# Patient Record
Sex: FEMALE | Race: WHITE | NOT HISPANIC OR LATINO | Employment: FULL TIME | URBAN - METROPOLITAN AREA
[De-identification: names, ages, dates, MRNs, and addresses within clinical notes are randomized per-mention and may not be internally consistent; named-entity substitution may affect disease eponyms.]

---

## 2018-01-15 ENCOUNTER — TRANSFERRED RECORDS (OUTPATIENT)
Dept: HEALTH INFORMATION MANAGEMENT | Facility: CLINIC | Age: 28
End: 2018-01-15

## 2018-01-15 LAB
C TRACH DNA SPEC QL PROBE+SIG AMP: NEGATIVE
SPECIMEN DESCRIPTION: NORMAL

## 2018-01-17 ENCOUNTER — PRENATAL OFFICE VISIT (OUTPATIENT)
Dept: OBGYN | Facility: CLINIC | Age: 28
End: 2018-01-17

## 2018-01-17 ENCOUNTER — APPOINTMENT (OUTPATIENT)
Dept: OBGYN | Facility: CLINIC | Age: 28
End: 2018-01-17
Payer: COMMERCIAL

## 2018-01-17 DIAGNOSIS — Z34.00 PRENATAL CARE, FIRST PREGNANCY: Primary | ICD-10-CM

## 2018-01-17 PROCEDURE — 99207 ZZC NO CHARGE NURSE ONLY: CPT | Performed by: OBSTETRICS & GYNECOLOGY

## 2018-01-17 RX ORDER — PRENATAL VIT/IRON FUM/FOLIC AC 27MG-0.8MG
1 TABLET ORAL DAILY
COMMUNITY
Start: 2017-07-14 | End: 2018-05-15

## 2018-01-17 NOTE — PROGRESS NOTES
Patient is transfer from [facility]. I asked her to please get her records faxed or sent to OB/GYN clinic before appointment  Indinaa Velazquez OB Intake Nurse

## 2018-01-17 NOTE — MR AVS SNAPSHOT
"              After Visit Summary   1/17/2018    Anu Davis    MRN: [id number]           Patient Information     Date Of Birth          1990        Visit Information        Provider Department      1/17/2018 9:21 AM Asia Borden MD [facility]        Today's Diagnoses     Prenatal care, first pregnancy    -  1       Follow-ups after your visit        Your next 10 appointments already scheduled     Jan 29, 2018  2:00 PM CST   New Prenatal with Asia Bordne MD   [facility]    [address] [address]   [phone]              Who to contact     If you have questions or need follow up information about today's clinic visit or your schedule please contact [facility] directly at [phone].  Normal or non-critical lab and imaging results will be communicated to you by MyChart, letter or phone within 4 business days after the clinic has received the results. If you do not hear from us within 7 days, please contact the clinic through MyChart or phone. If you have a critical or abnormal lab result, we will notify you by phone as soon as possible.  Submit refill requests through GPMESS or call your pharmacy and they will forward the refill request to us. Please allow 3 business days for your refill to be completed.          Additional Information About Your Visit        MyChart Information     GPMESS lets you send messages to your doctor, view your test results, renew your prescriptions, schedule appointments and more. To sign up, go to [email or web address] . Click on \"Log in\" on the left side of the screen, which will take you to the Welcome page. Then click on \"Sign up Now\" on the right side of the page.     You will be asked to enter the access code listed below, as well as some personal information. Please follow the directions to create your username and password.     Your access code " is: [email or web address]  Expires: 2018  9:34 AM     Your access code will  in 90 days. If you need help or a new code, please call your [place] clinic or [phone].        Care EveryWhere ID     This is your Care EveryWhere ID. This could be used by other organizations to access your [place] medical records  [id number]        Your Vitals Were     Last Period                   2017            Blood Pressure from Last 3 Encounters:   No data found for BP    Weight from Last 3 Encounters:   No data found for Wt              Today, you had the following     No orders found for display       Primary Care Provider Fax #    Physician No Ref-Primary [phone]       No address on file        Equal Access to Services     [facility] M[facility] : Ronald Hurd, uriah vega, zachery barriosmajorge plasencia, lucinda sharma . So [facility] [phone].    ATENCIÓN: Si habla español, tiene a mosquera disposición servicios gratuitos de asistencia lingüística. Llame al [phone].    We comply with applicable federal civil rights laws and [place] laws. We do not discriminate on the basis of race, color, national origin, age, disability, sex, sexual orientation, or gender identity.            Thank you!     Thank you for choosing [facility]  for your care. Our goal is always to provide you with excellent care. Hearing back from our patients is one way we can continue to improve our services. Please take a few minutes to complete the written survey that you may receive in the mail after your visit with us. Thank you!             Your Updated Medication List - Protect others around you: Learn how to safely use, store and throw away your medicines at www.disposemymeds.org.          This list is accurate as of: 18  9:34 AM.  Always use your most recent med list.                   Brand Name Dispense Instructions for use Diagnosis    prenatal multivitamin plus iron 27-0.8 MG  Tabs per tablet      Take 1 tablet by mouth daily

## 2018-01-24 ENCOUNTER — TRANSFERRED RECORDS (OUTPATIENT)
Dept: HEALTH INFORMATION MANAGEMENT | Facility: CLINIC | Age: 28
End: 2018-01-24

## 2018-01-29 ENCOUNTER — TELEPHONE (OUTPATIENT)
Dept: OBGYN | Facility: CLINIC | Age: 28
End: 2018-01-29

## 2018-01-29 ENCOUNTER — PRENATAL OFFICE VISIT (OUTPATIENT)
Dept: OBGYN | Facility: CLINIC | Age: 28
End: 2018-01-29
Payer: COMMERCIAL

## 2018-01-29 VITALS
HEART RATE: 107 BPM | DIASTOLIC BLOOD PRESSURE: 65 MMHG | TEMPERATURE: 98.3 F | SYSTOLIC BLOOD PRESSURE: 131 MMHG | WEIGHT: 154 LBS | BODY MASS INDEX: 32.32 KG/M2 | HEIGHT: 58 IN

## 2018-01-29 DIAGNOSIS — Z34.00 ENCOUNTER FOR SUPERVISION PREGNANCY IN PRIMIGRAVIDA, ANTEPARTUM: Primary | ICD-10-CM

## 2018-01-29 DIAGNOSIS — Z34.03 ENCOUNTER FOR PRENATAL CARE IN THIRD TRIMESTER OF FIRST PREGNANCY: Primary | ICD-10-CM

## 2018-01-29 PROCEDURE — 99207 ZZC FIRST OB VISIT: CPT | Performed by: OBSTETRICS & GYNECOLOGY

## 2018-01-29 NOTE — PROGRESS NOTES
"Anu is a 27 year old   female who presents for transfer of prenatal care from [facility]; she is now living in [place]; she had 1st trimester ultrasound which was normal and she does not know baby's gender; she had some spotting last week, no pain, no LOF; sonogram done at that time was normal and her cervix appeared \"friable\" per exam note; labs reviewed and were all normal including glucose screen  Received TDAP.    Patient Active Problem List    Diagnosis Date Noted     Prenatal care, first pregnancy 2018     Priority: Medium       All systems were reviewed and pertinent information in noted in subjective/HPI.    Past Medical History:   Diagnosis Date     Chickenpox      Tenosynovitis        Past Surgical History:   Procedure Laterality Date     WRIST SURGERY Right 2011    ganglion removal         Current Outpatient Prescriptions:      Prenatal Vit-Fe Fumarate-FA (PRENATAL MULTIVITAMIN PLUS IRON) 27-0.8 MG TABS per tablet, Take 1 tablet by mouth daily, Disp: , Rfl:     ALLERGIES:  Review of patient's allergies indicates no known allergies.    Social History     Social History     Marital status:      Spouse name: N/A     Number of children: N/A     Years of education: N/A     Social History Main Topics     Smoking status: Never Smoker     Smokeless tobacco: Never Used     Alcohol use Yes      Comment: rare-quit with pregnancy     Drug use: None     Sexual activity: Yes     Other Topics Concern     None     Social History Narrative       Family History   Problem Relation Age of Onset     Depression Mother      situational     Medical History Unknown Mother      mother adopted     Hypertension Father      Emphysema Paternal Grandmother        OBJECTIVE:  Vitals: /65 (BP Location: Left arm, Patient Position: Chair, Cuff Size: Adult Small)  Pulse 107  Temp 98.3  F (36.8  C) (Tympanic)  Ht 4' 10\" (1.473 m)  Wt 154 lb (69.9 kg)  LMP 2017  BMI 32.19 kg/m2 BMI= Body mass index is " 32.19 kg/(m^2).   Patient's last menstrual period was 06/22/2017.     GENERAL APPEARANCE: healthy, alert and no distress     ABDOMEN: gravid; FH 30 cm; nontender; WWB=234 bpm    ASSESSMENT:      ICD-10-CM    1. Encounter for prenatal care in third trimester of first pregnancy Z34.03        PLAN:  Discussed nature of group practice, delivery at [facility]; potential transfer to tertiary care if indicated; recommendation for trial of vaginal delivery, schedule of visits moving forward; gave her phone number of [facility] to arrange tour of facility  F/u 2 weeks  Asia Borden MD  [facility]      Asia Borden MD

## 2018-01-29 NOTE — TELEPHONE ENCOUNTER
[employer] Department [employer] Certification of Health Care Provider for Employee's Serious Health Condition forms filled out and placed in Dr. Borden's inbasket to be signed.  Cookie Dumont CMA

## 2018-01-29 NOTE — MR AVS SNAPSHOT
"              After Visit Summary   2018    Anu Davis    MRN: [id number]           Patient Information     Date Of Birth          1990        Visit Information        Provider Department      2018 2:00 PM Asia Borden MD [facility]        Today's Diagnoses     Encounter for prenatal care in third trimester of first pregnancy    -  1       Follow-ups after your visit        Who to contact     If you have questions or need follow up information about today's clinic visit or your schedule please contact [facility] directly at [phone].  Normal or non-critical lab and imaging results will be communicated to you by Happy Hour Palhart, letter or phone within 4 business days after the clinic has received the results. If you do not hear from us within 7 days, please contact the clinic through Happy Hour Palhart or phone. If you have a critical or abnormal lab result, we will notify you by phone as soon as possible.  Submit refill requests through Polyera or call your pharmacy and they will forward the refill request to us. Please allow 3 business days for your refill to be completed.          Additional Information About Your Visit        MyChart Information     Polyera lets you send messages to your doctor, view your test results, renew your prescriptions, schedule appointments and more. To sign up, go to [email or web address] . Click on \"Log in\" on the left side of the screen, which will take you to the Welcome page. Then click on \"Sign up Now\" on the right side of the page.     You will be asked to enter the access code listed below, as well as some personal information. Please follow the directions to create your username and password.     Your access code is: [email or web address]  Expires: 2018  9:34 AM     Your access code will  in 90 days. If you need help or a new code, please call your [facility] or [phone].        Care EveryWhere ID     This is your Care " "EveryWhere ID. This could be used by other organizations to access your [place] medical records  [id number]        Your Vitals Were     Pulse Temperature Height Last Period BMI (Body Mass Index)       107 98.3  F (36.8  C) (Tympanic) 4' 10\" (1.473 m) 06/22/2017 32.19 kg/m2        Blood Pressure from Last 3 Encounters:   01/29/18 131/65    Weight from Last 3 Encounters:   01/29/18 154 lb (69.9 kg)              Today, you had the following     No orders found for display       Primary Care Provider Fax #    Physician No Ref-Primary [phone]       No address on file        Equal Access to Services     [facility] : Ronald Hurd, uriah vega, zachery martinalmajorge plasencia, lucinda sharma . So [facility] [phone].    ATENCIÓN: Si habla español, tiene a mosquera disposición servicios gratuitos de asistencia lingüística. Llame al [phone].    We comply with applicable federal civil rights laws and [place] laws. We do not discriminate on the basis of race, color, national origin, age, disability, sex, sexual orientation, or gender identity.            Thank you!     Thank you for choosing [facility]  for your care. Our goal is always to provide you with excellent care. Hearing back from our patients is one way we can continue to improve our services. Please take a few minutes to complete the written survey that you may receive in the mail after your visit with us. Thank you!             Your Updated Medication List - Protect others around you: Learn how to safely use, store and throw away your medicines at www.disposemymeds.org.          This list is accurate as of 1/29/18  2:20 PM.  Always use your most recent med list.                   Brand Name Dispense Instructions for use Diagnosis    prenatal multivitamin plus iron 27-0.8 MG Tabs per tablet      Take 1 tablet by mouth daily          "

## 2018-01-30 ENCOUNTER — TELEPHONE (OUTPATIENT)
Dept: OBGYN | Facility: CLINIC | Age: 28
End: 2018-01-30

## 2018-01-30 NOTE — TELEPHONE ENCOUNTER
Signed, completed form faxed to [phone], copy in folder, sent to scan.     Denise Behrendt  [facility] CSS

## 2018-02-13 ENCOUNTER — PRENATAL OFFICE VISIT (OUTPATIENT)
Dept: OBGYN | Facility: CLINIC | Age: 28
End: 2018-02-13
Payer: COMMERCIAL

## 2018-02-13 VITALS
HEART RATE: 103 BPM | HEIGHT: 58 IN | WEIGHT: 154.2 LBS | DIASTOLIC BLOOD PRESSURE: 71 MMHG | SYSTOLIC BLOOD PRESSURE: 114 MMHG | TEMPERATURE: 98.4 F | BODY MASS INDEX: 32.37 KG/M2

## 2018-02-13 DIAGNOSIS — Z34.00 ENCOUNTER FOR SUPERVISION PREGNANCY IN PRIMIGRAVIDA, ANTEPARTUM: Primary | ICD-10-CM

## 2018-02-13 PROCEDURE — 99207 ZZC PRENATAL VISIT: CPT | Performed by: OBSTETRICS & GYNECOLOGY

## 2018-02-13 NOTE — MR AVS SNAPSHOT
"              After Visit Summary   2/13/2018    Anu Davis    MRN: [id number]           Patient Information     Date Of Birth          1990        Visit Information        Provider Department      2/13/2018 3:15 PM Miguelina Irwin MD [facility]        Today's Diagnoses     Encounter for supervision pregnancy in primigravida, antepartum    -  1       Follow-ups after your visit        Follow-up notes from your care team     Return in about 2 weeks (around 2/27/2018).      Your next 10 appointments already scheduled     Feb 27, 2018  3:15 PM CST   ESTABLISHED PRENATAL with Miguelina Irwin MD   [facility] ([facility])    [address] [address] [phone]              Who to contact     If you have questions or need follow up information about today's clinic visit or your schedule please contact [facility] directly at [phone].  Normal or non-critical lab and imaging results will be communicated to you by AtheroNovahart, letter or phone within 4 business days after the clinic has received the results. If you do not hear from us within 7 days, please contact the clinic through Solariat or phone. If you have a critical or abnormal lab result, we will notify you by phone as soon as possible.  Submit refill requests through WatchDox or call your pharmacy and they will forward the refill request to us. Please allow 3 business days for your refill to be completed.          Additional Information About Your Visit        AtheroNovahart Information     WatchDox lets you send messages to your doctor, view your test results, renew your prescriptions, schedule appointments and more. To sign up, go to [email or web address] . Click on \"Log in\" on the left side of the screen, which will take you to the Welcome page. Then click on \"Sign up Now\" on the right side of the page.     You will be asked to enter the access code listed below, as " "well as some personal information. Please follow the directions to create your username and password.     Your access code is: [email or web address]  Expires: 2018  9:34 AM     Your access code will  in 90 days. If you need help or a new code, please call your [place] clinic or [phone].        Care EveryWhere ID     This is your Care EveryWhere ID. This could be used by other organizations to access your [place] medical records  [id number]        Your Vitals Were     Pulse Temperature Height Last Period Breastfeeding? BMI (Body Mass Index)    103 98.4  F (36.9  C) (Tympanic) 4' 10\" (1.473 m) 2017 No 32.23 kg/m2       Blood Pressure from Last 3 Encounters:   18 114/71   18 131/65    Weight from Last 3 Encounters:   18 154 lb 3.2 oz (69.9 kg)   18 154 lb (69.9 kg)              Today, you had the following     No orders found for display       Primary Care Provider Fax #    Physician No Ref-Primary [phone]       No address on file        Equal Access to Services     [facility]: Hadii zeyad luna Soml, waaxda lufacundo, qaybta kaalmada luis angel, lucinda sharma . So [facility] [phone].    ATENCIÓN: Si habla español, tiene a mosquera disposición servicios gratuitos de asistencia lingüística. Llame al [phone].    We comply with applicable federal civil rights laws and [place] laws. We do not discriminate on the basis of race, color, national origin, age, disability, sex, sexual orientation, or gender identity.            Thank you!     Thank you for choosing [facility]  for your care. Our goal is always to provide you with excellent care. Hearing back from our patients is one way we can continue to improve our services. Please take a few minutes to complete the written survey that you may receive in the mail after your visit with us. Thank you!             Your Updated Medication List - Protect others around you: Learn how to " safely use, store and throw away your medicines at www.disposemymeds.org.          This list is accurate as of 2/13/18  4:12 PM.  Always use your most recent med list.                   Brand Name Dispense Instructions for use Diagnosis    prenatal multivitamin plus iron 27-0.8 MG Tabs per tablet      Take 1 tablet by mouth daily

## 2018-02-13 NOTE — NURSING NOTE
"Chief Complaint   Patient presents with     Prenatal Care       Initial /71 (BP Location: Left arm, Patient Position: Sitting, Cuff Size: Adult Regular)  Pulse 103  Temp 98.4  F (36.9  C) (Tympanic)  Ht 4' 10\" (1.473 m)  Wt 154 lb 3.2 oz (69.9 kg)  LMP 06/22/2017  Breastfeeding? No  BMI 32.23 kg/m2 Estimated body mass index is 32.23 kg/(m^2) as calculated from the following:    Height as of this encounter: 4' 10\" (1.473 m).    Weight as of this encounter: 154 lb 3.2 oz (69.9 kg).  Medication Reconciliation: complete   Emily Elizondo CMA      "

## 2018-02-13 NOTE — PROGRESS NOTES
Doing well.  +FM, no ctx, no VB or LOF.    27 year old  at 33w5d   - discussed s/s of labor, hsa # for BC and knows when to call  - discussed GBS and cvx chk at 36 week visit     RTC 2 weeks    Miguelina Irwin MD, MPH  [facility] OB/[facility]yn

## 2018-02-27 ENCOUNTER — PRENATAL OFFICE VISIT (OUTPATIENT)
Dept: OBGYN | Facility: CLINIC | Age: 28
End: 2018-02-27
Payer: COMMERCIAL

## 2018-02-27 VITALS
HEIGHT: 58 IN | DIASTOLIC BLOOD PRESSURE: 70 MMHG | RESPIRATION RATE: 16 BRPM | WEIGHT: 157.4 LBS | SYSTOLIC BLOOD PRESSURE: 102 MMHG | BODY MASS INDEX: 33.04 KG/M2 | TEMPERATURE: 98.5 F | HEART RATE: 97 BPM

## 2018-02-27 DIAGNOSIS — Z34.00 ENCOUNTER FOR SUPERVISION PREGNANCY IN PRIMIGRAVIDA, ANTEPARTUM: Primary | ICD-10-CM

## 2018-02-27 PROCEDURE — 99207 ZZC PRENATAL VISIT: CPT | Performed by: OBSTETRICS & GYNECOLOGY

## 2018-02-27 PROCEDURE — 87653 STREP B DNA AMP PROBE: CPT | Performed by: OBSTETRICS & GYNECOLOGY

## 2018-02-27 NOTE — NURSING NOTE
"Chief Complaint   Patient presents with     Prenatal Care       Initial /70 (BP Location: Right arm, Patient Position: Sitting, Cuff Size: Adult Regular)  Pulse 97  Temp 98.5  F (36.9  C) (Tympanic)  Resp 16  Ht 4' 10\" (1.473 m)  Wt 157 lb 6.4 oz (71.4 kg)  LMP 06/22/2017  Breastfeeding? No  BMI 32.9 kg/m2 Estimated body mass index is 32.9 kg/(m^2) as calculated from the following:    Height as of this encounter: 4' 10\" (1.473 m).    Weight as of this encounter: 157 lb 6.4 oz (71.4 kg).  Medication Reconciliation: complete   Emily Elizondo, ALLI      "

## 2018-02-27 NOTE — MR AVS SNAPSHOT
After Visit Summary   2/27/2018    Anu Davis    MRN: [id number]           Patient Information     Date Of Birth          1990        Visit Information        Provider Department      2/27/2018 3:15 PM Miguelina Irwin MD [facility]        Today's Diagnoses     Encounter for supervision pregnancy in primigravida, antepartum    -  1       Follow-ups after your visit        Follow-up notes from your care team     Return in about 1 week (around 3/6/2018).      Your next 10 appointments already scheduled     Mar 09, 2018  8:45 AM CST   ESTABLISHED PRENATAL with Miguelina Irwin MD   [facility] ([facility]    [address] [address]   [phone]            Mar 15, 2018  3:30 PM CDT   ESTABLISHED PRENATAL with Elver Jean MD   [facility] ([facility])    [address] [address]   [phone]            Mar 23, 2018  3:30 PM CDT   ESTABLISHED PRENATAL with Wang Pelletier MD   [facility] ([facility])    [address] [address]   [phone]            Mar 30, 2018  4:00 PM CDT   ESTABLISHED PRENATAL with Josi Garcia MD   [facility] ([facility])    [address] [address]   [phone]              Who to contact     If you have questions or need follow up information about today's clinic visit or your schedule please contact [facility] directly at [phone].  Normal or non-critical lab and imaging results will be communicated to you by MyChart, letter or phone within 4 business days after the clinic has received the results. If you do not hear from us within 7 days, please contact the clinic through MyChart or phone. If you have a critical or abnormal lab result, we will notify you by phone as soon as  "possible.  Submit refill requests through Scrap Connection or call your pharmacy and they will forward the refill request to us. Please allow 3 business days for your refill to be completed.          Additional Information About Your Visit        BrandkidsharFanear Information     Scrap Connection lets you send messages to your doctor, view your test results, renew your prescriptions, schedule appointments and more. To sign up, go to [email or web address] . Click on \"Log in\" on the left side of the screen, which will take you to the Welcome page. Then click on \"Sign up Now\" on the right side of the page.     You will be asked to enter the access code listed below, as well as some personal information. Please follow the directions to create your username and password.     Your access code is: [email or web address]  Expires: 2018  9:34 AM     Your access code will  in 90 days. If you need help or a new code, please call your [place] clinic or [phone].        Care EveryWhere ID     This is your Care EveryWhere ID. This could be used by other organizations to access your [place] medical records  [id number]        Your Vitals Were     Pulse Temperature Respirations Height Last Period Breastfeeding?    97 98.5  F (36.9  C) (Tympanic) 16 4' 10\" (1.473 m) 2017 No    BMI (Body Mass Index)                   32.9 kg/m2            Blood Pressure from Last 3 Encounters:   18 102/70   18 114/71   18 131/65    Weight from Last 3 Encounters:   18 157 lb 6.4 oz (71.4 kg)   18 154 lb 3.2 oz (69.9 kg)   18 154 lb (69.9 kg)              We Performed the Following     Group B strep PCR        Primary Care Provider Fax #    Physician No Ref-Primary [phone]       No address on file        Equal Access to Services     [facility] M[facility] : Ronald Hurd, uriah vega, lucinda rodriguez. [facility] [phone].    ATENCIÓN: Si habla español, tiene a " mosquera disposición servicios gratuitos de asistencia lingüística. Hiro mendoza [phone].    We comply with applicable federal civil rights laws and [place] laws. We do not discriminate on the basis of race, color, national origin, age, disability, sex, sexual orientation, or gender identity.            Thank you!     Thank you for choosing [facility]  for your care. Our goal is always to provide you with excellent care. Hearing back from our patients is one way we can continue to improve our services. Please take a few minutes to complete the written survey that you may receive in the mail after your visit with us. Thank you!             Your Updated Medication List - Protect others around you: Learn how to safely use, store and throw away your medicines at www.disposemymeds.org.          This list is accurate as of 2/27/18  4:22 PM.  Always use your most recent med list.                   Brand Name Dispense Instructions for use Diagnosis    prenatal multivitamin plus iron 27-0.8 MG Tabs per tablet      Take 1 tablet by mouth daily

## 2018-02-27 NOTE — PROGRESS NOTES
Hanging in there.  +FM, no ctx, no VB or LOF.    27 year old  at 35w5d   - GBS and cvx chk today  - has # for BC  - discussed IOL after 41 weeks for post-dates or by maternal request with a favorable cervix after 39 weeks.   Discussed with Anu Davis, the following; indications; the agents and methods of labor augmentation, including risks, benefits, and alternative approaches; and the possible need for  birth. EFW is AGA.  The Labor Induction:what you need to know information sheet was made available to her. Questions and concerns were addressed and patient agrees to above if necessary during the course of her labor.     RTC weekly until delivery    Miguelina Irwin MD, MPH  [facility]

## 2018-02-28 LAB
GP B STREP DNA SPEC QL NAA+PROBE: NEGATIVE
SPECIMEN SOURCE: NORMAL

## 2018-03-09 ENCOUNTER — PRENATAL OFFICE VISIT (OUTPATIENT)
Dept: OBGYN | Facility: CLINIC | Age: 28
End: 2018-03-09
Payer: COMMERCIAL

## 2018-03-09 VITALS
HEART RATE: 106 BPM | HEIGHT: 58 IN | WEIGHT: 159 LBS | TEMPERATURE: 98.4 F | BODY MASS INDEX: 33.37 KG/M2 | DIASTOLIC BLOOD PRESSURE: 67 MMHG | SYSTOLIC BLOOD PRESSURE: 125 MMHG | RESPIRATION RATE: 16 BRPM

## 2018-03-09 DIAGNOSIS — Z34.00 ENCOUNTER FOR SUPERVISION PREGNANCY IN PRIMIGRAVIDA, ANTEPARTUM: Primary | ICD-10-CM

## 2018-03-09 PROCEDURE — 99207 ZZC PRENATAL VISIT: CPT | Performed by: OBSTETRICS & GYNECOLOGY

## 2018-03-09 NOTE — MR AVS SNAPSHOT
After Visit Summary   3/9/2018    Anu Davis    MRN: [id number]           Patient Information     Date Of Birth          1990        Visit Information        Provider Department      3/9/2018 8:45 AM Miguelina Irwin MD [facility]        Today's Diagnoses     Encounter for supervision pregnancy in primigravida, antepartum    -  1       Follow-ups after your visit        Follow-up notes from your care team     Return in about 1 week (around 3/16/2018).      Your next 10 appointments already scheduled     Mar 15, 2018  3:30 PM CDT   ESTABLISHED PRENATAL with Elver Jean MD   [facility] ([facility]    [address] [address]   [phone]            Mar 23, 2018  3:30 PM CDT   ESTABLISHED PRENATAL with Wang Pelletier MD   [facility] ([facility]    [address] [place] [address]   [phone]            Mar 30, 2018  4:00 PM CDT   ESTABLISHED PRENATAL with Josi Garcia MD   [facility] ([facility]    [address] [address]   [phone]              Who to contact     If you have questions or need follow up information about today's clinic visit or your schedule please contact [facility] directly at [phone].  Normal or non-critical lab and imaging results will be communicated to you by MyChart, letter or phone within 4 business days after the clinic has received the results. If you do not hear from us within 7 days, please contact the clinic through MyChart or phone. If you have a critical or abnormal lab result, we will notify you by phone as soon as possible.  Submit refill requests through [employer] or call your pharmacy and they will forward the refill request to us. Please allow 3 business days for your refill to be completed.          Additional Information About Your  "Visit        Nu[employer] Information     [employer] lets you send messages to your doctor, view your test results, renew your prescriptions, schedule appointments and more. To sign up, go to [email or web address] . Click on \"Log in\" on the left side of the screen, which will take you to the Welcome page. Then click on \"Sign up Now\" on the right side of the page.     You will be asked to enter the access code listed below, as well as some personal information. Please follow the directions to create your username and password.     Your access code is: [email or web address]  Expires: 2018  9:34 AM     Your access code will  in 90 days. If you need help or a new code, please call your [place] clinic or [phone].        Care EveryWhere ID     This is your Care EveryWhere ID. This could be used by other organizations to access your [place] medical records  [id number]        Your Vitals Were     Pulse Temperature Respirations Height Last Period Breastfeeding?    106 98.4  F (36.9  C) (Tympanic) 16 4' 10\" (1.473 m) 2017 No    BMI (Body Mass Index)                   33.23 kg/m2            Blood Pressure from Last 3 Encounters:   18 125/67   18 102/70   18 114/71    Weight from Last 3 Encounters:   18 159 lb (72.1 kg)   18 157 lb 6.4 oz (71.4 kg)   18 154 lb 3.2 oz (69.9 kg)              Today, you had the following     No orders found for display       Primary Care Provider Fax #    Physician No Ref-Primary [phone]       No address on file        Equal Access to Services     SOLEDAD MUNSON : Hadii zeyad Hurd, uriah vega, qaybta kaalmalucinda sneed . So [facility] [phone].    ATENCIÓN: Si habla español, tiene a mosquera disposición servicios gratuitos de asistencia lingüística. Llame al [phone].    We comply with applicable federal civil rights laws and [place] laws. We do not discriminate on the basis of race, color, " national origin, age, disability, sex, sexual orientation, or gender identity.            Thank you!     Thank you for choosing [facility]  for your care. Our goal is always to provide you with excellent care. Hearing back from our patients is one way we can continue to improve our services. Please take a few minutes to complete the written survey that you may receive in the mail after your visit with us. Thank you!             Your Updated Medication List - Protect others around you: Learn how to safely use, store and throw away your medicines at www.disposemymeds.org.          This list is accurate as of 3/9/18  9:00 AM.  Always use your most recent med list.                   Brand Name Dispense Instructions for use Diagnosis    prenatal multivitamin plus iron 27-0.8 MG Tabs per tablet      Take 1 tablet by mouth daily

## 2018-03-09 NOTE — NURSING NOTE
"Chief Complaint   Patient presents with     Prenatal Care       Initial /67 (BP Location: Right arm, Patient Position: Sitting, Cuff Size: Adult Regular)  Pulse 106  Temp 98.4  F (36.9  C) (Tympanic)  Resp 16  Ht 4' 10\" (1.473 m)  Wt 159 lb (72.1 kg)  LMP 06/22/2017  Breastfeeding? No  BMI 33.23 kg/m2 Estimated body mass index is 33.23 kg/(m^2) as calculated from the following:    Height as of this encounter: 4' 10\" (1.473 m).    Weight as of this encounter: 159 lb (72.1 kg).  Medication Reconciliation: complete   Emily Elizondo, ALLI      "

## 2018-03-15 ENCOUNTER — PRENATAL OFFICE VISIT (OUTPATIENT)
Dept: OBGYN | Facility: CLINIC | Age: 28
End: 2018-03-15
Payer: COMMERCIAL

## 2018-03-15 VITALS
WEIGHT: 158 LBS | BODY MASS INDEX: 33.17 KG/M2 | DIASTOLIC BLOOD PRESSURE: 76 MMHG | RESPIRATION RATE: 17 BRPM | TEMPERATURE: 97.6 F | HEART RATE: 91 BPM | HEIGHT: 58 IN | SYSTOLIC BLOOD PRESSURE: 119 MMHG

## 2018-03-15 DIAGNOSIS — Z34.03 ENCOUNTER FOR PRENATAL CARE IN THIRD TRIMESTER OF FIRST PREGNANCY: Primary | ICD-10-CM

## 2018-03-15 PROCEDURE — 99207 ZZC PRENATAL VISIT: CPT | Performed by: OBSTETRICS & GYNECOLOGY

## 2018-03-15 NOTE — NURSING NOTE
"Chief Complaint   Patient presents with     Prenatal Care       Initial /76 (BP Location: Right arm, Patient Position: Chair, Cuff Size: Adult Regular)  Pulse 91  Temp 97.6  F (36.4  C)  Resp 17  Ht 4' 10\" (1.473 m)  Wt 158 lb (71.7 kg)  LMP 06/22/2017  Breastfeeding? No  BMI 33.02 kg/m2 Estimated body mass index is 33.02 kg/(m^2) as calculated from the following:    Height as of this encounter: 4' 10\" (1.473 m).    Weight as of this encounter: 158 lb (71.7 kg).  Medication Reconciliation: complete   Cookie Dumont CMA      "

## 2018-03-15 NOTE — MR AVS SNAPSHOT
After Visit Summary   3/15/2018    Anu Davis    MRN: [id number]           Patient Information     Date Of Birth          1990        Visit Information        Provider Department      3/15/2018 3:30 PM Elver Jena MD [facility]        Today's Diagnoses     Encounter for prenatal care in third trimester of first pregnancy    -  1       Follow-ups after your visit        Follow-up notes from your care team     Return in about 1 week (around 3/22/2018).      Your next 10 appointments already scheduled     Mar 23, 2018  3:30 PM CDT   ESTABLISHED PRENATAL with Wang Pelletier MD   [facility]    [address] [address]   [phone]            Mar 30, 2018  4:00 PM CDT   ESTABLISHED PRENATAL with Josi Garcia MD   [facility] ([facility])    [address] [address]   [phone]              Who to contact     If you have questions or need follow up information about today's clinic visit or your schedule please contact [facility] directly at [phone].  Normal or non-critical lab and imaging results will be communicated to you by MyChart, letter or phone within 4 business days after the clinic has received the results. If you do not hear from us within 7 days, please contact the clinic through MyChart or phone. If you have a critical or abnormal lab result, we will notify you by phone as soon as possible.  Submit refill requests through [employer] or call your pharmacy and they will forward the refill request to us. Please allow 3 business days for your refill to be completed.          Additional Information About Your Visit        codebenderhart Information     [employer] lets you send messages to your doctor, view your test results, renew your prescriptions, schedule appointments and more. To sign up, go to  "[email or web address] . Click on \"Log in\" on the left side of the screen, which will take you to the Welcome page. Then click on \"Sign up Now\" on the right side of the page.     You will be asked to enter the access code listed below, as well as some personal information. Please follow the directions to create your username and password.     Your access code is: [email or web address]  Expires: 2018 10:34 AM     Your access code will  in 90 days. If you need help or a new code, please call your [place] clinic or [phone].        Care EveryWhere ID     This is your Care EveryWhere ID. This could be used by other organizations to access your [place] medical records  [id number]        Your Vitals Were     Pulse Temperature Respirations Height Last Period Breastfeeding?    91 97.6  F (36.4  C) 17 4' 10\" (1.473 m) 2017 No    BMI (Body Mass Index)                   33.02 kg/m2            Blood Pressure from Last 3 Encounters:   03/15/18 119/76   18 125/67   18 102/70    Weight from Last 3 Encounters:   03/15/18 158 lb (71.7 kg)   18 159 lb (72.1 kg)   18 157 lb 6.4 oz (71.4 kg)              Today, you had the following     No orders found for display       Primary Care Provider Fax #    Physician No Ref-Primary [phone]       No address on file        Equal Access to Services     VARGHESE MUNSON : Hadii zeyad rolle hadasho Soomaali, waaxda luqadaha, qaybta kaalmada adeegyada, lucinda sharma . So [facility] [phone].    ATENCIÓN: Si habla español, tiene a mosquera disposición servicios gratuitos de asistencia lingüística. Llame al [phone].    We comply with applicable federal civil rights laws and [place] laws. We do not discriminate on the basis of race, color, national origin, age, disability, sex, sexual orientation, or gender identity.            Thank you!     Thank you for choosing [facility]  for your care. Our goal is always to provide you " with excellent care. Hearing back from our patients is one way we can continue to improve our services. Please take a few minutes to complete the written survey that you may receive in the mail after your visit with us. Thank you!             Your Updated Medication List - Protect others around you: Learn how to safely use, store and throw away your medicines at www.disposemymeds.org.          This list is accurate as of 3/15/18  4:54 PM.  Always use your most recent med list.                   Brand Name Dispense Instructions for use Diagnosis    prenatal multivitamin plus iron 27-0.8 MG Tabs per tablet      Take 1 tablet by mouth daily

## 2018-03-15 NOTE — PROGRESS NOTES
"CC: Prenatal visit    S: feeling well   No LOF   NO VB    O: /76 (BP Location: Right arm, Patient Position: Chair, Cuff Size: Adult Regular)  Pulse 91  Temp 97.6  F (36.4  C)  Resp 17  Ht 4' 10\" (1.473 m)  Wt 158 lb (71.7 kg)  LMP 06/22/2017  Breastfeeding? No  BMI 33.02 kg/m2  See above table    A: IUP @ 38 week EGA  GBS negative    P RTC 1 week  Labor precautions reviewed  Elver Jean      "

## 2018-03-23 ENCOUNTER — PRENATAL OFFICE VISIT (OUTPATIENT)
Dept: OBGYN | Facility: CLINIC | Age: 28
End: 2018-03-23
Payer: COMMERCIAL

## 2018-03-23 VITALS
HEART RATE: 81 BPM | RESPIRATION RATE: 16 BRPM | HEIGHT: 58 IN | WEIGHT: 158.2 LBS | SYSTOLIC BLOOD PRESSURE: 112 MMHG | BODY MASS INDEX: 33.21 KG/M2 | DIASTOLIC BLOOD PRESSURE: 74 MMHG | TEMPERATURE: 98.3 F

## 2018-03-23 DIAGNOSIS — Z34.03 ENCOUNTER FOR PRENATAL CARE IN THIRD TRIMESTER OF FIRST PREGNANCY: Primary | ICD-10-CM

## 2018-03-23 PROCEDURE — 99207 ZZC PRENATAL VISIT: CPT | Performed by: OBSTETRICS & GYNECOLOGY

## 2018-03-23 NOTE — MR AVS SNAPSHOT
"              After Visit Summary   3/23/2018    Anu Davis    MRN: [id number]           Patient Information     Date Of Birth          1990        Visit Information        Provider Department      3/23/2018 3:30 PM Wang Pelletier MD [facility]        Today's Diagnoses     Encounter for prenatal care in third trimester of first pregnancy    -  1       Follow-ups after your visit        Your next 10 appointments already scheduled     Mar 30, 2018  4:00 PM CDT   ESTABLISHED PRENATAL with Josi Garcia MD   [facility]    [address] [address]   [phone]              Who to contact     If you have questions or need follow up information about today's clinic visit or your schedule please contact [facility] directly at [phone].  Normal or non-critical lab and imaging results will be communicated to you by MyChart, letter or phone within 4 business days after the clinic has received the results. If you do not hear from us within 7 days, please contact the clinic through MyChart or phone. If you have a critical or abnormal lab result, we will notify you by phone as soon as possible.  Submit refill requests through [employer] or call your pharmacy and they will forward the refill request to us. Please allow 3 business days for your refill to be completed.          Additional Information About Your Visit        MyChart Information     [employer] Magic lets you send messages to your doctor, view your test results, renew your prescriptions, schedule appointments and more. To sign up, go to [email or web address] . Click on \"Log in\" on the left side of the screen, which will take you to the Welcome page. Then click on \"Sign up Now\" on the right side of the page.     You will be asked to enter the access code listed below, as well as some personal information. Please follow the directions to create " "your username and password.     Your access code is: [email or web address]  Expires: 2018 10:34 AM     Your access code will  in 90 days. If you need help or a new code, please call your [facility] or [phone].        Care EveryWhere ID     This is your Care EveryWhere ID. This could be used by other organizations to access your [place] medical records  [id number]        Your Vitals Were     Pulse Temperature Respirations Height Last Period Breastfeeding?    81 98.3  F (36.8  C) (Tympanic) 16 4' 10\" (1.473 m) 2017 No    BMI (Body Mass Index)                   33.06 kg/m2            Blood Pressure from Last 3 Encounters:   18 112/74   03/15/18 119/76   18 125/67    Weight from Last 3 Encounters:   18 158 lb 3.2 oz (71.8 kg)   03/15/18 158 lb (71.7 kg)   18 159 lb (72.1 kg)              Today, you had the following     No orders found for display       Primary Care Provider Fax #    Physician No Ref-Primary [phone]       No address on file        Equal Access to Services     SOLEDAD MUNSON : Hadii zeyad Hurd, waaxda lujuanadaha, qaybta kaalmada adebrandoyada, lucinda sharma . So [facility] [phone].    ATENCIÓN: Si habla español, tiene a mosquera disposición servicios gratuitos de asistencia lingüística. Hiro al [phone].    We comply with applicable federal civil rights laws and [place] laws. We do not discriminate on the basis of race, color, national origin, age, disability, sex, sexual orientation, or gender identity.            Thank you!     Thank you for choosing [facility]  for your care. Our goal is always to provide you with excellent care. Hearing back from our patients is one way we can continue to improve our services. Please take a few minutes to complete the written survey that you may receive in the mail after your visit with us. Thank you!             Your Updated Medication List - Protect others around you: " Learn how to safely use, store and throw away your medicines at www.disposemymeds.org.          This list is accurate as of 3/23/18  3:46 PM.  Always use your most recent med list.                   Brand Name Dispense Instructions for use Diagnosis    prenatal multivitamin plus iron 27-0.8 MG Tabs per tablet      Take 1 tablet by mouth daily

## 2018-03-23 NOTE — NURSING NOTE
"Chief Complaint   Patient presents with     Prenatal Care       Initial /74 (BP Location: Right arm, Patient Position: Chair, Cuff Size: Adult Regular)  Pulse 81  Temp 98.3  F (36.8  C) (Tympanic)  Resp 16  Ht 4' 10\" (1.473 m)  Wt 158 lb 3.2 oz (71.8 kg)  LMP 06/22/2017  Breastfeeding? No  BMI 33.06 kg/m2 Estimated body mass index is 33.06 kg/(m^2) as calculated from the following:    Height as of this encounter: 4' 10\" (1.473 m).    Weight as of this encounter: 158 lb 3.2 oz (71.8 kg).  Medication Reconciliation: complete   Cookie Dumont, ALLI      "

## 2018-03-23 NOTE — PROGRESS NOTES
"Doing well.   Reports trina wade ctx.   Denies VB, LOF. +FM  /74 (BP Location: Right arm, Patient Position: Chair, Cuff Size: Adult Regular)  Pulse 81  Temp 98.3  F (36.8  C) (Tympanic)  Resp 16  Ht 4' 10\" (1.473 m)  Wt 158 lb 3.2 oz (71.8 kg)  LMP 2017  Breastfeeding? No  BMI 33.06 kg/m2  General Appearance: NAD  Abdomen: Gravid, NT  Refer to flow sheet above.   A/P: 27 year old  at 39w1d  -- labor precautions reviewed  RTC in 1 week    Wang Pelletier MD  [facility]            "

## 2018-03-29 ENCOUNTER — HOSPITAL ENCOUNTER (OUTPATIENT)
Facility: CLINIC | Age: 28
Discharge: HOME | End: 2018-03-29
Attending: OBSTETRICS & GYNECOLOGY | Admitting: OBSTETRICS & GYNECOLOGY
Payer: COMMERCIAL

## 2018-03-29 ENCOUNTER — TELEPHONE (OUTPATIENT)
Dept: OBGYN | Facility: CLINIC | Age: 28
End: 2018-03-29

## 2018-03-29 VITALS
DIASTOLIC BLOOD PRESSURE: 68 MMHG | HEART RATE: 86 BPM | TEMPERATURE: 97.7 F | SYSTOLIC BLOOD PRESSURE: 121 MMHG | RESPIRATION RATE: 18 BRPM

## 2018-03-29 PROBLEM — Z34.00 SUPERVISION OF NORMAL FIRST PREGNANCY: Status: ACTIVE | Noted: 2018-03-29

## 2018-03-29 LAB
A1 MICROGLOB PLACENTAL VAG QL: NEGATIVE
RUPTURE OF FETAL MEMBRANES BY ROM PLUS: NEGATIVE

## 2018-03-29 PROCEDURE — G0463 HOSPITAL OUTPT CLINIC VISIT: HCPCS

## 2018-03-29 PROCEDURE — 84112 EVAL AMNIOTIC FLUID PROTEIN: CPT | Performed by: OBSTETRICS & GYNECOLOGY

## 2018-03-29 PROCEDURE — 59025 FETAL NON-STRESS TEST: CPT | Mod: 26 | Performed by: OBSTETRICS & GYNECOLOGY

## 2018-03-29 PROCEDURE — 59025 FETAL NON-STRESS TEST: CPT

## 2018-03-29 PROCEDURE — 84112 EVAL AMNIOTIC FLUID PROTEIN: CPT | Mod: 91 | Performed by: OBSTETRICS & GYNECOLOGY

## 2018-03-29 RX ORDER — ONDANSETRON 2 MG/ML
4 INJECTION INTRAMUSCULAR; INTRAVENOUS EVERY 6 HOURS PRN
Status: DISCONTINUED | OUTPATIENT
Start: 2018-03-29 | End: 2018-03-29 | Stop reason: HOSPADM

## 2018-03-29 NOTE — DISCHARGE INSTRUCTIONS
Discharge Instructions for Undelivered Patients    Diet:    Drink 8 to 12 glasses of liquids (milk, juice, water) every day.    You may eat meals and snacks..    Activity      Count fetal kicks every day. (See handout.)    Call your doctor if your baby is moving less than usual.    Medicines:    My care team has reviewed my medicines with me.    My care team has given me a list of my medicines.    My care team has prescribed a new medicine. They have either sent it home with me or ordered it from the pharmacy.    Call your provider if you notice:    Swelling in your face or increased swelling in your hands or legs.    Headaches that are not relieved by Tylenol (acetaminophen).    Changes in your vision (blurring; seeing spots or stars).    Nausea (sick to your stomach) and vomiting (throwing up).    Weight gain of 5 pounds per week.    Heartburn that doesn't go away.    Signs of bladder infection: pain when you urinate (use the toilet), needing to go more often or more urgently.    The bag of todd (membranes) breaks, or you notice leaking in your underwear.    Bright red blood in your underwear.    Abdominal (lower belly) or stomach pain.    For first baby: Contractions (tightenings) less than 5 minutes apart for one hour or more.    For Second (plus) baby: Contractions (tightenings) less than 10 minutes apart and getting stronger.    Increase or change in vaginal discharge (note the color and amount).      Follow up with your provider: at next sched appointment      Call Birth center with questions or concerns.   [phone]

## 2018-03-29 NOTE — IP AVS SNAPSHOT
MRN:[id number]                      After Visit Summary   3/29/2018    Anu Davis    MRN: [id number]           Thank you!     Thank you for choosing [place] [facility] for your care. Our goal is always to provide you with excellent care. Hearing back from our patients is one way we can continue to improve our services. Please take a few minutes to complete the written survey that you may receive in the mail after you visit with us. Thank you!        Patient Information     Date Of Birth          1990        Designated Caregiver       Most Recent Value    Caregiver    Will someone help with your care after discharge? yes    Name of designated caregiver Dimas Harman      About your hospital stay     You were admitted on:  March 29, 2018 You last received care in the:  [facility]    You were discharged on:  March 29, 2018       Who to Call     For medical emergencies, please call 911.  For non-urgent questions about your medical care, please call your primary care provider or clinic, None          Attending Provider     Provider Specialty    Ifrah Hernandez MD OB/Gyn       Primary Care Provider Fax #    Physician No Ref-Primary [phone]      Your next 10 appointments already scheduled     Mar 30, 2018  4:00 PM CDT   ESTABLISHED PRENATAL [facility]i [facility]    [address] [id number] [phone]              Further instructions from your care team       Discharge Instructions for Undelivered Patients    Diet:    Drink 8 to 12 glasses of liquids (milk, juice, water) every day.    You may eat meals and snacks..    Activity      Count fetal kicks every day. (See handout.)    Call your doctor if your baby is moving less than usual.    Medicines:    My care team has reviewed my medicines with me.    My care team has given me a list of my medicines.    My care team has prescribed a new  "medicine. They have either sent it home with me or ordered it from the pharmacy.    Call your provider if you notice:    Swelling in your face or increased swelling in your hands or legs.    Headaches that are not relieved by Tylenol (acetaminophen).    Changes in your vision (blurring; seeing spots or stars).    Nausea (sick to your stomach) and vomiting (throwing up).    Weight gain of 5 pounds per week.    Heartburn that doesn't go away.    Signs of bladder infection: pain when you urinate (use the toilet), needing to go more often or more urgently.    The bag of todd (membranes) breaks, or you notice leaking in your underwear.    Bright red blood in your underwear.    Abdominal (lower belly) or stomach pain.    For first baby: Contractions (tightenings) less than 5 minutes apart for one hour or more.    For Second (plus) baby: Contractions (tightenings) less than 10 minutes apart and getting stronger.    Increase or change in vaginal discharge (note the color and amount).      Follow up with your provider: at next sched appointment      Call Birth center with questions or concerns.   [phone]    Pending Results     No orders found from 3/27/2018 to 3/30/2018.            Admission Information     Date & Time Provider Department Dept. Phone    3/29/2018 Ifrah Hernandez MD [facility] [phone]      Your Vitals Were     Blood Pressure Pulse Temperature Respirations Last Period       121/68 86 97.7  F (36.5  C) (Oral) 18 06/22/2017       MyChart Information     [employer] lets you send messages to your doctor, view your test results, renew your prescriptions, schedule appointments and more. To sign up, go to [email or web address] . Click on \"Log in\" on the left side of the screen, which will take you to the Welcome page. Then click on \"Sign up Now\" on the right side of the page.     You will be asked to enter the access code listed below, as well as some personal information. Please follow " the directions to create your username and password.     Your access code is: [email or web address]  Expires: 2018 10:34 AM     Your access code will  in 90 days. If you need help or a new code, please call your [place] clinic or [phone].        Care EveryWhere ID     This is your Care EveryWhere ID. This could be used by other organizations to access your [place] medical records  [id number]        Equal Access to Services     [facility] : Hadii zeyad rolle hadasho Soomaali, waaxda luqadaha, qaybta kaalmada adeegyada, waxay tonyin hayjolene sharma . So [facility] [phone].    ATENCIÓN: Si habla español, tiene a mosquera disposición servicios gratuitos de asistencia lingüística. Hiro al [phone].    We comply with applicable federal civil rights laws and [place] laws. We do not discriminate on the basis of race, color, national origin, age, disability, sex, sexual orientation, or gender identity.               Review of your medicines      UNREVIEWED medicines. Ask your doctor about these medicines        Dose / Directions    prenatal multivitamin plus iron 27-0.8 MG Tabs per tablet        Dose:  1 tablet   Take 1 tablet by mouth daily   Refills:  0                Protect others around you: Learn how to safely use, store and throw away your medicines at www.disposemymeds.org.             Medication List: This is a list of all your medications and when to take them. Check marks below indicate your daily home schedule. Keep this list as a reference.      Medications           Morning Afternoon Evening Bedtime As Needed    prenatal multivitamin plus iron 27-0.8 MG Tabs per tablet   Take 1 tablet by mouth daily

## 2018-03-29 NOTE — PROGRESS NOTES
Category 1 monitor strip. Amnisure negative x 2. Dr. Hernandez notified,may discharge to home. Discharge instructions reviewed with pt. states understanding.  Instructed to keep office appointment.

## 2018-03-29 NOTE — PLAN OF CARE
S:Patient presents due to  labor evaluation and fluid leaking .  B:40w0d   Allergies: Review of patient's allergies indicates no known allergies.  A:Vital Signs  Temp: 97.7  F (36.5  C)  Temp src: Oral  Resp: 18  Pulse: 86  Cervical Exam  Dilation: 1.5  Effacement (%): 80  Station: -1  Fetal Presentation: Cephalic     FHTs are category 1.        Admission on 2018   Component Date Value     Amnisure 2018 Negative      Dr. GUERO Hernandez in department and orders received.  Plan includes; Monitor, observe and reevaluate. Reviewed with patient and she agrees with plan.   Bill of Rights given & questions discussed?: Yes  HC Your Rights handout : Informed and given    Prenatal Breastfeeding Education Toolkit provided for patient to review,helping her to make an informed decision on a feeding choice for her baby. Patient accepted. Questions answered.    Oriented to room and call light.

## 2018-03-29 NOTE — IP AVS SNAPSHOT
[facility]    [address] [address]    Phone:  [phone]    Fax:  [phone]                                       After Visit Summary   3/29/2018    Anu Davis    MRN: [id number]           After Visit Summary Signature Page     I have received my discharge instructions, and my questions have been answered. I have discussed any challenges I see with this plan with the nurse or doctor.    ..........................................................................................................................................  Patient/Patient Representative Signature      ..........................................................................................................................................  Patient Representative Print Name and Relationship to Patient    ..................................................               ................................................  Date                                            Time    ..........................................................................................................................................  Reviewed by Signature/Title    ...................................................              ..............................................  Date                                                            Time

## 2018-03-30 ENCOUNTER — PRENATAL OFFICE VISIT (OUTPATIENT)
Dept: OBGYN | Facility: CLINIC | Age: 28
End: 2018-03-30
Payer: COMMERCIAL

## 2018-03-30 ENCOUNTER — OFFICE VISIT (OUTPATIENT)
Dept: FAMILY MEDICINE | Facility: CLINIC | Age: 28
End: 2018-03-30
Payer: COMMERCIAL

## 2018-03-30 VITALS
RESPIRATION RATE: 18 BRPM | HEIGHT: 58 IN | TEMPERATURE: 98.3 F | DIASTOLIC BLOOD PRESSURE: 59 MMHG | SYSTOLIC BLOOD PRESSURE: 119 MMHG | WEIGHT: 160.4 LBS | BODY MASS INDEX: 33.67 KG/M2 | HEART RATE: 89 BPM

## 2018-03-30 VITALS
SYSTOLIC BLOOD PRESSURE: 114 MMHG | DIASTOLIC BLOOD PRESSURE: 65 MMHG | TEMPERATURE: 98.7 F | HEIGHT: 58 IN | HEART RATE: 90 BPM | BODY MASS INDEX: 33.75 KG/M2 | WEIGHT: 160.8 LBS

## 2018-03-30 DIAGNOSIS — Z34.03 ENCOUNTER FOR PRENATAL CARE IN THIRD TRIMESTER OF FIRST PREGNANCY: Primary | ICD-10-CM

## 2018-03-30 DIAGNOSIS — R30.0 DYSURIA: Primary | ICD-10-CM

## 2018-03-30 LAB
ALBUMIN UR-MCNC: NEGATIVE MG/DL
APPEARANCE UR: CLEAR
BILIRUB UR QL STRIP: NEGATIVE
COLOR UR AUTO: YELLOW
GLUCOSE UR STRIP-MCNC: NEGATIVE MG/DL
HGB UR QL STRIP: NEGATIVE
KETONES UR STRIP-MCNC: NEGATIVE MG/DL
LEUKOCYTE ESTERASE UR QL STRIP: NEGATIVE
NITRATE UR QL: NEGATIVE
PH UR STRIP: 6.5 PH (ref 5–7)
SOURCE: NORMAL
SP GR UR STRIP: 1.02 (ref 1–1.03)
UROBILINOGEN UR STRIP-ACNC: 0.2 EU/DL (ref 0.2–1)

## 2018-03-30 PROCEDURE — 99207 ZZC PRENATAL VISIT: CPT | Performed by: OBSTETRICS & GYNECOLOGY

## 2018-03-30 PROCEDURE — 99213 OFFICE O/P EST LOW 20 MIN: CPT | Performed by: FAMILY MEDICINE

## 2018-03-30 PROCEDURE — 81003 URINALYSIS AUTO W/O SCOPE: CPT | Performed by: FAMILY MEDICINE

## 2018-03-30 PROCEDURE — 76815 OB US LIMITED FETUS(S): CPT | Performed by: OBSTETRICS & GYNECOLOGY

## 2018-03-30 NOTE — NURSING NOTE
"Chief Complaint   Patient presents with     Prenatal Care       Initial /59 (BP Location: Right arm, Patient Position: Sitting, Cuff Size: Adult Large)  Pulse 89  Temp 98.3  F (36.8  C) (Tympanic)  Resp 18  Ht 4' 10\" (1.473 m)  Wt 160 lb 6.4 oz (72.8 kg)  LMP 06/22/2017  Breastfeeding? No  BMI 33.52 kg/m2 Estimated body mass index is 33.52 kg/(m^2) as calculated from the following:    Height as of this encounter: 4' 10\" (1.473 m).    Weight as of this encounter: 160 lb 6.4 oz (72.8 kg).  Medication Reconciliation: complete   Emily Elizondo, ALLI      "

## 2018-03-30 NOTE — NURSING NOTE
"Initial /65  Pulse 90  Temp 98.7  F (37.1  C) (Tympanic)  Ht 4' 10\" (1.473 m)  Wt 160 lb 12.8 oz (72.9 kg)  LMP 06/22/2017  BMI 33.61 kg/m2 Estimated body mass index is 33.61 kg/(m^2) as calculated from the following:    Height as of this encounter: 4' 10\" (1.473 m).    Weight as of this encounter: 160 lb 12.8 oz (72.9 kg). .      "

## 2018-03-30 NOTE — PROGRESS NOTES
SUBJECTIVE:                                                    Anu Davis is 27 year old female   Chief Complaint   Patient presents with     UTI     40 weeks pregnant. Was evaluated in OB yesterday due to possible water breakage, this was negative. Noticed burining with urination once she returned home. Unsure if something was irritated with the amniotic testing yesterday or if this is a UTI. Has tried increasing fluids, cranberry juice, coconut water. States that the burning sensation decreased when voiding urine after drinking coconut water.         Problem list and histories reviewed & adjusted, as indicated.  Additional history: as documented    Patient Active Problem List   Diagnosis     Prenatal care, first pregnancy     Supervision of normal first pregnancy     Past Surgical History:   Procedure Laterality Date     WRIST SURGERY Right 2011    ganglion removal       Social History   Substance Use Topics     Smoking status: Never Smoker     Smokeless tobacco: Never Used     Alcohol use Yes      Comment: rare-quit with pregnancy     Family History   Problem Relation Age of Onset     Depression Mother      situational     Medical History Unknown Mother      mother adopted     Hypertension Father      Emphysema Paternal Grandmother          Current Outpatient Prescriptions   Medication Sig Dispense Refill     Prenatal Vit-Fe Fumarate-FA (PRENATAL MULTIVITAMIN PLUS IRON) 27-0.8 MG TABS per tablet Take 1 tablet by mouth daily       No Known Allergies  No lab results found.   BP Readings from Last 3 Encounters:   03/30/18 114/65   03/29/18 121/68   03/23/18 112/74    Wt Readings from Last 3 Encounters:   03/30/18 160 lb 12.8 oz (72.9 kg)   03/23/18 158 lb 3.2 oz (71.8 kg)   03/15/18 158 lb (71.7 kg)         ROS:  Constitutional, HEENT, cardiovascular, pulmonary, gi and gu systems are negative, except as otherwise noted.    OBJECTIVE:                                                    /65  Pulse 90   "Temp 98.7  F (37.1  C) (Tympanic)  Ht 4' 10\" (1.473 m)  Wt 160 lb 12.8 oz (72.9 kg)  LMP 06/22/2017  BMI 33.61 kg/m2  GENERAL APPEARANCE ADULT: Alert, no acute distress, gravid  PSYCH: mentation appears normal., affect and mood normal  Diagnostic Test Results:  Results for orders placed or performed in visit on 03/30/18   UA reflex to Microscopic and Culture   Result Value Ref Range    Color Urine Yellow     Appearance Urine Clear     Glucose Urine Negative NEG^Negative mg/dL    Bilirubin Urine Negative NEG^Negative    Ketones Urine Negative NEG^Negative mg/dL    Specific Gravity Urine 1.020 1.003 - 1.035    Blood Urine Negative NEG^Negative    pH Urine 6.5 5.0 - 7.0 pH    Protein Albumin Urine Negative NEG^Negative mg/dL    Urobilinogen Urine 0.2 0.2 - 1.0 EU/dL    Nitrite Urine Negative NEG^Negative    Leukocyte Esterase Urine Negative NEG^Negative    Source Midstream Urine           ASSESSMENT/PLAN:                                                    1. Dysuria  Bladder pressure due to 40 week gestation, urine was clear.  Has appointment with OB today.  - UA reflex to Microscopic and Culture    Ashwini Cruz MD  [facility]    "

## 2018-03-30 NOTE — PROGRESS NOTES
"CC: prenatal  S:she went to labor and delivery and wasn't ruptured.  Hurts to urinate after that but had a normal evaluation with Dr. Cruz today.  Good fm.  No lof/vb/dc.  No ctx  /59 (BP Location: Right arm, Patient Position: Sitting, Cuff Size: Adult Large)  Pulse 89  Temp 98.3  F (36.8  C) (Tympanic)  Resp 18  Ht 4' 10\" (1.473 m)  Wt 160 lb 6.4 oz (72.8 kg)  LMP 2017  Breastfeeding? No  BMI 33.52 kg/m2  Desired her membranes swept  EFW 6 lbs  Transabdominal OB US limited portable-vertex, OSCAR 10, good fetal movement.  A/P routine precautions  She would like to be induced next week if doesn't go into labor this weekend.  She has a favorable cervix  Discussed with Anu Davis, the following; indications; the agents and methods of labor augmentation, including risks, benefits, and alternative approaches; and the possible need for  birth. EFW is AGA.    The Labor Induction:what you need to know information sheet was made available to her. Questions and concerns were addressed and patient agrees to above if necessary during the course of her labor.    Josi Garcia MD    "

## 2018-03-30 NOTE — MR AVS SNAPSHOT
"              After Visit Summary   3/30/2018    Anu Davis    MRN: [id number]           Patient Information     Date Of Birth          1990        Visit Information        Provider Department      3/30/2018 11:00 AM Ashwini Cruz MD [facility]        Today's Diagnoses     Dysuria    -  1       Follow-ups after your visit        Your next 10 appointments already scheduled     Mar 30, 2018  4:00 PM CDT   ESTABLISHED PRENATAL with Josi Garcia MD   [facility]    [address] [address]   [phone]              Who to contact     If you have questions or need follow up information about today's clinic visit or your schedule please contact [facility] directly at [phone].  Normal or non-critical lab and imaging results will be communicated to you by MyChart, letter or phone within 4 business days after the clinic has received the results. If you do not hear from us within 7 days, please contact the clinic through MyChart or phone. If you have a critical or abnormal lab result, we will notify you by phone as soon as possible.  Submit refill requests through [employer] or call your pharmacy and they will forward the refill request to us. Please allow 3 business days for your refill to be completed.          Additional Information About Your Visit        MyChart Information     [employer] lets you send messages to your doctor, view your test results, renew your prescriptions, schedule appointments and more. To sign up, go to [email or web address] . Click on \"Log in\" on the left side of the screen, which will take you to the Welcome page. Then click on \"Sign up Now\" on the right side of the page.     You will be asked to enter the access code listed below, as well as some personal information. Please follow the directions to create your username and password.     Your access code is: " "[email or web address]  Expires: 2018 10:34 AM     Your access code will  in 90 days. If you need help or a new code, please call your [facility] or [phone].        Care EveryWhere ID     This is your Care EveryWhere ID. This could be used by other organizations to access your [place] medical records  [id number]        Your Vitals Were     Pulse Temperature Height Last Period BMI (Body Mass Index)       90 98.7  F (37.1  C) (Tympanic) 4' 10\" (1.473 m) 2017 33.61 kg/m2        Blood Pressure from Last 3 Encounters:   18 114/65   18 121/68   18 112/74    Weight from Last 3 Encounters:   18 160 lb 12.8 oz (72.9 kg)   18 158 lb 3.2 oz (71.8 kg)   03/15/18 158 lb (71.7 kg)              We Performed the Following     UA reflex to Microscopic and Culture        Primary Care Provider Fax #    Physician No Ref-Primary [phone]       No address on file        Equal Access to Services     VARGHESE MUNSON : Hadoscar Hurd, uriah vega, zachery plasencia, lucinda sharma . So [facility] [phone].    ATENCIÓN: Si habla español, tiene a mosquera disposición servicios gratuitos de asistencia lingüística. Llame al [phone].    We comply with applicable federal civil rights laws and [place] laws. We do not discriminate on the basis of race, color, national origin, age, disability, sex, sexual orientation, or gender identity.            Thank you!     Thank you for choosing [facility]  for your care. Our goal is always to provide you with excellent care. Hearing back from our patients is one way we can continue to improve our services. Please take a few minutes to complete the written survey that you may receive in the mail after your visit with us. Thank you!             Your Updated Medication List - Protect others around you: Learn how to safely use, store and throw away your medicines at www.disposemymeds.org.        "   This list is accurate as of 3/30/18 11:24 AM.  Always use your most recent med list.                   Brand Name Dispense Instructions for use Diagnosis    prenatal multivitamin plus iron 27-0.8 MG Tabs per tablet      Take 1 tablet by mouth daily

## 2018-03-30 NOTE — TELEPHONE ENCOUNTER
D: Anu Davis 1990   40w0d   Patient called due to  UTI symptoms.  Reports that she last saw here 3/29/18.  Last cervical exam: %/0  A: Contractions: none since .  Fluid leaking: clear and none   Vaginal Bleeding: light pink when wipes  Fetal movement: active  Pain:Denies  R: Instructed patient to Follow up with your physician on the next business day if symptoms persist.  Call back if gets worse tonight.  Pt instructed to increase fluids and drink cranberry juice   Instructed to call back with update if indicated or any concerns including increasing symptoms.  Discussed plan with patient and she agrees.

## 2018-03-30 NOTE — MR AVS SNAPSHOT
"              After Visit Summary   3/30/2018    Anu Davis    MRN: [id number]           Patient Information     Date Of Birth          1990        Visit Information        Provider Department      3/30/2018 4:00 PM Josi Garcia MD [facility]        Today's Diagnoses     Encounter for prenatal care in third trimester of first pregnancy    -  1       Follow-ups after your visit        Who to contact     If you have questions or need follow up information about today's clinic visit or your schedule please contact [facility] directly at [phone].  Normal or non-critical lab and imaging results will be communicated to you by MyChart, letter or phone within 4 business days after the clinic has received the results. If you do not hear from us within 7 days, please contact the clinic through [employer] or phone. If you have a critical or abnormal lab result, we will notify you by phone as soon as possible.  Submit refill requests through [employer] or call your pharmacy and they will forward the refill request to us. Please allow 3 business days for your refill to be completed.          Additional Information About Your Visit        MyChart Information     [employer] lets you send messages to your doctor, view your test results, renew your prescriptions, schedule appointments and more. To sign up, go to [email or web address] . Click on \"Log in\" on the left side of the screen, which will take you to the Welcome page. Then click on \"Sign up Now\" on the right side of the page.     You will be asked to enter the access code listed below, as well as some personal information. Please follow the directions to create your username and password.     Your access code is: [email or web address]  Expires: 2018 10:34 AM     Your access code will  in 90 days. If you need help or a new code, please call your [facility] or [phone].        Care EveryWhere ID     This is your " "Care EveryWhere ID. This could be used by other organizations to access your [place] medical records  [id number]        Your Vitals Were     Pulse Temperature Respirations Height Last Period Breastfeeding?    89 98.3  F (36.8  C) (Tympanic) 18 4' 10\" (1.473 m) 06/22/2017 No    BMI (Body Mass Index)                   33.52 kg/m2            Blood Pressure from Last 3 Encounters:   03/30/18 119/59   03/30/18 114/65   03/29/18 121/68    Weight from Last 3 Encounters:   03/30/18 160 lb 6.4 oz (72.8 kg)   03/30/18 160 lb 12.8 oz (72.9 kg)   03/23/18 158 lb 3.2 oz (71.8 kg)              We Performed the Following     US OB Limited One Or More Fetuses Port        Primary Care Provider Fax #    Physician No Ref-Primary [phone]       No address on file        Equal Access to Services     VARGHESE MUNSON : Hadii zeyad Hurd, waaxda lufacundo, qaybta kaalmada luis angel, lucinda sharma . So [facility] [phone].    ATENCIÓN: Si habla español, tiene a mosquera disposición servicios gratuitos de asistencia lingüística. Llame al [phone].    We comply with applicable federal civil rights laws and [place] laws. We do not discriminate on the basis of race, color, national origin, age, disability, sex, sexual orientation, or gender identity.            Thank you!     Thank you for choosing [facility]  for your care. Our goal is always to provide you with excellent care. Hearing back from our patients is one way we can continue to improve our services. Please take a few minutes to complete the written survey that you may receive in the mail after your visit with us. Thank you!             Your Updated Medication List - Protect others around you: Learn how to safely use, store and throw away your medicines at www.disposemymeds.org.          This list is accurate as of 3/30/18  5:11 PM.  Always use your most recent med list.                   Brand Name Dispense Instructions for use " Diagnosis    prenatal multivitamin plus iron 27-0.8 MG Tabs per tablet      Take 1 tablet by mouth daily

## 2018-04-01 ENCOUNTER — ANESTHESIA (OUTPATIENT)
Dept: SURGERY | Facility: CLINIC | Age: 28
End: 2018-04-01
Payer: COMMERCIAL

## 2018-04-01 ENCOUNTER — HOSPITAL ENCOUNTER (INPATIENT)
Facility: CLINIC | Age: 28
LOS: 3 days | Discharge: HOME | End: 2018-04-04
Attending: OBSTETRICS & GYNECOLOGY | Admitting: OBSTETRICS & GYNECOLOGY
Payer: COMMERCIAL

## 2018-04-01 ENCOUNTER — ANESTHESIA EVENT (OUTPATIENT)
Dept: SURGERY | Facility: CLINIC | Age: 28
End: 2018-04-01
Payer: COMMERCIAL

## 2018-04-01 ENCOUNTER — SURGERY (OUTPATIENT)
Age: 28
End: 2018-04-01

## 2018-04-01 ENCOUNTER — ANESTHESIA (OUTPATIENT)
Dept: OBGYN | Facility: CLINIC | Age: 28
End: 2018-04-01
Payer: COMMERCIAL

## 2018-04-01 ENCOUNTER — ANESTHESIA EVENT (OUTPATIENT)
Dept: OBGYN | Facility: CLINIC | Age: 28
End: 2018-04-01
Payer: COMMERCIAL

## 2018-04-01 DIAGNOSIS — Z98.891 S/P C-SECTION: Primary | ICD-10-CM

## 2018-04-01 PROBLEM — Z34.90 PREGNANCY: Status: ACTIVE | Noted: 2018-04-01

## 2018-04-01 LAB
ABO + RH BLD: NORMAL
ABO + RH BLD: NORMAL
BLD GP AB SCN SERPL QL: NORMAL
BLOOD BANK CMNT PATIENT-IMP: NORMAL
SPECIMEN EXP DATE BLD: NORMAL
T PALLIDUM IGG+IGM SER QL: NEGATIVE

## 2018-04-01 PROCEDURE — 25000128 H RX IP 250 OP 636: Performed by: NURSE ANESTHETIST, CERTIFIED REGISTERED

## 2018-04-01 PROCEDURE — 36000056 ZZH SURGERY LEVEL 3 1ST 30 MIN: Performed by: OBSTETRICS & GYNECOLOGY

## 2018-04-01 PROCEDURE — 25000132 ZZH RX MED GY IP 250 OP 250 PS 637: Performed by: OBSTETRICS & GYNECOLOGY

## 2018-04-01 PROCEDURE — 86900 BLOOD TYPING SEROLOGIC ABO: CPT | Performed by: OBSTETRICS & GYNECOLOGY

## 2018-04-01 PROCEDURE — 12000031 ZZH R&B OB CRITICAL

## 2018-04-01 PROCEDURE — 27210794 ZZH OR GENERAL SUPPLY STERILE: Performed by: OBSTETRICS & GYNECOLOGY

## 2018-04-01 PROCEDURE — 36000058 ZZH SURGERY LEVEL 3 EA 15 ADDTL MIN: Performed by: OBSTETRICS & GYNECOLOGY

## 2018-04-01 PROCEDURE — 37000011 ZZH ANESTHESIA WARD SERVICE: Performed by: NURSE ANESTHETIST, CERTIFIED REGISTERED

## 2018-04-01 PROCEDURE — C1765 ADHESION BARRIER: HCPCS | Performed by: OBSTETRICS & GYNECOLOGY

## 2018-04-01 PROCEDURE — 59515 CESAREAN DELIVERY: CPT | Performed by: OBSTETRICS & GYNECOLOGY

## 2018-04-01 PROCEDURE — 25000125 ZZHC RX 250: Performed by: NURSE ANESTHETIST, CERTIFIED REGISTERED

## 2018-04-01 PROCEDURE — 27210995 ZZH RX 272: Performed by: OBSTETRICS & GYNECOLOGY

## 2018-04-01 PROCEDURE — 86901 BLOOD TYPING SEROLOGIC RH(D): CPT | Performed by: OBSTETRICS & GYNECOLOGY

## 2018-04-01 PROCEDURE — 71000012 ZZH RECOVERY PHASE 1 LEVEL 1 FIRST HR: Performed by: OBSTETRICS & GYNECOLOGY

## 2018-04-01 PROCEDURE — 3E0R3BZ INTRODUCTION OF ANESTHETIC AGENT INTO SPINAL CANAL, PERCUTANEOUS APPROACH: ICD-10-PCS | Performed by: NURSE ANESTHETIST, CERTIFIED REGISTERED

## 2018-04-01 PROCEDURE — 59514 CESAREAN DELIVERY ONLY: CPT | Mod: AS | Performed by: PHYSICIAN ASSISTANT

## 2018-04-01 PROCEDURE — 86850 RBC ANTIBODY SCREEN: CPT | Performed by: OBSTETRICS & GYNECOLOGY

## 2018-04-01 PROCEDURE — 00HU33Z INSERTION OF INFUSION DEVICE INTO SPINAL CANAL, PERCUTANEOUS APPROACH: ICD-10-PCS | Performed by: NURSE ANESTHETIST, CERTIFIED REGISTERED

## 2018-04-01 PROCEDURE — 10907ZC DRAINAGE OF AMNIOTIC FLUID, THERAPEUTIC FROM PRODUCTS OF CONCEPTION, VIA NATURAL OR ARTIFICIAL OPENING: ICD-10-PCS | Performed by: OBSTETRICS & GYNECOLOGY

## 2018-04-01 PROCEDURE — 37000009 ZZH ANESTHESIA TECHNICAL FEE, EACH ADDTL 15 MIN: Performed by: OBSTETRICS & GYNECOLOGY

## 2018-04-01 PROCEDURE — 37000008 ZZH ANESTHESIA TECHNICAL FEE, 1ST 30 MIN: Performed by: OBSTETRICS & GYNECOLOGY

## 2018-04-01 PROCEDURE — 59514 CESAREAN DELIVERY ONLY: CPT | Mod: AS | Performed by: NURSE PRACTITIONER

## 2018-04-01 PROCEDURE — 86780 TREPONEMA PALLIDUM: CPT | Performed by: OBSTETRICS & GYNECOLOGY

## 2018-04-01 PROCEDURE — 40000671 ZZH STATISTIC ANESTHESIA CASE

## 2018-04-01 PROCEDURE — 25000128 H RX IP 250 OP 636: Performed by: OBSTETRICS & GYNECOLOGY

## 2018-04-01 RX ORDER — OXYTOCIN/0.9 % SODIUM CHLORIDE 30/500 ML
100 PLASTIC BAG, INJECTION (ML) INTRAVENOUS CONTINUOUS
Status: DISCONTINUED | OUTPATIENT
Start: 2018-04-02 | End: 2018-04-04 | Stop reason: HOSPADM

## 2018-04-01 RX ORDER — IBUPROFEN 800 MG/1
800 TABLET, FILM COATED ORAL EVERY 6 HOURS PRN
Status: DISCONTINUED | OUTPATIENT
Start: 2018-04-02 | End: 2018-04-04 | Stop reason: HOSPADM

## 2018-04-01 RX ORDER — HYDROCORTISONE 2.5 %
CREAM (GRAM) TOPICAL 3 TIMES DAILY PRN
Status: DISCONTINUED | OUTPATIENT
Start: 2018-04-01 | End: 2018-04-04 | Stop reason: HOSPADM

## 2018-04-01 RX ORDER — OXYTOCIN/0.9 % SODIUM CHLORIDE 30/500 ML
PLASTIC BAG, INJECTION (ML) INTRAVENOUS PRN
Status: DISCONTINUED | OUTPATIENT
Start: 2018-04-01 | End: 2018-04-01

## 2018-04-01 RX ORDER — CARBOPROST TROMETHAMINE 250 UG/ML
250 INJECTION, SOLUTION INTRAMUSCULAR
Status: DISCONTINUED | OUTPATIENT
Start: 2018-04-01 | End: 2018-04-01

## 2018-04-01 RX ORDER — DIPHENHYDRAMINE HCL 25 MG
25 CAPSULE ORAL EVERY 6 HOURS PRN
Status: DISCONTINUED | OUTPATIENT
Start: 2018-04-01 | End: 2018-04-04 | Stop reason: HOSPADM

## 2018-04-01 RX ORDER — EPHEDRINE SULFATE 50 MG/ML
INJECTION, SOLUTION INTRAMUSCULAR; INTRAVENOUS; SUBCUTANEOUS
Status: DISCONTINUED
Start: 2018-04-01 | End: 2018-04-01 | Stop reason: WASHOUT

## 2018-04-01 RX ORDER — LIDOCAINE 40 MG/G
CREAM TOPICAL
Status: DISCONTINUED | OUTPATIENT
Start: 2018-04-01 | End: 2018-04-01 | Stop reason: HOSPADM

## 2018-04-01 RX ORDER — LIDOCAINE HCL/EPINEPHRINE/PF 2%-1:200K
VIAL (ML) INJECTION PRN
Status: DISCONTINUED | OUTPATIENT
Start: 2018-04-01 | End: 2018-04-01

## 2018-04-01 RX ORDER — IBUPROFEN 400 MG/1
400 TABLET, FILM COATED ORAL EVERY 6 HOURS PRN
Status: DISCONTINUED | OUTPATIENT
Start: 2018-04-02 | End: 2018-04-04 | Stop reason: HOSPADM

## 2018-04-01 RX ORDER — DIPHENHYDRAMINE HYDROCHLORIDE 50 MG/ML
25 INJECTION INTRAMUSCULAR; INTRAVENOUS EVERY 6 HOURS PRN
Status: DISCONTINUED | OUTPATIENT
Start: 2018-04-01 | End: 2018-04-04 | Stop reason: HOSPADM

## 2018-04-01 RX ORDER — AMOXICILLIN 250 MG
1 CAPSULE ORAL 2 TIMES DAILY PRN
Status: DISCONTINUED | OUTPATIENT
Start: 2018-04-01 | End: 2018-04-04 | Stop reason: HOSPADM

## 2018-04-01 RX ORDER — LIDOCAINE HYDROCHLORIDE 10 MG/ML
INJECTION, SOLUTION INFILTRATION; PERINEURAL PRN
Status: DISCONTINUED | OUTPATIENT
Start: 2018-04-01 | End: 2018-04-01

## 2018-04-01 RX ORDER — LIDOCAINE 40 MG/G
CREAM TOPICAL
Status: DISCONTINUED | OUTPATIENT
Start: 2018-04-01 | End: 2018-04-04 | Stop reason: HOSPADM

## 2018-04-01 RX ORDER — SODIUM CHLORIDE, SODIUM LACTATE, POTASSIUM CHLORIDE, CALCIUM CHLORIDE 600; 310; 30; 20 MG/100ML; MG/100ML; MG/100ML; MG/100ML
INJECTION, SOLUTION INTRAVENOUS CONTINUOUS
Status: DISCONTINUED | OUTPATIENT
Start: 2018-04-01 | End: 2018-04-01

## 2018-04-01 RX ORDER — OXYTOCIN/0.9 % SODIUM CHLORIDE 30/500 ML
340 PLASTIC BAG, INJECTION (ML) INTRAVENOUS CONTINUOUS PRN
Status: DISCONTINUED | OUTPATIENT
Start: 2018-04-01 | End: 2018-04-04 | Stop reason: HOSPADM

## 2018-04-01 RX ORDER — METHYLERGONOVINE MALEATE 0.2 MG/ML
200 INJECTION INTRAVENOUS
Status: DISCONTINUED | OUTPATIENT
Start: 2018-04-01 | End: 2018-04-01

## 2018-04-01 RX ORDER — EPHEDRINE SULFATE 50 MG/ML
5 INJECTION, SOLUTION INTRAMUSCULAR; INTRAVENOUS; SUBCUTANEOUS
Status: DISCONTINUED | OUTPATIENT
Start: 2018-04-01 | End: 2018-04-02

## 2018-04-01 RX ORDER — SIMETHICONE 80 MG
80 TABLET,CHEWABLE ORAL 4 TIMES DAILY PRN
Status: DISCONTINUED | OUTPATIENT
Start: 2018-04-01 | End: 2018-04-04 | Stop reason: HOSPADM

## 2018-04-01 RX ORDER — LANOLIN 100 %
OINTMENT (GRAM) TOPICAL
Status: DISCONTINUED | OUTPATIENT
Start: 2018-04-01 | End: 2018-04-04 | Stop reason: HOSPADM

## 2018-04-01 RX ORDER — OXYTOCIN 10 [USP'U]/ML
10 INJECTION, SOLUTION INTRAMUSCULAR; INTRAVENOUS
Status: DISCONTINUED | OUTPATIENT
Start: 2018-04-01 | End: 2018-04-04 | Stop reason: HOSPADM

## 2018-04-01 RX ORDER — MISOPROSTOL 200 UG/1
400 TABLET ORAL
Status: DISCONTINUED | OUTPATIENT
Start: 2018-04-01 | End: 2018-04-04 | Stop reason: HOSPADM

## 2018-04-01 RX ORDER — HYDROMORPHONE HYDROCHLORIDE 1 MG/ML
.3-.5 INJECTION, SOLUTION INTRAMUSCULAR; INTRAVENOUS; SUBCUTANEOUS EVERY 30 MIN PRN
Status: DISCONTINUED | OUTPATIENT
Start: 2018-04-01 | End: 2018-04-04 | Stop reason: HOSPADM

## 2018-04-01 RX ORDER — NALOXONE HYDROCHLORIDE 0.4 MG/ML
.1-.4 INJECTION, SOLUTION INTRAMUSCULAR; INTRAVENOUS; SUBCUTANEOUS
Status: DISCONTINUED | OUTPATIENT
Start: 2018-04-01 | End: 2018-04-01

## 2018-04-01 RX ORDER — NALOXONE HYDROCHLORIDE 0.4 MG/ML
.1-.4 INJECTION, SOLUTION INTRAMUSCULAR; INTRAVENOUS; SUBCUTANEOUS
Status: DISCONTINUED | OUTPATIENT
Start: 2018-04-01 | End: 2018-04-02

## 2018-04-01 RX ORDER — ONDANSETRON 2 MG/ML
INJECTION INTRAMUSCULAR; INTRAVENOUS PRN
Status: DISCONTINUED | OUTPATIENT
Start: 2018-04-01 | End: 2018-04-01

## 2018-04-01 RX ORDER — OXYTOCIN 10 [USP'U]/ML
10 INJECTION, SOLUTION INTRAMUSCULAR; INTRAVENOUS
Status: DISCONTINUED | OUTPATIENT
Start: 2018-04-01 | End: 2018-04-01

## 2018-04-01 RX ORDER — OXYTOCIN/0.9 % SODIUM CHLORIDE 30/500 ML
100-340 PLASTIC BAG, INJECTION (ML) INTRAVENOUS CONTINUOUS PRN
Status: DISCONTINUED | OUTPATIENT
Start: 2018-04-01 | End: 2018-04-01

## 2018-04-01 RX ORDER — OXYCODONE HYDROCHLORIDE 5 MG/1
5-10 TABLET ORAL
Status: DISCONTINUED | OUTPATIENT
Start: 2018-04-01 | End: 2018-04-04 | Stop reason: HOSPADM

## 2018-04-01 RX ORDER — IBUPROFEN 800 MG/1
800 TABLET, FILM COATED ORAL
Status: DISCONTINUED | OUTPATIENT
Start: 2018-04-01 | End: 2018-04-01

## 2018-04-01 RX ORDER — ACETAMINOPHEN 325 MG/1
975 TABLET ORAL EVERY 8 HOURS
Status: DISCONTINUED | OUTPATIENT
Start: 2018-04-02 | End: 2018-04-04 | Stop reason: HOSPADM

## 2018-04-01 RX ORDER — BISACODYL 10 MG
10 SUPPOSITORY, RECTAL RECTAL DAILY PRN
Status: DISCONTINUED | OUTPATIENT
Start: 2018-04-03 | End: 2018-04-04 | Stop reason: HOSPADM

## 2018-04-01 RX ORDER — ONDANSETRON 2 MG/ML
4 INJECTION INTRAMUSCULAR; INTRAVENOUS EVERY 6 HOURS PRN
Status: DISCONTINUED | OUTPATIENT
Start: 2018-04-01 | End: 2018-04-01

## 2018-04-01 RX ORDER — AMOXICILLIN 250 MG
2 CAPSULE ORAL 2 TIMES DAILY PRN
Status: DISCONTINUED | OUTPATIENT
Start: 2018-04-01 | End: 2018-04-04 | Stop reason: HOSPADM

## 2018-04-01 RX ORDER — IBUPROFEN 600 MG/1
600 TABLET, FILM COATED ORAL EVERY 6 HOURS PRN
Status: DISCONTINUED | OUTPATIENT
Start: 2018-04-02 | End: 2018-04-04 | Stop reason: HOSPADM

## 2018-04-01 RX ORDER — OXYCODONE AND ACETAMINOPHEN 5; 325 MG/1; MG/1
1 TABLET ORAL
Status: DISCONTINUED | OUTPATIENT
Start: 2018-04-01 | End: 2018-04-01

## 2018-04-01 RX ORDER — CITRIC ACID/SODIUM CITRATE 334-500MG
30 SOLUTION, ORAL ORAL
Status: COMPLETED | OUTPATIENT
Start: 2018-04-01 | End: 2018-04-01

## 2018-04-01 RX ORDER — DEXTROSE, SODIUM CHLORIDE, SODIUM LACTATE, POTASSIUM CHLORIDE, AND CALCIUM CHLORIDE 5; .6; .31; .03; .02 G/100ML; G/100ML; G/100ML; G/100ML; G/100ML
INJECTION, SOLUTION INTRAVENOUS CONTINUOUS
Status: DISCONTINUED | OUTPATIENT
Start: 2018-04-02 | End: 2018-04-04 | Stop reason: HOSPADM

## 2018-04-01 RX ORDER — SODIUM CHLORIDE, SODIUM LACTATE, POTASSIUM CHLORIDE, CALCIUM CHLORIDE 600; 310; 30; 20 MG/100ML; MG/100ML; MG/100ML; MG/100ML
INJECTION, SOLUTION INTRAVENOUS CONTINUOUS
Status: DISCONTINUED | OUTPATIENT
Start: 2018-04-01 | End: 2018-04-01 | Stop reason: HOSPADM

## 2018-04-01 RX ORDER — KETOROLAC TROMETHAMINE 30 MG/ML
30 INJECTION, SOLUTION INTRAMUSCULAR; INTRAVENOUS EVERY 6 HOURS
Status: ACTIVE | OUTPATIENT
Start: 2018-04-02 | End: 2018-04-03

## 2018-04-01 RX ORDER — MORPHINE SULFATE 1 MG/ML
INJECTION, SOLUTION EPIDURAL; INTRATHECAL; INTRAVENOUS PRN
Status: DISCONTINUED | OUTPATIENT
Start: 2018-04-01 | End: 2018-04-01

## 2018-04-01 RX ORDER — NALOXONE HYDROCHLORIDE 0.4 MG/ML
.1-.4 INJECTION, SOLUTION INTRAMUSCULAR; INTRAVENOUS; SUBCUTANEOUS
Status: DISCONTINUED | OUTPATIENT
Start: 2018-04-01 | End: 2018-04-04 | Stop reason: HOSPADM

## 2018-04-01 RX ORDER — FENTANYL CITRATE 50 UG/ML
INJECTION, SOLUTION INTRAMUSCULAR; INTRAVENOUS PRN
Status: DISCONTINUED | OUTPATIENT
Start: 2018-04-01 | End: 2018-04-01

## 2018-04-01 RX ORDER — BUPIVACAINE HYDROCHLORIDE 2.5 MG/ML
INJECTION, SOLUTION EPIDURAL; INFILTRATION; INTRACAUDAL PRN
Status: DISCONTINUED | OUTPATIENT
Start: 2018-04-01 | End: 2018-04-01

## 2018-04-01 RX ORDER — ONDANSETRON 2 MG/ML
4 INJECTION INTRAMUSCULAR; INTRAVENOUS EVERY 6 HOURS PRN
Status: DISCONTINUED | OUTPATIENT
Start: 2018-04-01 | End: 2018-04-04 | Stop reason: HOSPADM

## 2018-04-01 RX ORDER — ACETAMINOPHEN 325 MG/1
650 TABLET ORAL EVERY 4 HOURS PRN
Status: DISCONTINUED | OUTPATIENT
Start: 2018-04-04 | End: 2018-04-04 | Stop reason: HOSPADM

## 2018-04-01 RX ORDER — ACETAMINOPHEN 325 MG/1
650 TABLET ORAL EVERY 4 HOURS PRN
Status: DISCONTINUED | OUTPATIENT
Start: 2018-04-01 | End: 2018-04-01

## 2018-04-01 RX ORDER — CEFAZOLIN SODIUM 2 G/100ML
2 INJECTION, SOLUTION INTRAVENOUS
Status: COMPLETED | OUTPATIENT
Start: 2018-04-01 | End: 2018-04-01

## 2018-04-01 RX ORDER — NALBUPHINE HYDROCHLORIDE 10 MG/ML
2.5-5 INJECTION, SOLUTION INTRAMUSCULAR; INTRAVENOUS; SUBCUTANEOUS EVERY 6 HOURS PRN
Status: DISCONTINUED | OUTPATIENT
Start: 2018-04-01 | End: 2018-04-02

## 2018-04-01 RX ORDER — KETOROLAC TROMETHAMINE 30 MG/ML
INJECTION, SOLUTION INTRAMUSCULAR; INTRAVENOUS PRN
Status: DISCONTINUED | OUTPATIENT
Start: 2018-04-01 | End: 2018-04-01

## 2018-04-01 RX ORDER — DEXAMETHASONE SODIUM PHOSPHATE 4 MG/ML
INJECTION, SOLUTION INTRA-ARTICULAR; INTRALESIONAL; INTRAMUSCULAR; INTRAVENOUS; SOFT TISSUE PRN
Status: DISCONTINUED | OUTPATIENT
Start: 2018-04-01 | End: 2018-04-01

## 2018-04-01 RX ORDER — PRENATAL VIT/IRON FUM/FOLIC AC 27MG-0.8MG
1 TABLET ORAL DAILY
Status: DISCONTINUED | OUTPATIENT
Start: 2018-04-02 | End: 2018-04-04 | Stop reason: HOSPADM

## 2018-04-01 RX ORDER — LIDOCAINE HYDROCHLORIDE AND EPINEPHRINE 15; 5 MG/ML; UG/ML
INJECTION, SOLUTION EPIDURAL PRN
Status: DISCONTINUED | OUTPATIENT
Start: 2018-04-01 | End: 2018-04-01

## 2018-04-01 RX ADMIN — FENTANYL CITRATE 100 MCG: 50 INJECTION, SOLUTION INTRAMUSCULAR; INTRAVENOUS at 21:31

## 2018-04-01 RX ADMIN — FENTANYL CITRATE 8 ML/HR: 50 INJECTION, SOLUTION INTRAMUSCULAR; INTRAVENOUS at 09:17

## 2018-04-01 RX ADMIN — ONDANSETRON 4 MG: 2 INJECTION INTRAMUSCULAR; INTRAVENOUS at 21:51

## 2018-04-01 RX ADMIN — LIDOCAINE HYDROCHLORIDE,EPINEPHRINE BITARTRATE 5 ML: 20; .005 INJECTION, SOLUTION EPIDURAL; INFILTRATION; INTRACAUDAL; PERINEURAL at 21:30

## 2018-04-01 RX ADMIN — SODIUM CHLORIDE, POTASSIUM CHLORIDE, SODIUM LACTATE AND CALCIUM CHLORIDE: 600; 310; 30; 20 INJECTION, SOLUTION INTRAVENOUS at 06:04

## 2018-04-01 RX ADMIN — LIDOCAINE HYDROCHLORIDE AND EPINEPHRINE 75 MG: 15; 5 INJECTION, SOLUTION EPIDURAL at 09:14

## 2018-04-01 RX ADMIN — OXYTOCIN-SODIUM CHLORIDE 0.9% IV SOLN 30 UNIT/500ML 300 ML: 30-0.9/5 SOLUTION at 21:48

## 2018-04-01 RX ADMIN — BUPIVACAINE HYDROCHLORIDE 5 ML: 2.5 INJECTION, SOLUTION EPIDURAL; INFILTRATION; INTRACAUDAL at 21:51

## 2018-04-01 RX ADMIN — SODIUM CHLORIDE, POTASSIUM CHLORIDE, SODIUM LACTATE AND CALCIUM CHLORIDE 250 ML: 600; 310; 30; 20 INJECTION, SOLUTION INTRAVENOUS at 09:20

## 2018-04-01 RX ADMIN — SODIUM CHLORIDE, POTASSIUM CHLORIDE, SODIUM LACTATE AND CALCIUM CHLORIDE 1000 ML: 600; 310; 30; 20 INJECTION, SOLUTION INTRAVENOUS at 05:02

## 2018-04-01 RX ADMIN — SODIUM CHLORIDE, POTASSIUM CHLORIDE, SODIUM LACTATE AND CALCIUM CHLORIDE: 600; 310; 30; 20 INJECTION, SOLUTION INTRAVENOUS at 15:09

## 2018-04-01 RX ADMIN — SODIUM CITRATE AND CITRIC ACID MONOHYDRATE 30 ML: 500; 334 SOLUTION ORAL at 21:17

## 2018-04-01 RX ADMIN — Medication 1 KIT: at 22:10

## 2018-04-01 RX ADMIN — OXYTOCIN-SODIUM CHLORIDE 0.9% IV SOLN 30 UNIT/500ML 100 ML: 30-0.9/5 SOLUTION at 22:39

## 2018-04-01 RX ADMIN — ACETAMINOPHEN 650 MG: 325 TABLET, FILM COATED ORAL at 15:37

## 2018-04-01 RX ADMIN — CEFAZOLIN SODIUM 2 G: 2 INJECTION, SOLUTION INTRAVENOUS at 21:16

## 2018-04-01 RX ADMIN — LIDOCAINE HYDROCHLORIDE,EPINEPHRINE BITARTRATE 5 ML: 20; .005 INJECTION, SOLUTION EPIDURAL; INFILTRATION; INTRACAUDAL; PERINEURAL at 21:38

## 2018-04-01 RX ADMIN — DEXAMETHASONE SODIUM PHOSPHATE 4 MG: 4 INJECTION, SOLUTION INTRA-ARTICULAR; INTRALESIONAL; INTRAMUSCULAR; INTRAVENOUS; SOFT TISSUE at 21:51

## 2018-04-01 RX ADMIN — OXYTOCIN-SODIUM CHLORIDE 0.9% IV SOLN 30 UNIT/500ML 200 ML: 30-0.9/5 SOLUTION at 22:02

## 2018-04-01 RX ADMIN — KETOROLAC TROMETHAMINE 30 MG: 30 INJECTION, SOLUTION INTRAMUSCULAR at 21:51

## 2018-04-01 RX ADMIN — LIDOCAINE HYDROCHLORIDE,EPINEPHRINE BITARTRATE 5 ML: 20; .005 INJECTION, SOLUTION EPIDURAL; INFILTRATION; INTRACAUDAL; PERINEURAL at 21:33

## 2018-04-01 RX ADMIN — MORPHINE SULFATE 3 MG: 1 INJECTION, SOLUTION EPIDURAL; INTRATHECAL; INTRAVENOUS at 21:53

## 2018-04-01 RX ADMIN — LIDOCAINE HYDROCHLORIDE 50 MG: 10 INJECTION, SOLUTION INFILTRATION; PERINEURAL at 09:10

## 2018-04-01 NOTE — PROGRESS NOTES
Prime 40+3 arrived to the birth center at 0430 stating she has been page since 0230. SVE 5/80/+1 no LOF. Cat 1 tracing. Page 1-2 min apart. IV placed and bolus started. Pt would like to try to go through labor with no pain meds but is open to having something if the pain gets to be to much. Dr. Pelletier called and orders received. Orientated to room and surroundings. Call light in reach. Will cont to monitor.

## 2018-04-01 NOTE — ANESTHESIA PREPROCEDURE EVALUATION
Anesthesia Evaluation       history and physical reviewed .      No history of anesthetic complications          ROS/MED HX    ENT/Pulmonary:  - neg pulmonary ROS     Neurologic:  - neg neurologic ROS     Cardiovascular:  - neg cardiovascular ROS       METS/Exercise Tolerance:     Hematologic:         Musculoskeletal:         GI/Hepatic:  - neg GI/hepatic ROS       Renal/Genitourinary:         Endo:         Psychiatric:         Infectious Disease:         Malignancy:         Other:                     Physical Exam  Normal systems: cardiovascular, pulmonary and dental    Airway   Mallampati: I  TM distance: > 3 FB  Neck ROM: full  Mouth opening: > 3 cm    Dental     Cardiovascular       Pulmonary           neg OB ROS                 Anesthesia Plan      History & Physical Review  History and physical reviewed and following examination; no interval change.    ASA Status:  1 .  OB Epidural Asa: 1   NPO Status:  > 6 hours    Plan for Epidural   PONV prophylaxis:  Ondansetron (or other 5HT-3) and Dexamethasone or Solumedrol       Postoperative Care      Consents  Anesthetic plan, risks, benefits and alternatives discussed with:  Patient and Patient..                          .

## 2018-04-01 NOTE — PLAN OF CARE
Problem: Patient Care Overview  Goal: Plan of Care/Patient Progress Review  Outcome: Improving  SVE complete and +2 at 1730, feels OP, room posterior, small amount of caput, pt sleeping. Peanut ball utilized. Family at bedside. Laboring down, no sensation to push, slight feeling of pressure. Will continue to assess and monitor.

## 2018-04-01 NOTE — PLAN OF CARE
Problem: Patient Care Overview  Goal: Plan of Care/Patient Progress Review  Outcome: Improving  Pt SVE 7-8cm with a bulging bag. Called Dr. Pelletier with an update and to come and rupture pt. Dr. Pelletier is on his way.

## 2018-04-01 NOTE — PLAN OF CARE
Problem: Labor (Cervical Ripen, Induct, Augment) (Adult,Obstetrics,Pediatric)  Goal: Signs and Symptoms of Listed Potential Problems Will be Absent, Minimized or Managed (Labor)  Signs and symptoms of listed potential problems will be absent, minimized or managed by discharge/transition of care (reference Labor (Cervical Ripen, Induct, Augment) (Adult,Obstetrics,Pediatric) CPG).   Outcome: Improving  Epidural was placed and dosed today at 0900 after her request and has been working very well.  FHR  Tracing remains cat 1.

## 2018-04-01 NOTE — IP AVS SNAPSHOT
MRN:[id number]                      After Visit Summary   4/1/2018    Anu Davis    MRN: [id number]           Thank you!     Thank you for choosing [facility] for your care. Our goal is always to provide you with excellent care. Hearing back from our patients is one way we can continue to improve our services. Please take a few minutes to complete the written survey that you may receive in the mail after you visit with us. Thank you!        Patient Information     Date Of Birth          1990        Designated Caregiver       Most Recent Value    Caregiver    Will someone help with your care after discharge? yes    Name of designated caregiver Dimas - spouse    Phone number of caregiver [phone]    Caregiver address [address], [place] [address]      About your hospital stay     You were admitted on:  April 1, 2018 You last received care in the:  [facility]    You were discharged on:  April 4, 2018        Reason for your hospital stay       Maternity care                  Who to Call     For medical emergencies, please call 911.  For non-urgent questions about your medical care, please call your primary care provider or clinic, None  For questions related to your surgery, please call your surgery clinic        Attending Provider     Provider Wang Chi MD OB/Gyn       Primary Care Provider Fax #    Physician No Ref-Primary [phone]      After Care Instructions     Activity       Review discharge instructions            Diet       Resume previous diet            Discharge Instructions - Postpartum visit       Schedule postpartum visit with your provider and return to clinic in 6 weeks.                  Further instructions from your care team       Discharge Instructions and Follow-Up:   Discharge diet: Regular   Discharge activity: No lifting, driving, or strenuous exercise for 6 week(s)  No driving or operating machinery while on  narcotic analgesics  Pelvic rest: abstain from intercourse and do not use tampons for 6 week(s)   Discharge follow-up: Follow up with Dr. KLEIN in 6 weeks   Wound care: Ice to area for comfort  Keep wound clean and dry         Postop  Birth Instructions    Activity       Do not lift more than 10 pounds for 6 weeks after surgery.  Ask family and friends for help when you need it.    No driving until you have stopped taking your pain medications (usually two weeks after surgery).    No heavy exercise or activity for 6 weeks.  Don't do anything that will put a strain on your surgery site.    Don't strain when using the toilet.  Your care team may prescribe a stool softener if you have problems with your bowel movements.     To care for your incision:       Keep the incision clean and dry.    Do not soak your incision in water. No swimming or hot tubs until it has fully healed. You may soak in the bathtub if the water level is below your incision.    Do not use peroxide, gel, cream, lotion, or ointment on your incision.    Adjust your clothes to avoid pressure on your surgery site (check the elastic in your underwear for example).     You may see a small amount of clear or pink drainage and this is normal.  Check with your health care provider:       If the drainage increases or has an odor.    If the incision reddens, you have swelling, or develop a rash.    If you have increased pain and the medicine we prescribed doesn't help.    If you have a fever above 100.4 F (38 C) with or without chills when placing thermometer under your tongue.   The area around your incision (surgery wound), will feel numb.  This is normal. The numbness should go away in less than a year.     Keep your hands clean:  Always wash your hands before touching your incision (surgery wound). This helps reduce your risk of infection. If your hands aren't dirty, you may use an alcohol hand-rub to clean your hands. Keep your nails clean and  short.    Call your healthcare provider if you have any of these symptoms:       You soak a sanitary pad with blood within 1 hour, or you see blood clots larger than a golf ball.    Bleeding that lasts more than 6 weeks.    Vaginal discharge that smells bad.    Severe pain, cramping or tenderness in your lower belly area.    A need to urinate more frequently (use the toilet more often), more urgently (use the toilet very quickly), or it burns when you urinate.    Nausea and vomiting.    Redness, swelling or pain around a vein in your leg.    Problems breastfeeding or a red or painful area on your breast.    Chest pain and cough or are gasping for air.    Problems with coping with sadness, anxiety or depression. If you have concerns about hurting yourself or the baby, call your provider immediately.      You have questions or concerns after you return home.     For problems or questions with breastfeeding after discharge call: [phone] at the [facility] clinic.  After hours you may leave a message and your call will returned the next morning. You may also call the [facility] to answer questions, [phone].    Is pregnancy or parenting NOT what you expected?  Are you sad, having difficulty sleeping, feeling overwhelmed, anxious or having troubling thoughts?  Call [facility] Help Line-Pregnancy & Postpartum Support [place] @ [phone]).  Or see online resources list:  Www.ppsupport[place].org    If you have concerns/questions after returning home, contact the nurse triage line [phone] or your primary care clinic.    Call for follow up appointment to be seen by OB/Gyn in 6 weeks.  [phone]               Pending Results     No orders found from 3/30/2018 to 4/2/2018.            Statement of Approval     Ordered          04/04/18 1126  I have reviewed and agree with all the recommendations and orders detailed in this document.  EFFECTIVE NOW     Approved and electronically signed by:  Josi Garcia  "MD Ruth             Admission Information     Date & Time Provider Department Dept. Phone    2018 Wang Pelletier MD [facility] [phone]      Your Vitals Were     Blood Pressure Pulse Temperature Respirations Last Period Pulse Oximetry    101/56 88 96.4  F (35.8  C) (Axillary) 18 2017 98%      MyChart Information     [facility] lets you send messages to your doctor, view your test results, renew your prescriptions, schedule appointments and more. To sign up, go to [email or web address] . Click on \"Log in\" on the left side of the screen, which will take you to the Welcome page. Then click on \"Sign up Now\" on the right side of the page.     You will be asked to enter the access code listed below, as well as some personal information. Please follow the directions to create your username and password.     Your access code is: [email or web address]  Expires: 2018 10:34 AM     Your access code will  in 90 days. If you need help or a new code, please call your [facility] clinic or [phone].        Care EveryWhere ID     This is your Care EveryWhere ID. This could be used by other organizations to access your [facility] medical records  [id number]        Equal Access to Services     SOLEDAD MUNSON AH: Hadoscar puenteso Soml, waaxda luqadaha, qaybta kaalmada adeegyada, lucinda sharma . So [facility] [phone].    ATENCIÓN: Si habla español, tiene a mosquera disposición servicios gratuitos de asistencia lingüística. Hiro al [phone].    We comply with applicable federal civil rights laws and [place] laws. We do not discriminate on the basis of race, color, national origin, age, disability, sex, sexual orientation, or gender identity.               Review of your medicines      START taking        Dose / Directions    acetaminophen 325 MG tablet   Commonly known as:  TYLENOL        Dose:  975 mg   Take 3 tablets (975 mg) by mouth every 8 hours "   Quantity:  100 tablet   Refills:  0       ibuprofen 800 MG tablet   Commonly known as:  ADVIL/MOTRIN        Dose:  800 mg   Take 1 tablet (800 mg) by mouth every 6 hours as needed for other (cramping)   Quantity:  90 tablet   Refills:  0       oxyCODONE IR 5 MG tablet   Commonly known as:  ROXICODONE        Dose:  5-10 mg   Take 1-2 tablets (5-10 mg) by mouth every 3 hours as needed for other (pain control or improvement in physical function. Hold dose for analgesic side effects.)   Quantity:  20 tablet   Refills:  0       senna-docusate 8.6-50 MG per tablet   Commonly known as:  SENOKOT-S;PERICOLACE        Dose:  2 tablet   Take 2 tablets by mouth 2 times daily as needed for constipation   Quantity:  100 tablet   Refills:  0       simethicone 80 MG chewable tablet   Commonly known as:  MYLICON        Dose:  80 mg   Take 1 tablet (80 mg) by mouth 4 times daily as needed for other (gas)   Quantity:  180 tablet   Refills:  0         CONTINUE these medicines which have NOT CHANGED        Dose / Directions    prenatal multivitamin plus iron 27-0.8 MG Tabs per tablet        Dose:  1 tablet   Take 1 tablet by mouth daily   Refills:  0            Where to get your medicines      These medications were sent to [facility] [place], [place] - [address]  [address] [address]     Phone:  [phone]     acetaminophen 325 MG tablet    ibuprofen 800 MG tablet    senna-docusate 8.6-50 MG per tablet    simethicone 80 MG chewable tablet         Some of these will need a paper prescription and others can be bought over the counter. Ask your nurse if you have questions.     Bring a paper prescription for each of these medications     oxyCODONE IR 5 MG tablet                Protect others around you: Learn how to safely use, store and throw away your medicines at www.disposemymeds.org.        Information about OPIOIDS     PRESCRIPTION OPIOIDS: WHAT YOU NEED TO KNOW    Prescription opioids can be  used to help relieve moderate to severe pain and are often prescribed following a surgery or injury, or for certain health conditions. These medications can be an important part of treatment but also come with serious risks. It is important to work with your health care provider to make sure you are getting the safest, most effective care.    WHAT ARE THE RISKS AND SIDE EFFECTS OF OPIOID USE?  Prescription opioids carry serious risks of addiction and overdose, especially with prolonged use. An opioid overdose, often marked by slowed breathing can cause sudden death. The use of prescription opioids can have a number of side effects as well, even when taken as directed:      Tolerance - meaning you might need to take more of a medication for the same pain relief    Physical dependence - meaning you have symptoms of withdrawal when a medication is stopped    Increased sensitivity to pain    Constipation    Nausea, vomiting, and dry mouth    Sleepiness and dizziness    Confusion    Depression    Low levels of testosterone that can result in lower sex drive, energy, and strength    Itching and sweating    RISKS ARE GREATER WITH:    History of drug misuse, substance use disorder, or overdose    Mental health conditions (such as depression or anxiety)    Sleep apnea    Older age (65 years or older)    Pregnancy    Avoid alcohol while taking prescription opioids.   Also, unless specifically advised by your health care provider, medications to avoid include:    Benzodiazepines (such as Xanax or Valium)    Muscle relaxants (such as Soma or Flexeril)    Hypnotics (such as Ambien or Lunesta)    Other prescription opioids    KNOW YOUR OPTIONS:  Talk to your health care provider about ways to manage your pain that do not involve prescription opioids. Some of these options may actually work better and have fewer risks and side effects:    Pain relievers such as acetaminophen, ibuprofen, and naproxen    Some medications that are  also used for depression or seizures    Physical therapy and exercise    Cognitive behavioral therapy, a psychological, goal-directed approach, in which patients learn how to modify physical, behavioral, and emotional triggers of pain and stress    IF YOU ARE PRESCRIBED OPIOIDS FOR PAIN:    Never take opioids in greater amounts or more often than prescribed    Follow up with your primary health care provider and work together to create a plan on how to manage your pain.    Talk about ways to help manage your pain that do not involve prescription opioids    Talk about all concerns and side effects    Help prevent misuse and abuse    Never sell or share prescription opioids    Never use another person's prescription opioids    Store prescription opioids in a secure place and out of reach of others (this may include visitors, children, friends, and family)    Visit www.cdc.gov/drugoverdose to learn about risks of opioid abuse and overdose    If you believe you may be struggling with addiction, tell your health care provider and ask for guidance or call [facility]'s National Helpline at 2-376-702-HELP    LEARN MORE / www.cdc.gov/drugoverdose/prescribing/guideline.html    Safely dispose of unused prescription opioids: Find your local drug take-back programs and more information about the importance of safe disposal at www.doseofreality.[place].gov             Medication List: This is a list of all your medications and when to take them. Check marks below indicate your daily home schedule. Keep this list as a reference.      Medications           Morning Afternoon Evening Bedtime As Needed    acetaminophen 325 MG tablet   Commonly known as:  TYLENOL   Take 3 tablets (975 mg) by mouth every 8 hours   Last time this was given:  975 mg on 4/4/2018  8:48 AM                                ibuprofen 800 MG tablet   Commonly known as:  ADVIL/MOTRIN   Take 1 tablet (800 mg) by mouth every 6 hours as needed for other (cramping)   Last time  this was given:  800 mg on 4/4/2018 10:39 AM                                oxyCODONE IR 5 MG tablet   Commonly known as:  ROXICODONE   Take 1-2 tablets (5-10 mg) by mouth every 3 hours as needed for other (pain control or improvement in physical function. Hold dose for analgesic side effects.)   Last time this was given:  5 mg on 4/4/2018  3:58 AM                                prenatal multivitamin plus iron 27-0.8 MG Tabs per tablet   Take 1 tablet by mouth daily   Last time this was given:  1 tablet on 4/4/2018  8:48 AM                                senna-docusate 8.6-50 MG per tablet   Commonly known as:  SENOKOT-S;PERICOLACE   Take 2 tablets by mouth 2 times daily as needed for constipation   Last time this was given:  2 tablets on 4/4/2018  8:48 AM                                simethicone 80 MG chewable tablet   Commonly known as:  MYLICON   Take 1 tablet (80 mg) by mouth 4 times daily as needed for other (gas)   Last time this was given:  80 mg on 4/3/2018  9:34 PM

## 2018-04-01 NOTE — H&P
L&D History and Physical   2018  Anu Davis  [id number]      HPI: Anu Davis is a 27 year old  at 40w3d by LMP c/w 7w1d US, here worsening contractions    She states that she is feeling well today.  She denies fever, HA, blurred vision, Nausea, vomiting, CP, SOB, , constipation, diarrhea, vaginal bleeding, LOF, abnormal vaginal discharge, and acute swelling.  +FM.      Complications of Pregnancy:  Patient Active Problem List   Diagnosis     Prenatal care, first pregnancy     Supervision of normal first pregnancy     Pregnancy         OBHX:   Obstetric History       T0      L0     SAB0   TAB0   Ectopic0   Multiple0   Live Births0       # Outcome Date GA Lbr Talib/2nd Weight Sex Delivery Anes PTL Lv   1 Current                   MedicalHX:   Past Medical History:   Diagnosis Date     Chickenpox      Tenosynovitis        SurgicalHX:   Past Surgical History:   Procedure Laterality Date     WRIST SURGERY Right 2011    ganglion removal       Medications:     No current facility-administered medications on file prior to encounter.   Current Outpatient Prescriptions on File Prior to Encounter:  Prenatal Vit-Fe Fumarate-FA (PRENATAL MULTIVITAMIN PLUS IRON) 27-0.8 MG TABS per tablet Take 1 tablet by mouth daily       Allergies:  No Known Allergies    FamilyHX:  Family History   Problem Relation Age of Onset     Depression Mother      situational     Medical History Unknown Mother      mother adopted     Hypertension Father      Emphysema Paternal Grandmother        SocialHX:   Social History     Social History     Marital status:      Spouse name: N/A     Number of children: N/A     Years of education: N/A     Social History Main Topics     Smoking status: Never Smoker     Smokeless tobacco: Never Used     Alcohol use Yes      Comment: rare-quit with pregnancy     Drug use: Not on file     Sexual activity: Yes     Other Topics Concern     Not on file     Social History Narrative        ROS: 10-point ROS negative except as in HPI     Physical Exam:  Vitals:    18 0440 18 0726   BP: 126/78 127/70   Pulse: 86    Resp: 18 16   Temp: 97.5  F (36.4  C) 98  F (36.7  C)   TempSrc: Oral Oral     GEN: resting comfortably in bed, in NAD   CVS: RRR, no murmur appreciated   PULMONARY: CTAB, no increased work of breathing, no cough/wheeze   ABDOMEN: soft, gravid, non-tender, non-distended  EXTREMITIES: trace edema, non tender to palpation  CVX: 5/80/+1  Presentation: cephalic by cervix  EFW: AGA    NST:  FHT: baseline 150, moderate variability, accelerations present, no decelerations  TOCO: q1-5 minutes      Ultrasounds:  Reviewed on Epic and on Media Tab from transferred records from [facility].     Labs:   Results for orders placed or performed during the hospital encounter of 18 (from the past 24 hour(s))   ABO/Rh type and screen   Result Value Ref Range    ABO O     RH(D) Pos     Antibody Screen Neg     Test Valid Only At [facility]        Specimen Expires 2018        Lab Results   Component Value Date    ABO O 2018    RH Pos 2018    AS Neg 2018    HEPBANG Nonreactive 2017    CHPCRT Negative 01/15/2018    TREPAB Negative 08/10/2017    HGB 12.7 2017       GBS Status:   Lab Results   Component Value Date    GBS Negative 2018       No results found for: PAP    A/P: Anu Davis is a 27 year old female  at 40w3d by LMP c/w 7w1d US, here for active labor    Admit for: Active labor; obtain routine labs; will proceed with AROM   Discussed with Anu Davis, the following; indications; the agents and methods of labor augmentation, including risks, benefits, and alternative approaches; and the possible need for  birth. EFW is AGA.   The Labor Induction:what you need to know information sheet was made available to her. Questions and concerns were addressed and patient agrees to above if necessary during the course of her  labor.  FHT: Category 1 tracing  GBS status: Negative; antibiotics not indicated.   Pain management: Per patient request and to be provided by anesthesia staff.     Wang Pelletier MD  [facility]

## 2018-04-01 NOTE — ANESTHESIA PROCEDURE NOTES
Peripheral nerve/Neuraxial procedure note : epidural catheter  Pre-Procedure  Performed by  SKIP MCKEON   Location: OB      Pre-Anesthestic Checklist: patient identified, IV checked, risks and benefits discussed, informed consent, monitors and equipment checked, pre-op evaluation and at physician/surgeon's request    Timeout  Correct Patient: Yes   Correct Procedure: Yes   Correct Site: Yes   Correct Laterality: N/A   Correct Position: Yes   Site Marked: N/A   .   Procedure Documentation    .    Procedure:    Epidural catheter.   (midline approach) Injection technique: LORT saline   Local skin infiltrated with mL of 1% lidocaine.       Patient Prep;mask, sterile gloves, patient draped.  .  Needle: Touhy needle Needle Gauge: 17.    Needle Length (Inches) 3.5  # of attempts: 1 and  # of redirects:  1 .   Catheter: 19 G . .  Catheter threaded easily  3 cm epidural space.  8 cm at skin.   .    Assessment/Narrative  Paresthesias: No.  .  .  Aspiration negative for heme or CSF  . Test dose of mL lidocaine 1.5% w/ 1:200,000 epinephrine at. Test dose negative for signs of intravascular, subdural or intrathecal injection. Comments:    Pt. Tolerated well, FHR stable.

## 2018-04-01 NOTE — IP AVS SNAPSHOT
[facility]    [address] [address]    Phone:  [phone]    Fax:  [phone]                                       After Visit Summary   4/1/2018    Anu Davis    MRN: [id number]           After Visit Summary Signature Page     I have received my discharge instructions, and my questions have been answered. I have discussed any challenges I see with this plan with the nurse or doctor.    ..........................................................................................................................................  Patient/Patient Representative Signature      ..........................................................................................................................................  Patient Representative Print Name and Relationship to Patient    ..................................................               ................................................  Date                                            Time    ..........................................................................................................................................  Reviewed by Signature/Title    ...................................................              ..............................................  Date                                                            Time

## 2018-04-01 NOTE — PLAN OF CARE
Problem: Patient Care Overview  Goal: Plan of Care/Patient Progress Review  Outcome: Improving  Pt in active labor, labor progressing. Pt had large amount of emesis. Cat 1 tracing, early decels. States feeling increased pressure, SVE 9/100/+1 at 1515. Report received by BANDAR Gutierrez. Updated Dr Pelletier. Will continue to assess and monitor

## 2018-04-01 NOTE — PLAN OF CARE
Problem: Patient Care Overview  Goal: Plan of Care/Patient Progress Review  Outcome: Improving  FHT's slightly elevated after large emesis, repositioned pt, IV fluid bolus infusing. Pt and family very open to POC, education and knowledge of the labor process. Will continue to provide support and monitoring of progress.

## 2018-04-02 LAB — HGB BLD-MCNC: 9 G/DL (ref 11.7–15.7)

## 2018-04-02 PROCEDURE — 25000125 ZZHC RX 250: Performed by: OBSTETRICS & GYNECOLOGY

## 2018-04-02 PROCEDURE — 36415 COLL VENOUS BLD VENIPUNCTURE: CPT | Performed by: OBSTETRICS & GYNECOLOGY

## 2018-04-02 PROCEDURE — 25000128 H RX IP 250 OP 636: Performed by: OBSTETRICS & GYNECOLOGY

## 2018-04-02 PROCEDURE — 85018 HEMOGLOBIN: CPT | Performed by: OBSTETRICS & GYNECOLOGY

## 2018-04-02 PROCEDURE — 25000132 ZZH RX MED GY IP 250 OP 250 PS 637: Performed by: OBSTETRICS & GYNECOLOGY

## 2018-04-02 PROCEDURE — 12000027 ZZH R&B OB

## 2018-04-02 RX ADMIN — OXYCODONE HYDROCHLORIDE 5 MG: 5 TABLET ORAL at 15:21

## 2018-04-02 RX ADMIN — OXYTOCIN-SODIUM CHLORIDE 0.9% IV SOLN 30 UNIT/500ML 100 ML/HR: 30-0.9/5 SOLUTION at 02:37

## 2018-04-02 RX ADMIN — KETOROLAC TROMETHAMINE 30 MG: 30 INJECTION, SOLUTION INTRAMUSCULAR at 15:21

## 2018-04-02 RX ADMIN — SENNOSIDES AND DOCUSATE SODIUM 1 TABLET: 8.6; 5 TABLET ORAL at 07:36

## 2018-04-02 RX ADMIN — OXYCODONE HYDROCHLORIDE 5 MG: 5 TABLET ORAL at 12:22

## 2018-04-02 RX ADMIN — OXYCODONE HYDROCHLORIDE 5 MG: 5 TABLET ORAL at 19:05

## 2018-04-02 RX ADMIN — ACETAMINOPHEN 975 MG: 325 TABLET, FILM COATED ORAL at 07:37

## 2018-04-02 RX ADMIN — KETOROLAC TROMETHAMINE 30 MG: 30 INJECTION, SOLUTION INTRAMUSCULAR at 10:14

## 2018-04-02 RX ADMIN — PRENATAL VIT W/ FE FUMARATE-FA TAB 27-0.8 MG 1 TABLET: 27-0.8 TAB at 07:36

## 2018-04-02 RX ADMIN — ACETAMINOPHEN 975 MG: 325 TABLET, FILM COATED ORAL at 00:45

## 2018-04-02 RX ADMIN — OXYCODONE HYDROCHLORIDE 5 MG: 5 TABLET ORAL at 22:07

## 2018-04-02 RX ADMIN — IBUPROFEN 800 MG: 800 TABLET ORAL at 22:13

## 2018-04-02 RX ADMIN — SIMETHICONE CHEW TAB 80 MG 80 MG: 80 TABLET ORAL at 22:06

## 2018-04-02 RX ADMIN — KETOROLAC TROMETHAMINE 30 MG: 30 INJECTION, SOLUTION INTRAMUSCULAR at 04:07

## 2018-04-02 RX ADMIN — OXYCODONE HYDROCHLORIDE 5 MG: 5 TABLET ORAL at 08:56

## 2018-04-02 RX ADMIN — ACETAMINOPHEN 975 MG: 325 TABLET, FILM COATED ORAL at 15:21

## 2018-04-02 NOTE — ANESTHESIA PREPROCEDURE EVALUATION
Anesthesia Evaluation     . Pt has had prior anesthetic. Type: Regional    No history of anesthetic complications          ROS/MED HX    ENT/Pulmonary:       Neurologic:       Cardiovascular:         METS/Exercise Tolerance:     Hematologic:         Musculoskeletal:         GI/Hepatic:         Renal/Genitourinary:         Endo:         Psychiatric:         Infectious Disease:         Malignancy:         Other:                          neg OB ROS                 Anesthesia Plan      History & Physical Review  History and physical reviewed and following examination; no interval change.    ASA Status:  1 emergent.        Plan for Epidural          Postoperative Care      Consents  Anesthetic plan, risks, benefits and alternatives discussed with:  Patient..                          .

## 2018-04-02 NOTE — PLAN OF CARE
Mother and baby transferred to postpartum room at 2258 via bed and infant in [place] for post  section phase 2 recovery period. Patient oriented to room. Mother and baby bonding well and in stable condition upon transfer.

## 2018-04-02 NOTE — ANESTHESIA CARE TRANSFER NOTE
Patient: Anu Davis    Procedure(s):   - Wound Class: II-Clean Contaminated    Diagnosis: primary c/s  Diagnosis Additional Information: No value filed.    Anesthesia Type:   Epidural     Note:  Airway :Room Air  Patient transferred to:Phase II  Handoff Report: Identifed the Patient, Identified the Reponsible Provider, Reviewed the pertinent medical history, Discussed the surgical course, Reviewed Intra-OP anesthesia mangement and issues during anesthesia, Set expectations for post-procedure period and Allowed opportunity for questions and acknowledgement of understanding      Vitals: (Last set prior to Anesthesia Care Transfer)    CRNA VITALS  4/1/2018 2219 - 4/1/2018 2249      4/1/2018             Pulse: 77    SpO2: 98 %                Electronically Signed By: LIZBETH Chaves CRNA  April 1, 2018  10:49 PM

## 2018-04-02 NOTE — PLAN OF CARE
Problem: Patient Care Overview  Goal: Plan of Care/Patient Progress Review  Outcome: Improving  Patient breastfeeding frequently, tolerating well. Firm fundal checks, U-1. Managing pain with scheduled tylenol/toradol and PRN oxycodone 5mg at a time. Denying any nausea. Bed bath and hair shampooed. Bell in place, draining light yellow urine. Adequate oral intake. Skin to skin frequently encouraged, patient and spouse both involved in care of baby. VS stable on room air.

## 2018-04-02 NOTE — ANESTHESIA POSTPROCEDURE EVALUATION
Patient: Anu Davis    Procedure(s):   - Wound Class: II-Clean Contaminated    Diagnosis:primary c/s  Diagnosis Additional Information: No value filed.    Anesthesia Type:  Epidural    Note:  Anesthesia Post Evaluation    Patient location during evaluation: Bedside  Patient participation: Able to fully participate in evaluation  Level of consciousness: awake and alert  Pain management: adequate  Airway patency: patent  Cardiovascular status: acceptable  Respiratory status: acceptable  Hydration status: acceptable  PONV: none     Anesthetic complications: None          Last vitals:  Vitals:    04/01/18 1550 04/01/18 1645 04/01/18 2042   BP:  106/58    Pulse:      Resp:      Temp:  36.9  C (98.5  F) 36.4  C (97.6  F)   SpO2: 96%           Electronically Signed By: LIZBETH Chaves CRNA  April 1, 2018  10:56 PM

## 2018-04-02 NOTE — PLAN OF CARE
Problem: Patient Care Overview  Goal: Plan of Care/Patient Progress Review  Outcome: Improving  S:Delivery  B:Spontaneous Labor,40w3d    Lab Results   Component Value Date    GBS Negative 2018    with antibiotic treatment not indicated 4 hours prior to delivery.  A: Patient delivered Primary C/S for  arrest of descent at 2146 with Dr. ERUM Pelletier in attendance and baby placed on mother's abdomen for delayed cord clamping. Baby received from surgeon. Baby to warmer for assessment/resusitative efforts. and warmer for drying and wrapped in blanket.. Placed skin to skin @ 2156.. Apgars 9/9.  IV infusion of Oxytocin  infused. Placenta removal spontaneous. See Flowsheet for VS and PP checks. Labor care plan goals met, transition now to postpartum care.  R: Expect routine postpartum care. Anticipate first feeding within the hour or whenever infant displays feeding cues. Continue skin to skin. Prior discussion with mother indicates that feeding plan is Breast feeding . Educated mother on importance of exclusive breastfeeding, expected feeding readiness cues and encouraged her to observe for these cues while rooming in. Informed her that breastfeeding assistance would be provided.

## 2018-04-02 NOTE — L&D DELIVERY NOTE
"Delivery Summary    Anu Davis MRN# [id number]   Age: 27 year old YOB: 1990       27 year old  40w3d presented in active labor on 2018 AM hours  GBS negative status  Cx on admission 80/-1 at 0445,   AROM at 0726,   S/p epidural  Progressed to complete/+2 station at 1800,   Arrest of dilation with Cat 2 fetal heart tracing  Options reviewed and patient elected to proceed with primary  section    Uncomplicated PLTCS  Delivery of viable female infant at 214,   Refer to operative reports for further details  Weight 6#12oz  Apgar 9 and 9 at 1 and 5 minutes, respectively  Placenta delivered spontaneously, intact with 3 vessel cord  Normal appearing uterus, bilateral fallopian tubes and ovaries   mL   \"Christine May\"    Wang Pelletier MD  [facility]        Labor Event Times    Labor onset date:  18 Onset time:   4:45 AM   Dilation complete date:  18 Complete time:   5:30 PM   Start pushing date/time:  2018 1941            Labor Events     labor?:  No    steroids:  None   Labor Type:  Spontaneous   Predominate monitoring during 1st stage:  continuous electronic fetal monitoring      Antibiotics received during labor?:  No      Rupture date/time: 18 0800   Rupture type:  Artificial Rupture of Membranes   Fluid color:  Clear      1:1 continuous labor support provided by?:  RN          Delivery/Placenta Date and Time    Delivery Date:  18 Delivery Time:   9:46 PM      Vaginal Counts    Initial count performed by 2 team members:   Two Team Members   Hung Cantrell RN          Needles Suture [employer] Sponges Instruments   Initial counts 2  5    Added to count       Final counts          Placed during labor Accounted for at the end of labor               Apgars    Living status:  Living    1 Minute 5 Minute 10 Minute 15 Minute 20 Minute   Skin color: 1  1       Heart rate: 2  2     " "  Reflex irritability: 2  2       Muscle tone: 2  2       Respiratory effort: 2  2       Total: 9  9          Apgars assigned by:  COSTA PATEL RN      Cord    Vessels:  3 Vessels Complications:  Nuchal          Resuscitation    Methods:  None      Output in Delivery Room:  Voided       Measurements    Weight:  6 lb 12.3 oz Length:  1' 9\"   Head circumference:  34.3 cm       Skin to Skin and Feeding Plan    Skin to skin initiation date/time: 18   Skin to skin with:  Mother   Skin to skin end date/time:     How do you plan to feed your baby:  Breastfeeding      Labor Events and Shoulder Dystocia    Fetal Tracing Prior to Delivery:  Category 2            Delivery (Maternal) (Provider to Complete) ([id number])          Mother's Information  Mother: Aun Davis #[id number]    Start of Mother's Information     IO Blood Loss  18 0445 - 18 2254    Mom's I/O Activity            End of Mother's Information  Mother: Anu Davis #[id number]            Delivery - Provider to Complete ([id number])    Attempted Delivery Types (Choose all that apply):  Spontaneous Vaginal Delivery   Delivery Type (Choose the 1 that will go to the Birth History):  , Low Transverse                      Specifics:  Primary nulliparius   Indications for Primary:  Arrest of Descent         Placenta    Delayed Cord Clamping:  Done   Removal:  Spontaneous   Comments:  intact, 3 vessel cord   Disposition:  Hospital disposal      Anesthesia    Method:  Epidural   Cervical dilation at placement:  4-7         Presentation and Position    Presentation:  Vertex   Position:  Right Occiput Posterior                    Wang Pelletier MD  "

## 2018-04-02 NOTE — PROGRESS NOTES
Patient dangled at bedside, ambulated in the room with assist of 2, tolerated well. Plan to remove catheter this evening. Fundus U/1, firm, midline. PCD's on. Saline locked. Dressing is c/d/i, with minimal discomfort with movement. +2 edema in left ankle and foot. Elevated legs on pillow while in bed.  is attentive and involved in cares.

## 2018-04-02 NOTE — PROGRESS NOTES
S:Delivery  B:Spontaneous Labor,40w4d    Lab Results   Component Value Date    GBS Negative 2018    with antibiotic treatment not indicated 4 hours prior to delivery.  A: Patient delivered Primary C/S for  arrest of descent at 2030 with Dr. ERUM Pelletier in attendance . Baby received from surgeon. Baby to warmer for drying and wrapped in blanket.. Placed skin to skin @ 2300.. Apgars 9/9.  IV infusion of Oxytocin  infused. Placenta removal manual. See Flowsheet for VS and PP checks. Labor care plan goals met, transition now to postpartum care.  R: Expect routine postpartum care. Anticipate first feeding within the hour or whenever infant displays feeding cues. Continue skin to skin. Prior discussion with mother indicates that feeding plan is Breast feeding . Educated mother on importance of exclusive breastfeeding, expected feeding readiness cues and encouraged her to observe for these cues while rooming in. Informed her that breastfeeding assistance would be provided.

## 2018-04-02 NOTE — PROGRESS NOTES
Intrapartum Note    Informed by RNs that patient cervical dilation is complete/+2 station as of 1800.   Fetal status is Category 2 remarkable for fetal tachycardia at 170-180s, moderate variability, no accels and occasional spontaneous and late decelerations to the 120-130s. Fetal status has been this way since 1840.   Patient has been pushing since 1900.     On arrival, seen and evaluated patient.   AF, VSS. No maternal tachycardia.   Cervix assess and present with patient during several pushing efforts.   Fetal station unable to get passed +3 station and with concurrent Category 2 tracing as stated above, discussed with patient options including operative delivery via vacuum extraction vs primary  section. Reviewed respective risks with each option.     Patient elects to proceed with primary  section. Risks reviewed and include but not limited to bleeding, need for blood transfusion, infection, injury to surrounding organs (ie bowel/intestines, bladder, ureters, major blood vessels and nerves). If any of these organs are injured, then we identify it and try to fix it. If we cant fix it ourselves, then we consult surgeons that specialize in those areas that are damaged and they fix it for us. Unintended injuries can go unnoticed at the time of surgery as well, and present with complications days to weeks later, which may require additional surgeries to repair. Also discussed fetal injury and  hysterectomy for life threatening blood loss. Patient conveys understanding of these risks and agrees to proceed. She signed the consent form.     Wang Pelletier MD  [facility]

## 2018-04-02 NOTE — ANESTHESIA POSTPROCEDURE EVALUATION
Patient: Anu Davis    * No procedures listed *    Diagnosis:* No pre-op diagnosis entered *  Diagnosis Additional Information: No value filed.    Anesthesia Type:  Epidural    Note:  Anesthesia Post Evaluation    Patient location during evaluation: Bedside  Patient participation: Able to fully participate in evaluation  Pain management: adequate  Airway patency: patent  Cardiovascular status: acceptable  Respiratory status: acceptable  Hydration status: acceptable  PONV: none     Anesthetic complications: None          Last vitals:  Vitals:    04/01/18 1550 04/01/18 1645 04/01/18 2042   BP:  106/58    Pulse:      Resp:      Temp:  36.9  C (98.5  F) 36.4  C (97.6  F)   SpO2: 96%           Electronically Signed By: LIZBETH Chaves CRNA  April 1, 2018  10:59 PM

## 2018-04-02 NOTE — OP NOTE
[facility]  Full Operative Note    Patient Name:Anu Davis  MRN: [id number]  YOB: 1990  Surgery Date: 2018    Surgeon: Wang Pelletier MD  Assistant:  Giuliana Collins, LIZBETH MAYFIELD and Darci Vergara PA-C who were both instrumental in retraction, application of fundal pressure to facilitate delivery of infant and maintaining hemostasis     Pre-operative Diagnosis: 1) Intrauterine pregnancy at 40 weeks and 3 days 2) Arrest of Descent   Post-operative Diagnosis: Viable female infant, delivered  Procedure: Primary lower transverse  section via Pfannenstiel skin incision with two layer uterine closure    Anesthesia: Epidural  FRA Score: 2    EBL: 600 mL   IV fluids: 750 mL crystalloids  Urine Output: 200 mL of tayler colored urine at the end of the procedure    Complications: None  Specimen: None  Drains: Bell catheter    Findings: Viable female infant delivered in cephalic presentation, occiput posterior asynclitic position at 2146 on 2018. Nuchal cord tight x 1. Clear amniotic fluid. Weight 6 lbs 12 oz. Apgar 9 and 9 at 1 and 5 minutes, respectively. Normal appearing uterus, bilateral fallopian tubes and ovaries. Bilateral lower uterine extensions that were repaired without difficulty. Flow seal applied to the apex of the lower uterine extensions to reinforce hemostasis. Sepra film applied to hysterotomy and upper uterine segment to minimize adhesions.     Indication: Ms. Anu Davis 27 year old  40w3d presented in active labor on 2018. She progressed to complete dilation/+2 station at 1800. She was unable to push baby past +3 station. Fetal status was also remarkable for Category 2 tracing consisting of fetal tachycardia in the 170-180s with moderate variability with occasional spontaneous and late decelerations shea to the 120-130s. Options reviewed for either operative delivery vs primary  section. Patient elected to proceed  with primary  section. Risks reviewed and include but not limited to bleeding, need for blood transfusion, infection, injury to surrounding organs (ie bowel/intestines, bladder, ureters, major blood vessels and nerves). If any of these organs are injured, then we identify it and try to fix it. If we cant fix it ourselves, then we consult surgeons that specialize in those areas that are damaged and they fix it for us. Unintended injuries can go unnoticed at the time of surgery as well, and present with complications days to weeks later, which may require additional surgeries to repair. Also discussed fetal injury and  hysterectomy for life threatening blood loss. Patient conveys understanding of these risks and agrees to proceed. She signed the consent form.     Technique: After signing the consent form, the patient was taken to the operating room where her epidural anesthesia was rebolused and found to be adequate. She was then positioned in the supine position with a leftward tilt. She was then prepped and draped in the usual sterile fashion.  A formal TIME-OUT was conducted with correct identification of the patient and procedure being performed. The site of incision was tested for adequate anesthesia before incision was made and verified with the anesthesia staff and members. A Pfannenstiel skin incision was made with the scalpel and carried down to the underlying fascia with the scalpel. The fascia was incised at the midline and extended laterally with the Krishna scissors. Kocher clamps were applied to the superior and inferior aspect of the fascia, lifted and tented up so as to bluntly and sharply dissect the fascia from the underlying rectus muscles. The rectus muscles were the  at the midline in a sharp fashion. The peritoneum was identified and entered bluntly. The peritoneum was extended superiorly and inferiorly by stretching and blunt dissection with careful attention to avoid  injury to the bowel and bladder. Once adequate extension of the peritoneum was achieved to allow for eventual delivery of the baby, the Med 0 retractor was inserted into the peritoneum. A bladder flap was created using Metzenbaum scissors.       A lower uterine segment incision was made with the scalpel and extended laterally in a digital blunt fashion. The head was grasped, elevated and delivered along with the rest of the infant through the hysterotomy without difficulty. The cord was clamped, cut and handed off to the waiting nursery team. Cord gases were collected and sent.       The placenta was delivered spontaneously using gentle traction of the cord. The uterus was exteriorized and cleared of all clots and debris. Gentle massage was employed to achieve firmness to the uterus along with the instillation of uterotonics in the way of IV Pitocin. The hysterotomy incision was repaired with 0 Vicryl x 2 in a running locked fashion anchored at the the hysterotomy angles and tied in the middle aspect of the hysterotomy. The repair of the bilateral lower uterine extensions were incorporated with the two 0 Vicryl sutures used to repair the hysterotomy. Bleeding at the left angle of the hysterotomy incision was repaired with 0 Vicryl in a figure of eight fashion. An imbricating layer using 0 Monocryl suture in a non-locking fashion was made.  The repaired hysterotomy incision was inspected for hemostasis and achieved. Flow seal was applied at the bilateral angles for further hemostatic re-enforcement.       The posterior cul-de-sac was irrigated and suctioned with warm saline and the uterus returned to the abdomen. The paracolic gutters were irrigated and suctioned with warm saline. The repaired hysterotomy inspected for adequate hemostasis. Sepra film was applied to the hysterotomy incision and the upper uterine segment.  The Med 0 retraction was removed from the peritoneum      The peritoneum was  re-approximated using 2-0 Vicryl in a running fashion. The rectus muscles were inspected for adequate hemostasis and not re-approximated. The fascia layer was reapproximated using 0 Vicryl in a running fashion. The subcutaneous layer was irrigated, inspected for adequate hemostasis and re-approximated using 3-0 plain gut in interrupted fashion. The skin was closed with subcuticular fashion using 4-0 Vicryl.       Sponge, laps, needle counts were correct times 2. The patient tolerated the procedure well and was taken to the PACU in stable condition. Two grams of IV ancef were given prior to skin incision.     Wang Pelletier MD  [facility]

## 2018-04-02 NOTE — PROGRESS NOTES
PPD#1  Doing well. Pain well controlled on IV pain medication. Tolerating regular diet. Bell catheter still in place. Has yet to ambulate.. Lochia is scant. Breast feeding.     Vitals:    04/02/18 0059 04/02/18 0200 04/02/18 0300 04/02/18 0400   BP: 108/67 119/74 108/67 113/66   Pulse:       Resp: 18 16 16 16   Temp:       TempSrc:       SpO2: 96% 96% 98% 95%     General Appearance: NAD  Abdomen: Soft, NT, ND. Fundus firm at U-2  Incision: Bandaged, appears dry.   Extremities: NT, trace edema    Hemoglobin   Date Value Ref Range Status   08/07/2017 12.7 11.7 - 15.7 g/dL Final   ]    A/P: 27 year old  PPD#1 s/p PLTCS for arrest of descent.  Hemodynamically stable.   -- routine post-op/postpartum cares  -- encouraged to ambulate  -- remove Bell catheter later during the day  -- anticipate discharge home on PPD#3 when meeting discharge goals    Wang Pelletier MD  [facility]

## 2018-04-02 NOTE — PROVIDER NOTIFICATION
04/01/18 1945   Provider Notification   Provider Name/Title PNP and MD    Method of Notification Phone   Request Evaluate - Remote   Notification Reason Fetal Baseline Change   MD and PNP updated with Fetal heart rate baseline 190's for appx 45 minutes.  Afebrile, pt pushing.

## 2018-04-03 PROCEDURE — 12000027 ZZH R&B OB

## 2018-04-03 PROCEDURE — 25000132 ZZH RX MED GY IP 250 OP 250 PS 637: Performed by: OBSTETRICS & GYNECOLOGY

## 2018-04-03 RX ADMIN — OXYCODONE HYDROCHLORIDE 5 MG: 5 TABLET ORAL at 04:11

## 2018-04-03 RX ADMIN — OXYCODONE HYDROCHLORIDE 5 MG: 5 TABLET ORAL at 08:01

## 2018-04-03 RX ADMIN — OXYCODONE HYDROCHLORIDE 5 MG: 5 TABLET ORAL at 18:34

## 2018-04-03 RX ADMIN — OXYCODONE HYDROCHLORIDE 5 MG: 5 TABLET ORAL at 21:34

## 2018-04-03 RX ADMIN — OXYCODONE HYDROCHLORIDE 5 MG: 5 TABLET ORAL at 15:28

## 2018-04-03 RX ADMIN — Medication 2 G: at 06:03

## 2018-04-03 RX ADMIN — OXYCODONE HYDROCHLORIDE 5 MG: 5 TABLET ORAL at 00:50

## 2018-04-03 RX ADMIN — ACETAMINOPHEN 975 MG: 325 TABLET, FILM COATED ORAL at 23:46

## 2018-04-03 RX ADMIN — OXYCODONE HYDROCHLORIDE 5 MG: 5 TABLET ORAL at 11:50

## 2018-04-03 RX ADMIN — OXYCODONE HYDROCHLORIDE 5 MG: 5 TABLET ORAL at 23:46

## 2018-04-03 RX ADMIN — IBUPROFEN 800 MG: 800 TABLET ORAL at 04:11

## 2018-04-03 RX ADMIN — IBUPROFEN 600 MG: 600 TABLET ORAL at 17:16

## 2018-04-03 RX ADMIN — PRENATAL VIT W/ FE FUMARATE-FA TAB 27-0.8 MG 1 TABLET: 27-0.8 TAB at 08:02

## 2018-04-03 RX ADMIN — IBUPROFEN 800 MG: 800 TABLET ORAL at 23:46

## 2018-04-03 RX ADMIN — SENNOSIDES AND DOCUSATE SODIUM 1 TABLET: 8.6; 5 TABLET ORAL at 08:02

## 2018-04-03 RX ADMIN — SIMETHICONE CHEW TAB 80 MG 80 MG: 80 TABLET ORAL at 15:28

## 2018-04-03 RX ADMIN — IBUPROFEN 600 MG: 600 TABLET ORAL at 09:56

## 2018-04-03 RX ADMIN — ACETAMINOPHEN 975 MG: 325 TABLET, FILM COATED ORAL at 08:01

## 2018-04-03 RX ADMIN — SIMETHICONE CHEW TAB 80 MG 80 MG: 80 TABLET ORAL at 21:34

## 2018-04-03 RX ADMIN — ACETAMINOPHEN 975 MG: 325 TABLET, FILM COATED ORAL at 15:28

## 2018-04-03 RX ADMIN — ACETAMINOPHEN 975 MG: 325 TABLET, FILM COATED ORAL at 00:22

## 2018-04-03 NOTE — PLAN OF CARE
Problem: Postpartum ( Delivery) (Adult,Obstetrics,Pediatric)  Goal: Signs and Symptoms of Listed Potential Problems Will be Absent, Minimized or Managed (Postpartum)  Signs and symptoms of listed potential problems will be absent, minimized or managed by discharge/transition of care (reference Postpartum ( Delivery) (Adult,Obstetrics,Pediatric) CPG).   Assumed care of patient at 1915 after bedside report completed. Patient and  had family visiting. Patient stated her pain was well managed and had no needs at this time. Call light with in reach.

## 2018-04-03 NOTE — PLAN OF CARE
Problem: Patient Care Overview  Goal: Plan of Care/Patient Progress Review  Outcome: Improving  Patient reporting minimal bleeding, but one clot smaller than a golf ball. Patient flushed prior to telling writer about the clot. Breastfeeding on demand, every 1-2 hours. Some tenderness with breastfeeding, but using hydrogel and lanolin in between feedings. Last Oxycodone administered at 1830. Up independently in her room, steady on her feed. Fundus U/1, firm, midline.  attentive and involved in cares. Adequate oral intake, vs stable.

## 2018-04-03 NOTE — PROGRESS NOTES
PPD#2  Doing well. Pain well controlled on oralpain medication. Tolerating regular diet. Voiding without difficulty. Ambulating without difficulty. Passing flatus.  Lochia is scant. Breast feeding.     Vitals:    04/03/18 0000 04/03/18 0135 04/03/18 0758 04/03/18 0808   BP: 106/57  99/71 111/76   BP Location:   Right arm Left arm   Pulse:       Resp: 18 18 16    Temp: 98.5  F (36.9  C)  98.1  F (36.7  C)    TempSrc: Oral  Oral    SpO2: 96%  96%      General Appearance: NAD  Abdomen: Soft, NT, ND. Fundus firm at U-2  Incision: Bandaged, appears dry.   Extremities: NT, trace edema    Hemoglobin   Date Value Ref Range Status   04/02/2018 9.0 (L) 11.7 - 15.7 g/dL Final   08/07/2017 12.7 11.7 - 15.7 g/dL Final   ]    A/P: 27 year old  PPD#2 s/p PLTCS for arrest of descent.  Hemodynamically stable.   -- routine post-op/postpartum cares  -- encouraged to ambulate  -- anticipate discharge home on PPD#3 when meeting discharge goals    Wang Pelletier MD  [facility]

## 2018-04-03 NOTE — PLAN OF CARE
Problem: Patient Care Overview  Goal: Plan of Care/Patient Progress Review  Outcome: Improving  Patient denying any pain but requested PRN oxycodone to prevent future pain when ambulating/moving later today. Encouraged to shower this afternoon, plan to leave dressing over incision until after shower. Dressing currently is c/d/i. Encouraged ice pack to incision. Breastfeeding is going well - baby latching on with minimal discomfort. Patient using lanolin in between feedings. Voiding without difficulty. Verbalized her plan to discharge tomorrow. VS stable.

## 2018-04-03 NOTE — PLAN OF CARE
Problem: Patient Care Overview  Goal: Plan of Care/Patient Progress Review  Outcome: Improving  Patient progressing well. Has dangled, stood and took steps in room last evening.  Pad changed, rubra small amount. Bell catheter removed. Pneumo devices removed from legs.  Patient instructed to call for help getting up to bathroom to void.  Incision dressing clean dry and intact with no redness or drainage noted on dressing. Holding infant and Dad assisting with baby cares. Bonding well with infant. Breast feeding is going well, baby is latching well and feeding for good lengths of time.  Patient rates Incisional pain comfortably manageable.   Taking Scheduled tylenol Ibuprofen and oxycodone when due with good relief.  Made plan with patient to bring pain medication when due. Patient encouraged to report increased bleeding or clots.  Encourage to void frequently and keep bladder empty to prevent bleeding and incisional pain.  Patient verbalized understanding and agreed.      Problem: Postpartum ( Delivery) (Adult,Obstetrics,Pediatric)  Goal: Signs and Symptoms of Listed Potential Problems Will be Absent, Minimized or Managed (Postpartum)  Signs and symptoms of listed potential problems will be absent, minimized or managed by discharge/transition of care (reference Postpartum ( Delivery) (Adult,Obstetrics,Pediatric) CPG).   Outcome: Improving  Patient has good pain control with oral pain medication.  Bell catheter removed. 625 ml clear yellow urine emptied.  Securing device removed from leg.  Patient is alert and oriented and uses call light appropriately.  Makes needs known.  Spouse and father of infant is rooming in and is helpful and supportive.

## 2018-04-04 VITALS
RESPIRATION RATE: 18 BRPM | HEART RATE: 88 BPM | TEMPERATURE: 96.4 F | OXYGEN SATURATION: 98 % | DIASTOLIC BLOOD PRESSURE: 56 MMHG | SYSTOLIC BLOOD PRESSURE: 101 MMHG

## 2018-04-04 PROCEDURE — 25000132 ZZH RX MED GY IP 250 OP 250 PS 637: Performed by: OBSTETRICS & GYNECOLOGY

## 2018-04-04 RX ORDER — SIMETHICONE 80 MG
80 TABLET,CHEWABLE ORAL 4 TIMES DAILY PRN
Qty: 180 TABLET | Refills: 0 | Status: SHIPPED | OUTPATIENT
Start: 2018-04-04 | End: 2018-05-15

## 2018-04-04 RX ORDER — ACETAMINOPHEN 325 MG/1
975 TABLET ORAL EVERY 8 HOURS
Qty: 100 TABLET | Refills: 0 | Status: SHIPPED | OUTPATIENT
Start: 2018-04-04 | End: 2018-05-15

## 2018-04-04 RX ORDER — OXYCODONE HYDROCHLORIDE 5 MG/1
5-10 TABLET ORAL
Qty: 20 TABLET | Refills: 0 | Status: SHIPPED | OUTPATIENT
Start: 2018-04-04 | End: 2018-05-15

## 2018-04-04 RX ORDER — IBUPROFEN 800 MG/1
800 TABLET, FILM COATED ORAL EVERY 6 HOURS PRN
Qty: 90 TABLET | Refills: 0 | Status: SHIPPED | OUTPATIENT
Start: 2018-04-04 | End: 2018-05-15

## 2018-04-04 RX ORDER — AMOXICILLIN 250 MG
2 CAPSULE ORAL 2 TIMES DAILY PRN
Qty: 100 TABLET | Refills: 0 | Status: SHIPPED | OUTPATIENT
Start: 2018-04-04 | End: 2018-05-15

## 2018-04-04 RX ADMIN — IBUPROFEN 800 MG: 800 TABLET ORAL at 10:39

## 2018-04-04 RX ADMIN — PRENATAL VIT W/ FE FUMARATE-FA TAB 27-0.8 MG 1 TABLET: 27-0.8 TAB at 08:48

## 2018-04-04 RX ADMIN — ACETAMINOPHEN 975 MG: 325 TABLET, FILM COATED ORAL at 08:48

## 2018-04-04 RX ADMIN — OXYCODONE HYDROCHLORIDE 5 MG: 5 TABLET ORAL at 03:58

## 2018-04-04 RX ADMIN — SENNOSIDES AND DOCUSATE SODIUM 2 TABLET: 8.6; 5 TABLET ORAL at 08:48

## 2018-04-04 RX ADMIN — OXYCODONE HYDROCHLORIDE 5 MG: 5 TABLET ORAL at 13:09

## 2018-04-04 NOTE — DISCHARGE SUMMARY
[facility] Discharge Summary    Anu Davis MRN# [id number]   Age: 27 year old YOB: 1990     Date of Admission:  2018  Date of Discharge::  2018  Admitting Physician:  Wang Pelletier MD  Discharge Physician:  Josi Garcia MD     Home clinic: [facility]          Admission Diagnoses:   Pregnancy  primary c/s  S/P           Discharge Diagnosis:   Intrauterine pregnancy at 40w3d   weeks gestation  Primary  section          Procedures:   Procedure(s): Primary low transverse  section       No other procedures performed during this admission           Medications Prior to Admission:     Prescriptions Prior to Admission   Medication Sig Dispense Refill Last Dose     Prenatal Vit-Fe Fumarate-FA (PRENATAL MULTIVITAMIN PLUS IRON) 27-0.8 MG TABS per tablet Take 1 tablet by mouth daily   3/31/2018 at pm             Discharge Medications:     Current Discharge Medication List      START taking these medications    Details   oxyCODONE IR (ROXICODONE) 5 MG tablet Take 1-2 tablets (5-10 mg) by mouth every 3 hours as needed for other (pain control or improvement in physical function. Hold dose for analgesic side effects.)  Qty: 20 tablet, Refills: 0    Associated Diagnoses: S/P       acetaminophen (TYLENOL) 325 MG tablet Take 3 tablets (975 mg) by mouth every 8 hours  Qty: 100 tablet, Refills: 0    Associated Diagnoses: S/P       ibuprofen (ADVIL/MOTRIN) 800 MG tablet Take 1 tablet (800 mg) by mouth every 6 hours as needed for other (cramping)  Qty: 90 tablet, Refills: 0    Associated Diagnoses: S/P       senna-docusate (SENOKOT-S;PERICOLACE) 8.6-50 MG per tablet Take 2 tablets by mouth 2 times daily as needed for constipation  Qty: 100 tablet, Refills: 0    Associated Diagnoses: S/P       simethicone (MYLICON) 80 MG chewable tablet Take 1 tablet (80 mg) by mouth 4 times daily as needed for other  "(gas)  Qty: 180 tablet, Refills: 0    Associated Diagnoses: S/P          CONTINUE these medications which have NOT CHANGED    Details   Prenatal Vit-Fe Fumarate-FA (PRENATAL MULTIVITAMIN PLUS IRON) 27-0.8 MG TABS per tablet Take 1 tablet by mouth daily                   Consultations:   No consultations were requested during this admission          Brief History of Labor or Admission:   27 year old  40w3d presented in active labor on 2018 AM hours  GBS negative status  Cx on admission /-1 at 0445,   AROM at 0726,   S/p epidural  Progressed to complete/+2 station at 1800,   Arrest of dilation with Cat 2 fetal heart tracing  Options reviewed and patient elected to proceed with primary  section    Uncomplicated PLTCS  Delivery of viable female infant at 2146,   Refer to operative reports for further details  Weight 6#12oz  Apgar 9 and 9 at 1 and 5 minutes, respectively  Placenta delivered spontaneously, intact with 3 vessel cord  Normal appearing uterus, bilateral fallopian tubes and ovaries   mL   \"Christine May\"    Wang Pelletier MD  [facility]               Hospital Course:   The patient's hospital course was unremarkable.  She recovered as anticipated and experienced no post-operative complications.  On discharge, her pain was well controlled. Vaginal bleeding is similar to peak menstrual flow.  Voiding without difficulty.  Ambulating well and tolerating a normal diet.  No fever or significant wound drainage.  Breastfeeding well.  Infant is stable.  No bowel movement yet but passing flatus.  She was discharged on post-partum day #3.  /56  Pulse 88  Temp 96.4  F (35.8  C) (Axillary)  Resp 18  LMP 2017  SpO2 98%  Breastfeeding? Unknown  Alert and orientedx3, in NAD  Lungs-CTA bl  CV-RRRnml S1 and S2  Abdomen-soft,nontender, NABS, fundus U - 3cm/firm/appropriately TTP  Incision clean/dry/intact with sutures and secure " seal  Ext-trace edema, neg cords/homans  Post-partum hemoglobin:   Hemoglobin   Date Value Ref Range Status   04/02/2018 9.0 (L) 11.7 - 15.7 g/dL Final             Discharge Instructions and Follow-Up:   Discharge diet: Regular   Discharge activity: No lifting, driving, or strenuous exercise for 6 week(s)  No driving or operating machinery while on narcotic analgesics  Pelvic rest: abstain from intercourse and do not use tampons for 6 week(s)   Discharge follow-up: Follow up with Dr. KLEIN in 6 weeks   Wound care: Ice to area for comfort  Keep wound clean and dry           Discharge Disposition:   Discharged to home      Attestation:  I have reviewed today's vital signs, notes, medications, labs and imaging.  Amount of time performed on this discharge summary: 20 minutes.    Josi Garcia MD

## 2018-04-04 NOTE — PLAN OF CARE
Problem: Postpartum ( Delivery) (Adult,Obstetrics,Pediatric)  Goal: Signs and Symptoms of Listed Potential Problems Will be Absent, Minimized or Managed (Postpartum)  Signs and symptoms of listed potential problems will be absent, minimized or managed by discharge/transition of care (reference Postpartum ( Delivery) (Adult,Obstetrics,Pediatric) CPG).   Data: Vital signs within normal limits. Postpartum checks within normal limits - see flow record. Patient eating and drinking normally. Patient able to empty bladder independently. . Patient ambulating independently..   No apparent signs of infection. Incision healing well. Patient Is performing self cares and Is able to care for infant. Positive attachment behaviors are observed with infant. Support persons are present.  Action:  Pain plan was discussed. Patient would like pain meds to be brought in when they are due. Patient was medicated during the shift for pain. See MAR.Patient education done about  cares. See flow record.  Response:   Patient reassessed within 1 hour after each medication for pain. Patient stated that pain had improved. Patient stated that she was comfortable. .   Plan: Anticipate discharge on today 18.

## 2018-04-04 NOTE — PLAN OF CARE
Problem: Postpartum ( Delivery) (Adult,Obstetrics,Pediatric)  Goal: Signs and Symptoms of Listed Potential Problems Will be Absent, Minimized or Managed (Postpartum)  Signs and symptoms of listed potential problems will be absent, minimized or managed by discharge/transition of care (reference Postpartum ( Delivery) (Adult,Obstetrics,Pediatric) CPG).   Outcome: Adequate for Discharge Date Met: 18  Teaching reviewed.  Pt discharged to home with . Discharge instructions given reviewed and signed.  Pt verbalized understanding.  Appropriate questions asked.  Id's verified with 's.  Pt was able to wheel chaired to the outpt pharmacy where she picked up her medications.  to the door accompanied by staff.  Then ambulated tot the door accompanied by this RN.

## 2018-04-04 NOTE — DISCHARGE INSTRUCTIONS
Discharge Instructions and Follow-Up:   Discharge diet: Regular   Discharge activity: No lifting, driving, or strenuous exercise for 6 week(s)  No driving or operating machinery while on narcotic analgesics  Pelvic rest: abstain from intercourse and do not use tampons for 6 week(s)   Discharge follow-up: Follow up with Dr. KLEIN in 6 weeks   Wound care: Ice to area for comfort  Keep wound clean and dry         Postop  Birth Instructions    Activity       Do not lift more than 10 pounds for 6 weeks after surgery.  Ask family and friends for help when you need it.    No driving until you have stopped taking your pain medications (usually two weeks after surgery).    No heavy exercise or activity for 6 weeks.  Don't do anything that will put a strain on your surgery site.    Don't strain when using the toilet.  Your care team may prescribe a stool softener if you have problems with your bowel movements.     To care for your incision:       Keep the incision clean and dry.    Do not soak your incision in water. No swimming or hot tubs until it has fully healed. You may soak in the bathtub if the water level is below your incision.    Do not use peroxide, gel, cream, lotion, or ointment on your incision.    Adjust your clothes to avoid pressure on your surgery site (check the elastic in your underwear for example).     You may see a small amount of clear or pink drainage and this is normal.  Check with your health care provider:       If the drainage increases or has an odor.    If the incision reddens, you have swelling, or develop a rash.    If you have increased pain and the medicine we prescribed doesn't help.    If you have a fever above 100.4 F (38 C) with or without chills when placing thermometer under your tongue.   The area around your incision (surgery wound), will feel numb.  This is normal. The numbness should go away in less than a year.     Keep your hands clean:  Always wash your hands before  touching your incision (surgery wound). This helps reduce your risk of infection. If your hands aren't dirty, you may use an alcohol hand-rub to clean your hands. Keep your nails clean and short.    Call your healthcare provider if you have any of these symptoms:       You soak a sanitary pad with blood within 1 hour, or you see blood clots larger than a golf ball.    Bleeding that lasts more than 6 weeks.    Vaginal discharge that smells bad.    Severe pain, cramping or tenderness in your lower belly area.    A need to urinate more frequently (use the toilet more often), more urgently (use the toilet very quickly), or it burns when you urinate.    Nausea and vomiting.    Redness, swelling or pain around a vein in your leg.    Problems breastfeeding or a red or painful area on your breast.    Chest pain and cough or are gasping for air.    Problems with coping with sadness, anxiety or depression. If you have concerns about hurting yourself or the baby, call your provider immediately.      You have questions or concerns after you return home.     For problems or questions with breastfeeding after discharge call: [phone] at the [facility] clinic.  After hours you may leave a message and your call will returned the next morning. You may also call the Birth[facility] to answer questions, [phone].    Is pregnancy or parenting NOT what you expected?  Are you sad, having difficulty sleeping, feeling overwhelmed, anxious or having troubling thoughts?  Call [facility] Help [facility]-Pregnancy & Postpartum Support [place] @ [phone]([phone]).  Or see online resources list:  Www.ppsupport[place].org    If you have concerns/questions after returning home, contact the nurse triage line [phone] or your primary care clinic.    Call for follow up appointment to be seen by OB/Gyn in 6 weeks.  [phone]

## 2018-05-15 ENCOUNTER — PRENATAL OFFICE VISIT (OUTPATIENT)
Dept: OBGYN | Facility: CLINIC | Age: 28
End: 2018-05-15
Payer: COMMERCIAL

## 2018-05-15 VITALS
DIASTOLIC BLOOD PRESSURE: 67 MMHG | HEART RATE: 79 BPM | SYSTOLIC BLOOD PRESSURE: 105 MMHG | TEMPERATURE: 97.6 F | BODY MASS INDEX: 28.84 KG/M2 | WEIGHT: 138 LBS

## 2018-05-15 PROCEDURE — 99207 ZZC POST PARTUM EXAM: CPT | Performed by: OBSTETRICS & GYNECOLOGY

## 2018-05-15 NOTE — MR AVS SNAPSHOT
"              After Visit Summary   5/15/2018    Anu Davis    MRN: [id number]           Patient Information     Date Of Birth          1990        Visit Information        Provider Department      5/15/2018 9:00 AM Wang Pelletier MD [facility]        Today's Diagnoses     Routine postpartum follow-up    -  1       Follow-ups after your visit        Who to contact     If you have questions or need follow up information about today's clinic visit or your schedule please contact [facility] directly at [phone].  Normal or non-critical lab and imaging results will be communicated to you by [employer], letter or phone within 4 business days after the clinic has received the results. If you do not hear from us within 7 days, please contact the clinic through [employer] or phone. If you have a critical or abnormal lab result, we will notify you by phone as soon as possible.  Submit refill requests through [employer] or call your pharmacy and they will forward the refill request to us. Please allow 3 business days for your refill to be completed.          Additional Information About Your Visit        MyChart Information     [employer] lets you send messages to your doctor, view your test results, renew your prescriptions, schedule appointments and more. To sign up, go to [email or web address] . Click on \"Log in\" on the left side of the screen, which will take you to the Welcome page. Then click on \"Sign up Now\" on the right side of the page.     You will be asked to enter the access code listed below, as well as some personal information. Please follow the directions to create your username and password.     Your access code is: [email or web address]  Expires: 2018  9:19 AM     Your access code will  in 90 days. If you need help or a new code, please call your [facility]] or [phone].        Care EveryWhere ID     This is your Care EveryWhere ID. This could be used by " other organizations to access your [place] medical records  [id number]        Your Vitals Were     Pulse Temperature Last Period Breastfeeding? BMI (Body Mass Index)       79 97.6  F (36.4  C) (Tympanic) 06/22/2017 No 28.84 kg/m2        Blood Pressure from Last 3 Encounters:   05/15/18 105/67   04/04/18 101/56   03/30/18 119/59    Weight from Last 3 Encounters:   05/15/18 138 lb (62.6 kg)   03/30/18 160 lb 6.4 oz (72.8 kg)   03/30/18 160 lb 12.8 oz (72.9 kg)              Today, you had the following     No orders found for display       Primary Care Provider Fax #    Physician No Ref-Primary [phone]       No address on file        Equal Access to Services     SOLEDAD MUNSON : Ronald Hurd, uriah vega, zachery kaalmajorge plasencia, lucinda sharma . So [facility] [phone].    ATENCIÓN: Si habla español, tiene a mosquera disposición servicios gratuitos de asistencia lingüística. Llame al [phone].    We comply with applicable federal civil rights laws and [place] laws. We do not discriminate on the basis of race, color, national origin, age, disability, sex, sexual orientation, or gender identity.            Thank you!     Thank you for choosing [facility]  for your care. Our goal is always to provide you with excellent care. Hearing back from our patients is one way we can continue to improve our services. Please take a few minutes to complete the written survey that you may receive in the mail after your visit with us. Thank you!             Your Updated Medication List - Protect others around you: Learn how to safely use, store and throw away your medicines at www.disposemymeds.org.      Notice  As of 5/15/2018  9:19 AM    You have not been prescribed any medications.

## 2018-05-15 NOTE — PROGRESS NOTES
"6 week Postpartum Visit Note    S:  Anu Davis is here for her 6-week postpartum checkup. No complaints.     Delivery Date: 4/1/2018.    Delivering provider:  Wang Pelletier MD.    Type of delivery:  PLTCS for arrest of descent and Cat 2 fetal heart tracing.     Infant gender:  Girl \"Christine\", weight 6 pounds 12 oz.  Feeding Method:  Bottlefed.  Complications reported with feeding:  none, infant thriving .    Bleeding:  None currently.  Duration:  3 weeks after delivery.  Menses resumed:  Yes   Bowel/Urinary problems:  No    PHQ-9 score: 1      Contraception Planned:  condoms  She  has not had intercourse since delivery..    Current tobacco use:  No  Hx of Abuse:  No  ================================================================  ROS: 10 point ROS neg other than the symptoms noted above in the HPI.     O:  EXAM:  /67 (BP Location: Right arm, Patient Position: Chair, Cuff Size: Adult Large)  Pulse 79  Temp 97.6  F (36.4  C) (Tympanic)  Wt 138 lb (62.6 kg)  LMP 06/22/2017  Breastfeeding? No  BMI 28.84 kg/m2    General: healthy, alert and no distress  Psych: negative for sleep disturbance, anxiety, nervous breakdown, depression, thoughts of self-harm, thoughts of hurting someone else, agitation and hallucinations  Breasts:  Lactating, Nipples intact with no lesions, Non-tender and No S/S of yeast or mastitis  Abdomen: Soft, flat, non-tender  Incision:  well healed   Vulva:  Normal genitalia and Bartholin's, Urethra, [place]'s normal  Vagina:  normal with good muscle tone, without discharge and rugated  Cervix:  Nulliparous,, no lesions and pink, moist, closed, without lesion or CMT.    Uterus:  fully involuted and non-tender and anteflexed, small, smooth, firm, mobile w/o pain    Adnexa:  No masses, nodularity, tenderness  Recto-vaginal:   anus normal    A:   27 year old  s/p PLTCS here for 6 weeks postpartum visit. Doing well    P:  Contraception: Birth control counseling provided " with options reviewed including OCPs, nuvaring, birth control patch, depo provera, nexplanon, IUD options with associated risks and benefits. Patient elected to stay with condoms  Feeding: Bottle  Return for annual exam or PRN    Wang Pelletier MD  [facility]

## 2018-05-16 ASSESSMENT — PATIENT HEALTH QUESTIONNAIRE - PHQ9: SUM OF ALL RESPONSES TO PHQ QUESTIONS 1-9: 1

## 2018-06-07 NOTE — PROGRESS NOTES
DOing well.  +FM, +BH ctx, no VB or LOF.    27 year old  at 37w1d   - has # for BC, reviewed s/s of labor    RTC weekly until delivery    Miguelina Irwin MD, MPH  [facility]       Varicose veins

## 2018-06-22 ENCOUNTER — NURSE TRIAGE (OUTPATIENT)
Dept: NURSING | Facility: CLINIC | Age: 28
End: 2018-06-22

## 2018-06-22 NOTE — TELEPHONE ENCOUNTER
Patient reporting she had a baby by  3 1/2 months ago. Patient reporting ongoing irregular vaginal bleeding since.  Patient reporting vaginal bleeding every 14 days x 3 months. Bleeding is similar to menses, lasting 5 days. Soaking pad or tampon every 2-3 hours.  Denies feeling dizzy or lightheaded.   Per guidelines advised to be seen with in 3 days. Caller verbalized understanding. Denies further questions.    Alfreda Dawson RN  [facility]      Reason for Disposition    [1] Vaginal bleeding or spotting lasts > 5 weeks AND [2] pale-brown or pink    Additional Information    Negative: Shock suspected (e.g., cold/pale/clammy skin, too weak to stand, low BP, rapid pulse)    Negative: Difficult to awaken or acting confused  (e.g., disoriented, slurred speech)    Negative: Passed out (i.e., lost consciousness, collapsed and was not responding)    Negative: Sounds like a life-threatening emergency to the triager    Negative: SEVERE dizziness (e.g., unable to stand, requires support to walk, feels like passing out now)    Negative: [1] SEVERE abdominal pain AND [2] present > 1 hour    Negative: Fever > 100.4 F (38.0 C)    Negative: SEVERE vaginal bleeding (i.e., soaking 2 pads or tampons per hour and present 2 or more hours)    Negative: Patient sounds very sick or weak to the triager    Negative: MODERATE vaginal bleeding (i.e., soaking 1 pad or tampon per hour and present > 6 hours)    Negative: [1] Constant abdominal pain AND [2] present > 2 hours    Negative: Pale skin (pallor) of new onset or worsening    Negative: Foul smelling vaginal discharge (i.e., lochia)    Negative: Bleeding with > 6 soaked pads per day    Negative: [1] Passing blood clots  AND [2] persists > 4 days postpartum    Negative: Taking Coumadin (warfarin) or other strong blood thinner, or known bleeding disorder (e.g., thrombocytopenia)    Negative: [1] Skin bruises or nosebleed AND [2] not caused by an injury    Negative: [1]  Vaginal bleeding or spotting lasts > 4 weeks AND [2] red or red-brown    Protocols used: POSTPARTUM - VAGINAL BLEEDING AND LOCHIA-ADULT-AH

## 2018-06-26 ENCOUNTER — OFFICE VISIT (OUTPATIENT)
Dept: OBGYN | Facility: CLINIC | Age: 28
End: 2018-06-26
Payer: COMMERCIAL

## 2018-06-26 VITALS
HEIGHT: 58 IN | HEART RATE: 86 BPM | DIASTOLIC BLOOD PRESSURE: 62 MMHG | BODY MASS INDEX: 30.56 KG/M2 | SYSTOLIC BLOOD PRESSURE: 111 MMHG | TEMPERATURE: 97.8 F | RESPIRATION RATE: 16 BRPM | WEIGHT: 145.6 LBS

## 2018-06-26 DIAGNOSIS — N93.8 DUB (DYSFUNCTIONAL UTERINE BLEEDING): Primary | ICD-10-CM

## 2018-06-26 LAB — HCG UR QL: NEGATIVE

## 2018-06-26 PROCEDURE — 99214 OFFICE O/P EST MOD 30 MIN: CPT | Performed by: OBSTETRICS & GYNECOLOGY

## 2018-06-26 PROCEDURE — 81025 URINE PREGNANCY TEST: CPT | Performed by: OBSTETRICS & GYNECOLOGY

## 2018-06-26 RX ORDER — LEVONORGESTREL/ETHIN.ESTRADIOL 0.1-0.02MG
1 TABLET ORAL DAILY
Qty: 84 TABLET | Refills: 3 | Status: SHIPPED | OUTPATIENT
Start: 2018-06-26 | End: 2019-03-08

## 2018-06-26 NOTE — MR AVS SNAPSHOT
"              After Visit Summary   2018    Anu Davis    MRN: [id number]           Patient Information     Date Of Birth          1990        Visit Information        Provider Department      2018 9:00 AM Josi Garcia MD [facility]        Today's Diagnoses     DUB (dysfunctional uterine bleeding)    -  1       Follow-ups after your visit        Who to contact     If you have questions or need follow up information about today's clinic visit or your schedule please contact [facility] directly at [phone].  Normal or non-critical lab and imaging results will be communicated to you by [employer], letter or phone within 4 business days after the clinic has received the results. If you do not hear from us within 7 days, please contact the clinic through [employer] or phone. If you have a critical or abnormal lab result, we will notify you by phone as soon as possible.  Submit refill requests through [employer] or call your pharmacy and they will forward the refill request to us. Please allow 3 business days for your refill to be completed.          Additional Information About Your Visit        MyChart Information     [employer] lets you send messages to your doctor, view your test results, renew your prescriptions, schedule appointments and more. To sign up, go to [email or web address] . Click on \"Log in\" on the left side of the screen, which will take you to the Welcome page. Then click on \"Sign up Now\" on the right side of the page.     You will be asked to enter the access code listed below, as well as some personal information. Please follow the directions to create your username and password.     Your access code is: [email or web address]  Expires: 2018  9:09 AM     Your access code will  in 90 days. If you need help or a new code, please call your [facility] or [phone].        Care EveryWhere ID     This is your Care EveryWhere ID. This " "could be used by other organizations to access your [place] medical records  [id number]        Your Vitals Were     Pulse Temperature Respirations Height Last Period Breastfeeding?    86 97.8  F (36.6  C) (Tympanic) 16 4' 10\" (1.473 m) 06/08/2018 No    BMI (Body Mass Index)                   30.43 kg/m2            Blood Pressure from Last 3 Encounters:   06/26/18 111/62   05/15/18 105/67   04/04/18 101/56    Weight from Last 3 Encounters:   06/26/18 145 lb 9.6 oz (66 kg)   05/15/18 138 lb (62.6 kg)   03/30/18 160 lb 6.4 oz (72.8 kg)              We Performed the Following     HCG Qual, Urine - JUNIOR,  Travis La  ([id number])          Today's Medication Changes          These changes are accurate as of 6/26/18 10:56 AM.  If you have any questions, ask your nurse or doctor.               Start taking these medicines.        Dose/Directions    levonorgestrel-ethinyl estradiol 0.1-20 MG-MCG per tablet   Commonly known as:  AVIANE,MATILDE,JORDANINA   Used for:  DUB (dysfunctional uterine bleeding)   Started by:  Josi Garcia MD        Dose:  1 tablet   Take 1 tablet by mouth daily   Quantity:  84 tablet   Refills:  3            Where to get your medicines      These medications were sent to [facility] [address]  [address] [place] [id number]     Phone:  [phone]     levonorgestrel-ethinyl estradiol 0.1-20 MG-MCG per tablet                Primary Care Provider Fax #    Physician No Ref-Primary [phone]       No address on file        Equal Access to Services     SOLEDAD MUNSON : uriah Jones, lucinda rodriguez. So [facility] [phone].    ATENCIÓN: Si habla español, tiene a mosquera disposición servicios gratuitos de asistencia lingüística. Llame al [phone].    We comply with applicable federal civil rights laws and [place] laws. We do not discriminate on the basis " of race, color, national origin, age, disability, sex, sexual orientation, or gender identity.            Thank you!     Thank you for choosing [facility]  for your care. Our goal is always to provide you with excellent care. Hearing back from our patients is one way we can continue to improve our services. Please take a few minutes to complete the written survey that you may receive in the mail after your visit with us. Thank you!             Your Updated Medication List - Protect others around you: Learn how to safely use, store and throw away your medicines at www.disposemymeds.org.          This list is accurate as of 6/26/18 10:56 AM.  Always use your most recent med list.                   Brand Name Dispense Instructions for use Diagnosis    levonorgestrel-ethinyl estradiol 0.1-20 MG-MCG per tablet    AVIANE,ALESSE,LESSINA    84 tablet    Take 1 tablet by mouth daily    DUB (dysfunctional uterine bleeding)

## 2018-06-26 NOTE — PROGRESS NOTES
"27 year old  here for abnormal bleeding.  She had her baby by  3 months ago.  She started having her cycles every 2 weeks.  Normal length and normal flow.  No pain.  She is only using condoms for contraception.  She is not breastfeeding.  She is not planning to get pregnant for the next 3 years.  She was on birth control, ocps, prior to the pregnancy and was happy with that.  She doesn't smoke.      Pap 2016 normal  Past Medical History:   Diagnosis Date     Chickenpox      Tenosynovitis      Past Surgical History:   Procedure Laterality Date      SECTION N/A 2018    Procedure:  SECTION;;  Surgeon: Wang Pelletier MD;  Location: [facility] OR     WRIST SURGERY Right     ganglion removal     meds-reviewed in [facility]  FH-reviewed in [facility]  Social history-reviewed in [facility]   ROS: 10 point ROS neg other than the symptoms noted above in the HPI.  /62 (BP Location: Right arm, Patient Position: Chair, Cuff Size: Adult Regular)  Pulse 86  Temp 97.8  F (36.6  C) (Tympanic)  Resp 16  Ht 4' 10\" (1.473 m)  Wt 145 lb 9.6 oz (66 kg)  LMP 2018  Breastfeeding? No  BMI 30.43 kg/m2  Alert and orientedx3, in NAD  ASSESSMENT / PLAN:  (N93.8) DUB (dysfunctional uterine bleeding)  (primary encounter diagnosis)  Comment: probably anovulatory bleeding, HCG negative.  Plan: levonorgestrel-ethinyl estradiol         (AVIANE,ALESSE,LESSINA) 0.1-20 MG-MCG per         tablet, HCG Qual, Urine - [facility],  Abhinav         [place]  ([id number])        Discussed trying to cycle with ocps and also for birth control which she desires.  Risks/benefits/alternatives discussed. If doesn't improve would get a pelvic US and check a thyroid level.  F/u if no improvement.  Give it 3 months to regulate the cycle  25 minutes was spent face to face with the patient today discussing her history, diagnosis, and follow-up plan as noted above.  Over 50% of the visit was spent in counseling and coordination of " care.    Total Visit Time: 25 minutes.  Josi Garcia MD

## 2018-08-29 ENCOUNTER — OFFICE VISIT (OUTPATIENT)
Dept: OBGYN | Facility: CLINIC | Age: 28
End: 2018-08-29
Payer: COMMERCIAL

## 2018-08-29 VITALS
BODY MASS INDEX: 31.32 KG/M2 | HEART RATE: 76 BPM | RESPIRATION RATE: 16 BRPM | HEIGHT: 58 IN | DIASTOLIC BLOOD PRESSURE: 71 MMHG | TEMPERATURE: 98.4 F | SYSTOLIC BLOOD PRESSURE: 109 MMHG | WEIGHT: 149.2 LBS

## 2018-08-29 DIAGNOSIS — Z30.41 ENCOUNTER FOR SURVEILLANCE OF CONTRACEPTIVE PILLS: Primary | ICD-10-CM

## 2018-08-29 DIAGNOSIS — N92.1 METRORRHAGIA: ICD-10-CM

## 2018-08-29 PROBLEM — Z34.00 PRENATAL CARE, FIRST PREGNANCY: Status: RESOLVED | Noted: 2018-01-17 | Resolved: 2018-08-29

## 2018-08-29 PROBLEM — Z34.90 PREGNANCY: Status: RESOLVED | Noted: 2018-04-01 | Resolved: 2018-08-29

## 2018-08-29 PROBLEM — Z34.00 SUPERVISION OF NORMAL FIRST PREGNANCY: Status: RESOLVED | Noted: 2018-03-29 | Resolved: 2018-08-29

## 2018-08-29 PROCEDURE — 99213 OFFICE O/P EST LOW 20 MIN: CPT | Performed by: OBSTETRICS & GYNECOLOGY

## 2018-08-29 RX ORDER — LEVONORGESTREL AND ETHINYL ESTRADIOL 0.15-0.03
1 KIT ORAL DAILY
Qty: 91 TABLET | Refills: 1 | Status: SHIPPED | OUTPATIENT
Start: 2018-08-29 | End: 2019-03-08

## 2018-08-29 NOTE — NURSING NOTE
"Initial /71 (BP Location: Right arm, Patient Position: Chair, Cuff Size: Adult Regular)  Pulse 76  Temp 98.4  F (36.9  C) (Tympanic)  Resp 16  Ht 4' 10\" (1.473 m)  Wt 149 lb 3.2 oz (67.7 kg)  LMP 08/04/2018  Breastfeeding? No  BMI 31.18 kg/m2 Estimated body mass index is 31.18 kg/(m^2) as calculated from the following:    Height as of this encounter: 4' 10\" (1.473 m).    Weight as of this encounter: 149 lb 3.2 oz (67.7 kg). .    Cookie Dumont CMA    "

## 2018-08-29 NOTE — PROGRESS NOTES
CC:  Follow up  from herself for BTB on OCP  HPI:  Anu Davis is a 27 year old female is a   .  Patient's last menstrual period was 2018.  Menses are regular q 28-30 days, lasting 4 days. She had a  delivery on 2018.  She did not breastfeed.  Her lochia tapered and then she started having vaginal bleeding q 2 weeks  She was started on an OCP and the bleeding stopped.  She had a normal period after the first pack , but bled for 2 weeks starting midcycle of the second pack.  She had no other side effects.  She was on an OCP in the past for acne, but cannot remember the name.   She has no interest in IUD or injectables.  She has no other bleeding diatheses  Patients records are available and reviewed at today's visit.    Past GYN history:  No STD history       Last PAP smear:  Normal      Past Medical History:   Diagnosis Date     Chickenpox      Tenosynovitis        Past Surgical History:   Procedure Laterality Date      SECTION N/A 2018    Procedure:  SECTION;;  Surgeon: Wang Pelleiter MD;  Location: [facility]     WRIST SURGERY Right     ganglion removal       Family History   Problem Relation Age of Onset     Depression Mother      situational     Medical History Unknown Mother      mother adopted     Hypertension Father      Emphysema Paternal Grandmother        Allergies: Review of patient's allergies indicates no known allergies.    Current Outpatient Prescriptions   Medication Sig Dispense Refill     levonorgestrel-ethinyl estradiol (SEASONALE) 0.15-0.03 MG per tablet Take 1 tablet by mouth daily 91 tablet 1     levonorgestrel-ethinyl estradiol (AVIANE,ALESSE,LESSINA) 0.1-20 MG-MCG per tablet Take 1 tablet by mouth daily (Patient not taking: Reported on 2018) 84 tablet 3       ROS:  C: NEGATIVE for fever, chills, change in weight  I: NEGATIVE for worrisome rashes, moles or lesions  E: NEGATIVE for vision changes or irritation  E/M: NEGATIVE for ear,  "mouth and throat problems  R: NEGATIVE for significant cough or SOB  CV: NEGATIVE for chest pain, palpitations or peripheral edema  GI: NEGATIVE for nausea, abdominal pain, heartburn, or change in bowel habits  : NEGATIVE for frequency, dysuria, hematuria, vaginal discharge  M: NEGATIVE for significant arthralgias or myalgia  N: NEGATIVE for weakness, dizziness or paresthesias  E: NEGATIVE for temperature intolerance, skin/hair changes  P: NEGATIVE for changes in mood or affect    EXAM:  Blood pressure 109/71, pulse 76, temperature 98.4  F (36.9  C), temperature source Tympanic, resp. rate 16, height 4' 10\" (1.473 m), weight 149 lb 3.2 oz (67.7 kg), last menstrual period 08/04/2018, not currently breastfeeding.   BMI= Body mass index is 31.18 kg/(m^2).  General - pleasant female in no acute distress.  No exam today     ASSESSMENT/PLAN:  (Z30.41) Encounter for surveillance of contraceptive pills  (primary encounter diagnosis)  (N92.1) Metrorrhagia  Comment: increase in the estrogen from 0.2 to 0.3  Increase in the norethindrone from 0.1 to 0.15  Plan: levonorgestrel-ethinyl estradiol (SEASONALE)         0.15-0.03 MG per tablet            She expressed understanding and will be starting the pill today      Letter will be sent to the referring provider.    Elver Jean  15 of 15 minutes spent in counseling    "

## 2018-08-29 NOTE — MR AVS SNAPSHOT
After Visit Summary   8/29/2018    Anu Davis    MRN: [id number]           Patient Information     Date Of Birth          1990        Visit Information        Provider Department      8/29/2018 1:30 PM Elver Jean MD [facility]        Today's Diagnoses     Encounter for surveillance of contraceptive pills    -  1    Metrorrhagia           Follow-ups after your visit        Follow-up notes from your care team     Return if symptoms worsen or fail to improve.      Your next 10 appointments already scheduled     Sep 13, 2018  9:30 AM CDT   Office Visit with Wang Pelletier MD   [facility]    [address] [place] [id number] [phone]           Bring a current list of meds and any records pertaining to this visit. For Physicals, please bring immunization records and any forms needing to be filled out. Please arrive 10 minutes early to complete paperwork.              Who to contact     If you have questions or need follow up information about today's clinic visit or your schedule please contact [facility] directly at [phone].  Normal or non-critical lab and imaging results will be communicated to you by MyChart, letter or phone within 4 business days after the clinic has received the results. If you do not hear from us within 7 days, please contact the clinic through [facility] or phone. If you have a critical or abnormal lab result, we will notify you by phone as soon as possible.  Submit refill requests through [employer] or call your pharmacy and they will forward the refill request to us. Please allow 3 business days for your refill to be completed.          Additional Information About Your Visit        MyChart Information     [employer] gives you secure access to your electronic health record. If you see a primary care provider, you can also send messages to your care team and make  "appointments. If you have questions, please call your primary care clinic.  If you do not have a primary care provider, please call [phone] and they will assist you.        Care EveryWhere ID     This is your Care EveryWhere ID. This could be used by other organizations to access your [facility] medical records  [id number]        Your Vitals Were     Pulse Temperature Respirations Height Last Period Breastfeeding?    76 98.4  F (36.9  C) (Tympanic) 16 4' 10\" (1.473 m) 08/04/2018 No    BMI (Body Mass Index)                   31.18 kg/m2            Blood Pressure from Last 3 Encounters:   08/29/18 109/71   06/26/18 111/62   05/15/18 105/67    Weight from Last 3 Encounters:   08/29/18 149 lb 3.2 oz (67.7 kg)   06/26/18 145 lb 9.6 oz (66 kg)   05/15/18 138 lb (62.6 kg)              Today, you had the following     No orders found for display         Today's Medication Changes          These changes are accurate as of 8/29/18  2:02 PM.  If you have any questions, ask your nurse or doctor.               Start taking these medicines.        Dose/Directions    levonorgestrel-ethinyl estradiol 0.15-0.03 MG per tablet   Commonly known as:  SEASONALE   Used for:  Encounter for surveillance of contraceptive pills   Started by:  Elver Jean MD        Dose:  1 tablet   Take 1 tablet by mouth daily   Quantity:  91 tablet   Refills:  1            Where to get your medicines      These medications were sent to [facility] [address]  [address] [address]     Phone:  [phone]     levonorgestrel-ethinyl estradiol 0.15-0.03 MG per tablet                Primary Care Provider Fax #    Physician No Ref-Primary [phone]       No address on file        Equal Access to Services     SOLEDAD MUNSON AH: Ronald Hurd, wafrancisco vega, qaybta kaalmajorge plasencia, lucinda bahena. So [facility] [phone].    ATENCIÓN: Si libby odell, " tiene a mosquera disposición servicios gratuitos de asistencia lingüística. Hiro mendoza [phone].    We comply with applicable federal civil rights laws and [place] laws. We do not discriminate on the basis of race, color, national origin, age, disability, sex, sexual orientation, or gender identity.            Thank you!     Thank you for choosing [facility]  for your care. Our goal is always to provide you with excellent care. Hearing back from our patients is one way we can continue to improve our services. Please take a few minutes to complete the written survey that you may receive in the mail after your visit with us. Thank you!             Your Updated Medication List - Protect others around you: Learn how to safely use, store and throw away your medicines at www.disposemymeds.org.          This list is accurate as of 8/29/18  2:02 PM.  Always use your most recent med list.                   Brand Name Dispense Instructions for use Diagnosis    levonorgestrel-ethinyl estradiol 0.1-20 MG-MCG per tablet    AVIANE,ALESSE,LESSINA    84 tablet    Take 1 tablet by mouth daily    DUB (dysfunctional uterine bleeding)       levonorgestrel-ethinyl estradiol 0.15-0.03 MG per tablet    SEASONALE    91 tablet    Take 1 tablet by mouth daily    Encounter for surveillance of contraceptive pills

## 2019-03-08 ENCOUNTER — OFFICE VISIT (OUTPATIENT)
Dept: OBGYN | Facility: CLINIC | Age: 29
End: 2019-03-08
Payer: COMMERCIAL

## 2019-03-08 VITALS
RESPIRATION RATE: 16 BRPM | TEMPERATURE: 98.8 F | HEART RATE: 79 BPM | SYSTOLIC BLOOD PRESSURE: 133 MMHG | BODY MASS INDEX: 31.49 KG/M2 | WEIGHT: 150 LBS | DIASTOLIC BLOOD PRESSURE: 65 MMHG | HEIGHT: 58 IN

## 2019-03-08 DIAGNOSIS — Z12.4 PAP SMEAR FOR CERVICAL CANCER SCREENING: ICD-10-CM

## 2019-03-08 DIAGNOSIS — N92.6 IRREGULAR MENSES: Primary | ICD-10-CM

## 2019-03-08 LAB
HCG UR QL: NEGATIVE
PROLACTIN SERPL-MCNC: 67 UG/L (ref 3–27)
TSH SERPL DL<=0.005 MIU/L-ACNC: 1.62 MU/L (ref 0.4–4)

## 2019-03-08 PROCEDURE — 84270 ASSAY OF SEX HORMONE GLOBUL: CPT | Performed by: OBSTETRICS & GYNECOLOGY

## 2019-03-08 PROCEDURE — 84403 ASSAY OF TOTAL TESTOSTERONE: CPT | Performed by: OBSTETRICS & GYNECOLOGY

## 2019-03-08 PROCEDURE — 81025 URINE PREGNANCY TEST: CPT | Performed by: OBSTETRICS & GYNECOLOGY

## 2019-03-08 PROCEDURE — G0145 SCR C/V CYTO,THINLAYER,RESCR: HCPCS | Performed by: OBSTETRICS & GYNECOLOGY

## 2019-03-08 PROCEDURE — 99214 OFFICE O/P EST MOD 30 MIN: CPT | Performed by: OBSTETRICS & GYNECOLOGY

## 2019-03-08 PROCEDURE — 36415 COLL VENOUS BLD VENIPUNCTURE: CPT | Performed by: OBSTETRICS & GYNECOLOGY

## 2019-03-08 PROCEDURE — 87624 HPV HI-RISK TYP POOLED RSLT: CPT | Performed by: OBSTETRICS & GYNECOLOGY

## 2019-03-08 PROCEDURE — 84146 ASSAY OF PROLACTIN: CPT | Performed by: OBSTETRICS & GYNECOLOGY

## 2019-03-08 PROCEDURE — 84443 ASSAY THYROID STIM HORMONE: CPT | Performed by: OBSTETRICS & GYNECOLOGY

## 2019-03-08 RX ORDER — MEDROXYPROGESTERONE ACETATE 10 MG
10 TABLET ORAL DAILY
Qty: 10 TABLET | Refills: 0 | Status: SHIPPED | OUTPATIENT
Start: 2019-03-08 | End: 2019-03-29

## 2019-03-08 RX ORDER — DROSPIRENONE AND ETHINYL ESTRADIOL 0.02-3(28)
1 KIT ORAL DAILY
Qty: 84 TABLET | Refills: 0 | Status: SHIPPED | OUTPATIENT
Start: 2019-03-08 | End: 2020-06-23

## 2019-03-08 ASSESSMENT — MIFFLIN-ST. JEOR: SCORE: 1305.15

## 2019-03-08 NOTE — PROGRESS NOTES
"Anu is a 28 year old  here for follow up of irregular menses.  Has been off hormonal contraception since July.  Last \"normal menses\" in August.  No bleeding since.  No pregnancy test yet.      Had been on oral contraceptive pills as a teenager for her acne.  When she discontinued, it \"took awhile\" to get regular.  No difficulties getting pregnancy.      ROS: Ten point review of systems was reviewed and negative except the above.    Gyne: - abn pap (last pap ), - STD's    PMH: Her past medical, surgical, and obstetric histories were reviewed and are documented in their appropriate chart areas.    ALL/Meds: Her medication and allergy histories were reviewed and are documented in their appropriate chart areas.    Social History     Tobacco Use     Smoking status: Never Smoker     Smokeless tobacco: Never Used   Substance Use Topics     Alcohol use: Yes     Comment: rare-quit with pregnancy     Drug use: No        PE: /65 (BP Location: Right arm, Patient Position: Chair, Cuff Size: Adult Regular)   Pulse 79   Temp 98.8  F (37.1  C) (Tympanic)   Resp 16   Ht 1.473 m (4' 10\")   Wt 68 kg (150 lb)   LMP  (LMP Unknown)   Breastfeeding? No   BMI 31.35 kg/m      General Appearance:  healthy, alert, active, no distress  HEENT: NCAT  Abdomen: Soft, nontender.  Normal bowel sounds.  No masses  Pelvic exam: normal vagina and vulva, normal cervix without lesions or tenderness, uterus normal size anteverted, adenxa normal in size without tenderness, pap smear done.    Urine Pregnancy Test: negative     A/P 28 year old  here for     ICD-10-CM    1. Irregular menses N92.6 TSH with free T4 reflex     Prolactin     US Pelvic Complete w Transvaginal     **Testosterone Free and Total FUTURE anytime     HCG Qual, Urine ([id number])     medroxyPROGESTERone (PROVERA) 10 MG tablet     drospirenone-ethinyl estradiol (BOBO) 3-0.02 MG tablet   2. Pap smear for cervical cancer screening Z12.4 Pap imaged thin layer " screen with HPV - recommended age 30 - 65 years (select HPV order below)     HPV High Risk Types DNA Cervical        1. Irregular/oligomenorrhea: reviewed potential etiologies including thyroid disease, hyperprolactinemia, PCOS, possible anatomic concerns (although less likely due to ability to have menses), and hypothalamic concerns.  Possible ovarian failure although this is less likely as well since she does not have any other symptoms.      Plan bloodwork and pelvic U/S to assess for a variety of etiologies.  Discussed provera withdrawal.  Since they are not planning on conception, we discussed trial of oral contraceptive pills.  She had difficulty with breakthrough bleeding on Seasonale.  Will trial Anuradha this time to see if there is improvement.      Ifrah Hernandez M.D.     30 minutes was spent face to face with the patient today discussing her history, diagnosis, and follow-up plan as noted above.  Over 50% of the visit was spent in counseling and coordination of care.

## 2019-03-08 NOTE — NURSING NOTE
"Initial /65 (BP Location: Right arm, Patient Position: Chair, Cuff Size: Adult Regular)   Pulse 79   Temp 98.8  F (37.1  C) (Tympanic)   Resp 16   Ht 1.473 m (4' 10\")   Wt 68 kg (150 lb)   Breastfeeding? No   BMI 31.35 kg/m   Estimated body mass index is 31.35 kg/m  as calculated from the following:    Height as of this encounter: 1.473 m (4' 10\").    Weight as of this encounter: 68 kg (150 lb). .    Cookie Dumont, ALLI    "

## 2019-03-12 ENCOUNTER — HOSPITAL ENCOUNTER (OUTPATIENT)
Dept: ULTRASOUND IMAGING | Facility: CLINIC | Age: 29
Discharge: HOME | End: 2019-03-12
Attending: OBSTETRICS & GYNECOLOGY | Admitting: OBSTETRICS & GYNECOLOGY
Payer: COMMERCIAL

## 2019-03-12 DIAGNOSIS — N92.6 IRREGULAR MENSES: ICD-10-CM

## 2019-03-12 LAB
COPATH REPORT: NORMAL
PAP: NORMAL

## 2019-03-12 PROCEDURE — 76830 TRANSVAGINAL US NON-OB: CPT

## 2019-03-13 LAB
FINAL DIAGNOSIS: NORMAL
HPV HR 12 DNA CVX QL NAA+PROBE: NEGATIVE
HPV16 DNA SPEC QL NAA+PROBE: NEGATIVE
HPV18 DNA SPEC QL NAA+PROBE: NEGATIVE
SHBG SERPL-SCNC: 43 NMOL/L (ref 30–135)
SPECIMEN DESCRIPTION: NORMAL
SPECIMEN SOURCE CVX/VAG CYTO: NORMAL
TESTOST FREE SERPL-MCNC: 0.62 NG/DL (ref 0.08–0.74)
TESTOST SERPL-MCNC: 41 NG/DL (ref 8–60)

## 2019-03-23 ENCOUNTER — HOSPITAL ENCOUNTER (OUTPATIENT)
Dept: MRI IMAGING | Facility: CLINIC | Age: 29
Discharge: HOME | End: 2019-03-23
Attending: OBSTETRICS & GYNECOLOGY | Admitting: OBSTETRICS & GYNECOLOGY
Payer: COMMERCIAL

## 2019-03-23 DIAGNOSIS — R79.89 ELEVATED PROLACTIN LEVEL: ICD-10-CM

## 2019-03-23 LAB
FSH SERPL-ACNC: 3 IU/L
PROLACTIN SERPL-MCNC: 66 UG/L (ref 3–27)

## 2019-03-23 PROCEDURE — 83001 ASSAY OF GONADOTROPIN (FSH): CPT | Performed by: OBSTETRICS & GYNECOLOGY

## 2019-03-23 PROCEDURE — 36415 COLL VENOUS BLD VENIPUNCTURE: CPT | Performed by: OBSTETRICS & GYNECOLOGY

## 2019-03-23 PROCEDURE — 84146 ASSAY OF PROLACTIN: CPT | Performed by: OBSTETRICS & GYNECOLOGY

## 2019-03-23 PROCEDURE — 25500064 ZZH RX 255 OP 636: Performed by: RADIOLOGY

## 2019-03-23 PROCEDURE — A9585 GADOBUTROL INJECTION: HCPCS | Performed by: RADIOLOGY

## 2019-03-23 PROCEDURE — 70553 MRI BRAIN STEM W/O & W/DYE: CPT

## 2019-03-23 RX ORDER — GADOBUTROL 604.72 MG/ML
7 INJECTION INTRAVENOUS ONCE
Status: COMPLETED | OUTPATIENT
Start: 2019-03-23 | End: 2019-03-23

## 2019-03-23 RX ADMIN — GADOBUTROL 7 ML: 604.72 INJECTION INTRAVENOUS at 10:08

## 2019-03-29 ENCOUNTER — OFFICE VISIT (OUTPATIENT)
Dept: INTERNAL MEDICINE | Facility: CLINIC | Age: 29
End: 2019-03-29
Payer: COMMERCIAL

## 2019-03-29 VITALS
SYSTOLIC BLOOD PRESSURE: 99 MMHG | HEART RATE: 79 BPM | WEIGHT: 148 LBS | TEMPERATURE: 97.2 F | BODY MASS INDEX: 30.93 KG/M2 | RESPIRATION RATE: 16 BRPM | DIASTOLIC BLOOD PRESSURE: 68 MMHG

## 2019-03-29 DIAGNOSIS — H01.004 BLEPHARITIS OF LEFT UPPER EYELID, UNSPECIFIED TYPE: Primary | ICD-10-CM

## 2019-03-29 DIAGNOSIS — D35.2 PROLACTINOMA (H): ICD-10-CM

## 2019-03-29 PROCEDURE — 99213 OFFICE O/P EST LOW 20 MIN: CPT | Performed by: INTERNAL MEDICINE

## 2019-03-29 RX ORDER — ERYTHROMYCIN 5 MG/G
0.5 OINTMENT OPHTHALMIC AT BEDTIME
Qty: 3.5 G | Refills: 0 | Status: SHIPPED | OUTPATIENT
Start: 2019-03-29 | End: 2020-12-30

## 2019-03-29 NOTE — PROGRESS NOTES
SUBJECTIVE:   Anu Davis is a 28 year old female who presents to clinic today for the following health issues:      Eyelid swelling      Duration: yesterday    Description (location/character/radiation): left eyelid swollen and red    Intensity:  mild    Accompanying signs and symptoms: none    History (similar episodes/previous evaluation): None    Precipitating or alleviating factors: None    Therapies tried and outcome: None     No vision changes, headache.  Otherwise feels well without complaints.    Social History     Social History Narrative     for grades 2-5 in [place]    Daughter born approx 4/2018     1. Blepharitis of left upper eyelid, unspecified type    2. Prolactinoma (H)        PMH: Updated and/or reviewed in chart.    PSH: Updated and/or reviewed in chart.    Family History: Updated and/or reviewed in chart.     ROS:  Constitutional, HEENT, cardiovascular, pulmonary, gi and gu systems are otherwise negative.    OBJECTIVE:                                                    BP 99/68   Pulse 79   Temp 97.2  F (36.2  C) (Tympanic)   Resp 16   Wt 67.1 kg (148 lb)   LMP  (LMP Unknown)   BMI 30.93 kg/m      GEN: No acute distress   EYES: EOMI grossly, pupils equal, no conjunctival injection, icterus, or edema  SKIN: mild anterior left upper eyelid edema with faint erythema, no nodularity appreciated and no expression  PSYCH: Normal mood and affect    Results for orders placed or performed during the hospital encounter of 03/23/19   MR Brain w/o & w Contrast    Narrative    MRI OF THE BRAIN/PITUITARY WITHOUT AND WITH CONTRAST  3/23/2019 10:22  AM     HISTORY:  Elevated prolactin level.    TECHNIQUE: Multiplanar multisequence MR images of the brain were  obtained with attention to the pituitary region. 7 mL Gadavist was  injected intravenously.    COMPARISON: None     FINDINGS: Normal T1 hyperintensity of the posterior pituitary. No  definite pituitary nodule seen on the  T2 images. Subtle questionable  hypoenhancing nodule in the left aspect of the pituitary gland  measuring 3 mm (series 15 image 32). The pituitary stalk is midline  and normal in appearance. The optic chiasm is normal. The cavernous  carotid flow voids appear within normal limits.     No evidence of intracranial mass, hemorrhage, or herniation. The brain  parenchyma, ventricles and subarachnoid spaces appear normal. No  suspicious intracranial enhancement.       Impression    IMPRESSION:  1. No definite pituitary adenoma is appreciated. The most suspicious  area is a questionable hypoenhancing 3 mm nodule in the left aspect of  the pituitary gland.  2. Otherwise normal MRI of the brain.    ELIZABETH PICHARDO MD      ASSESSMENT/PLAN:                                                    1. Blepharitis of left upper eyelid, unspecified type  Uncomplicated.  Discussed eye precautions, supportive care of warm compresses and avoidance of contact chemicals and makeup, and close follow-up if not improving.  Patient demonstrates understanding.  Patient agreed with plan and demonstrated understanding to contact us for help if not improving or sooner if worsening or if other questions or concerns arise.  - erythromycin (ROMYCIN) 5 MG/GM ophthalmic ointment; Place 0.5 inches Into the left eye At Bedtime  Dispense: 3.5 g (7 days); Refill: 0    2. Prolactinoma (H)  Presumed diagnosis noted       Orders Placed This Encounter     erythromycin (ROMYCIN) 5 MG/GM ophthalmic ointment              Kentrell Osborne MD

## 2019-04-02 ENCOUNTER — TELEPHONE (OUTPATIENT)
Dept: INTERNAL MEDICINE | Facility: CLINIC | Age: 29
End: 2019-04-02

## 2019-04-02 NOTE — TELEPHONE ENCOUNTER
Left message on voice mail for patient to call clinic. [phone]  Calling pt to ask what symptoms are.

## 2019-04-02 NOTE — TELEPHONE ENCOUNTER
Spoke with patient.   Providers note read as written.   Patient verbalized understanding.   No further questions at this time.     Juanita Calle RN BSN PHN

## 2019-04-02 NOTE — TELEPHONE ENCOUNTER
She was seen last Friday for her eye.  Symptoms have gotten worse.  The antibiotic doesn't seem to be helping. Please call to advise.

## 2019-04-02 NOTE — TELEPHONE ENCOUNTER
Spoke with pt and she said her left eye is more red and swollen and looks like a pimple that is about to pop. Informed pt that it sounds like she has a stye, she said you had told her it could turn into that. She is on erythromycin (ROMYCIN) 5 MG/GM ophthalmic ointment right now and wondering if you want her to stay on that or change to something else. Please advise, pharmacy pended.

## 2019-04-02 NOTE — TELEPHONE ENCOUNTER
Stye should self-express in the days or weeks ahead -- no new treatment needed, just the warm compresses.  Let us know if worsening symptoms.

## 2019-05-23 NOTE — PROGRESS NOTES
Pt received from Nurse Lesvia HILL Pt pushing at this time.   Time spent to review discharge medications and plan of care for home. Patient has decided to make his own f/u appointment with dr. Mcallister. We talked about Bacteremia. 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 Home med of Gleevec taken home by wife. Patient will adm medication for 12 Noon. Rochepin IV administered prior to discharge.

## 2019-09-11 ENCOUNTER — OFFICE VISIT (OUTPATIENT)
Dept: URGENT CARE | Facility: URGENT CARE | Age: 29
End: 2019-09-11
Payer: COMMERCIAL

## 2019-09-11 VITALS
RESPIRATION RATE: 16 BRPM | SYSTOLIC BLOOD PRESSURE: 114 MMHG | DIASTOLIC BLOOD PRESSURE: 78 MMHG | WEIGHT: 145 LBS | OXYGEN SATURATION: 97 % | BODY MASS INDEX: 30.31 KG/M2 | TEMPERATURE: 97.9 F | HEART RATE: 78 BPM

## 2019-09-11 DIAGNOSIS — S05.02XA ABRASION OF LEFT CORNEA, INITIAL ENCOUNTER: Primary | ICD-10-CM

## 2019-09-11 PROCEDURE — 99214 OFFICE O/P EST MOD 30 MIN: CPT | Performed by: FAMILY MEDICINE

## 2019-09-11 RX ORDER — POLYMYXIN B SULFATE AND TRIMETHOPRIM 1; 10000 MG/ML; [USP'U]/ML
1 SOLUTION OPHTHALMIC 4 TIMES DAILY
Qty: 1 BOTTLE | Refills: 0 | Status: SHIPPED | OUTPATIENT
Start: 2019-09-11 | End: 2019-09-18

## 2019-09-11 RX ORDER — ERYTHROMYCIN 5 MG/G
0.5 OINTMENT OPHTHALMIC 2 TIMES DAILY
Qty: 1 TUBE | Refills: 0 | Status: SHIPPED | OUTPATIENT
Start: 2019-09-11 | End: 2019-09-18

## 2019-09-11 NOTE — LETTER
[facility]  [address] [facility] [address]  Phone: [phone]    September 11, 2019        Anu CESAR Ryan  [address]  [facility] [address]          To whom it may concern:    RE: Anu CESAR Ryan    Patient was seen and treated today at our clinic.  Recommend staying home tomorrow 9/12/2019 and possibly 9/13 as well depending on her symptoms then     Please contact me for questions or concerns.      Sincerely,        Mary Lou Mckeon MD

## 2019-09-12 NOTE — PATIENT INSTRUCTIONS
Patient Education     Corneal Abrasion    You have received a scratch or scrape (abrasion) to your cornea. The cornea is the clear part in the front of the eye. This sensitive area is very painful when injured. You may make tears frequently, and your vision may be blurry until the injury heals. You may be sensitive to light.  This part of the body heals quickly. You can expect the pain to go away within 24 to 48 hours. If the abrasion is large or deep, your doctor may apply an eye patch, although this is not always done. An antibiotic ointment or eye drops may also be used to prevent infection.  Numbing drops may be used to relieve the pain temporarily so that your eyes can be examined. But these drops can t be prescribed for home use because that would prevent healing and lead to more serious problems. Also, if you can t feel your eye, there is a chance of accidentally injuring it further without knowing it.  Home care    A cold pack may be applied over the eye (or eye patch) for 20 minutes at a time, to reduce pain. To make a cold pack, put ice cubes in a plastic bag that seals at the top. Wrap the bag in a clean, thin towel or cloth.    You may use acetaminophen or ibuprofen to control pain, unless another pain medicine was prescribed. Note: If you have chronic liver or kidney disease or have ever had a stomach ulcer or GI bleeding, talk with your doctor before using these medicines.    Rest your eyes and don t read until symptoms are gone.    If you use contact lenses, don t wear them until all symptoms are gone.    If your vision is affected by the corneal abrasion or if an eye patch was applied, don t drive a motor vehicle or operate machinery until all symptoms are gone. You may have trouble judging distances using only one eye.    If your eyes are sensitive to light, try wearing sunglasses, or stay indoors until symptoms go away.  Follow-up care  Follow up with your healthcare provider, or as  advised.    If no patch was put on your eye and the pain continues for more than 48 hours, you should have another exam. Contact your healthcare provider to arrange this.    If your eye was patched and you were asked to remove the patch yourself, see your healthcare provider. Contact your healthcare provider if you still have pain after the patch is removed.    If you were given a return appointment for patch removal and re-examination, be sure to keep the appointment. Leaving the patch in place longer than advised could be harmful.  When to seek medical advice  Call your healthcare provider right away if any of these occur.    Increasing eye pain or pain that does not improve after 24 hours    Discharge from the eye    Increasing redness of the eye or swelling of the eyelids    Worsening vision    Symptoms get worse after the abrasion has healed  Date Last Reviewed: 7/1/2017 2000-2018 [employer]. [address] [place] [address]. All rights reserved. This information is not intended as a substitute for professional medical care. Always follow your healthcare professional's instructions.

## 2019-09-12 NOTE — PROGRESS NOTES
Chief complaint: scratch left eye    Tetanus uptodate    Denies any possibility of pregnancy declined a pregnancy test    Daughter 1.5 years old got excited and finger caught left eye  Eye has been watery and blurry but better with blinking  Red   Happened 2 hours ago  Some pain  denies photophobia  denies feeling of foreign body  denies URI symptoms  Tried supportive treatment no relief  Worsening symptoms hence came in    Problem list, Medication list, Allergies, and Medical/Social/Surgical histories reviewed in [facility] and updated as appropriate.    ROS:  General: negative for fever  EYE: as above  No fevers or chills chest pain or shortness of breath     OBJECTIVE:  /78   Pulse 78   Temp 97.9  F (36.6  C) (Oral)   Resp 16   Wt 65.8 kg (145 lb)   SpO2 97%   BMI 30.31 kg/m     General : Awake Alert not in any acute cardiorespiratory distress  Head:       Normocephalic Atraumatic  Eyes:    Pupils equally reactive to light and accomodation. Sclera not icteric. Extra occular muscles intact full and equal. No hyphema, no hypopyon, no ciliary flush. No eyelid swelling or periorbital cellulitis. Mild erythema of  left  conjunctiva.   ON FLUOROSCEINE DYE - LARGE AREA OF SUPERFICIAL CORNEAL ABRASION NOTED   Neurologic: No cranial nerve deficits.   Psych: Appropriate mood and affect. Pleasant  Skin: patient undressed to level of his/her comfort. No visible concerning lesions.      ASSESSMENT:    ICD-10-CM    1. Abrasion of left cornea, initial encounter S05.02XA trimethoprim-polymyxin b (POLYTRIM) 43338-5.1 UNIT/ML-% ophthalmic solution     erythromycin (ROMYCIN) 5 MG/GM ophthalmic ointment           PLAN:   Prescribed with polytrim and erythromycin ointment  Referred to eye - follow up tomorrow.   Alarm signs or symptoms discussed, if present recommend go to ER   Advised about symptoms which might herald more serious problems.    adverse reactions of medication discussed  advised to come back in right away if  with any worsening symptoms or if with no relief   aware to come in right away especially if with any blurring of vision, photophobia, pain, feeling of foreign body.   despite treatment plan  patient voiced understanding and had no further questions at this time.        Mary Lou Mckeon MD

## 2020-03-11 ENCOUNTER — HEALTH MAINTENANCE LETTER (OUTPATIENT)
Age: 30
End: 2020-03-11

## 2020-06-23 ENCOUNTER — VIRTUAL VISIT (OUTPATIENT)
Dept: OBGYN | Facility: CLINIC | Age: 30
End: 2020-06-23
Payer: COMMERCIAL

## 2020-06-23 DIAGNOSIS — E22.1 HYPERPROLACTINEMIA (H): Primary | ICD-10-CM

## 2020-06-23 PROCEDURE — 99213 OFFICE O/P EST LOW 20 MIN: CPT | Mod: 95 | Performed by: OBSTETRICS & GYNECOLOGY

## 2020-06-23 NOTE — PROGRESS NOTES
"Anu Davis is a 29 year old female who is being evaluated via a billable telephone visit.      The patient has been notified of following:     \"This telephone visit will be conducted via a call between you and your physician/provider. We have found that certain health care needs can be provided without the need for a physical exam.  This service lets us provide the care you need with a short phone conversation.  If a prescription is necessary we can send it directly to your pharmacy.  If lab work is needed we can place an order for that and you can then stop by our lab to have the test done at a later time.    Telephone visits are billed at different rates depending on your insurance coverage. During this emergency period, for some insurers they may be billed the same as an in-person visit.  Please reach out to your insurance provider with any questions.    If during the course of the call the physician/provider feels a telephone visit is not appropriate, you will not be charged for this service.\"    Patient has given verbal consent for Telephone visit?  Yes    What phone number would you like to be contacted at? [phone]      How would you like to obtain your AVS? [employer]    Phone call duration: 15 minutes    Anu is a 29 year old  here for follow up of hyperprolactinemia and irregular menses.  Thinking of conceiving in the next year (same partner).  No further nipple discharge since she started on cabergoline, but still with some breast tenderness.  Menses are every 1.5 to 2 months, but more regular compared to prior to being on cabergoline 0.5 mg twice weekly.  Has not rechecked her prolactin levels yet.  Fhx of thyroid issues.  Notes increased heat intolerance/hot flashes.      ROS: Ten point review of systems was reviewed and negative except the above.    PMH: Her past medical, surgical, and obstetric histories were reviewed and are documented in their appropriate chart areas.    ALL/Meds: Her " medication and allergy histories were reviewed and are documented in their appropriate chart areas.    Social History     Tobacco Use     Smoking status: Never Smoker     Smokeless tobacco: Never Used   Substance Use Topics     Alcohol use: Yes     Comment: rare-quit with pregnancy     Drug use: No        FH: Her family history was reviewed and documented in its appropriate chart area.     Component      Latest Ref Rng & Units 3/8/2019 3/23/2019   Testosterone Total      8 - 60 ng/dL 41    Sex Hormone Binding Globulin      30 - 135 nmol/L 43    Free Testosterone Calculated      0.08 - 0.74 ng/dL 0.62    TSH      0.40 - 4.00 mU/L 1.62    Prolactin      3 - 27 ug/L 67 (H) 66 (H)   FSH      IU/L  3.0       A/P 29 year old  here for     ICD-10-CM    1. Hyperprolactinemia (H)  E22.1 Prolactin     TSH     Follicle stimulating hormone        1. Her irregular menses is still most likely to be related to her elevated prolactin.  However, we have not rechecked her levels since starting on medication, which she admits missing an occasional dose.  Plan reassessment of lab work including prolactin, TSH (strong fhx thyroid disease), and FSH (due to hot flashes although unlikely to be elevated given her age).      Discussed consideration for medication such as clomid in the event she has difficulty conceiving or using cyclic progesterone to ensure more regular menses.      Patient amenable to lab work to see where she is currently.  Will make further decisions about treatment once lab work is available.     Ifrah Hernandez M.D.

## 2020-07-05 ENCOUNTER — VIRTUAL VISIT (OUTPATIENT)
Dept: FAMILY MEDICINE | Facility: OTHER | Age: 30
End: 2020-07-05

## 2020-07-05 ENCOUNTER — NURSE TRIAGE (OUTPATIENT)
Dept: NURSING | Facility: CLINIC | Age: 30
End: 2020-07-05

## 2020-07-05 NOTE — TELEPHONE ENCOUNTER
"Patient is not sure if she is having allergy symptoms or Covid.  Sore throat symptoms, began Thursday.  \"It comes and goes\", she states that she  Notices the pain more when she is less active. She wonders if she should be tested for coronavirus?  Current pain=\"2\". She has had this type of sore throat before with allergies.   She is a teacher starting summer school tomorrow.   [facility]  Has access to the internet: [employer].   Triaged to a disposition of Call PCP when office is open.  Discussed option of [employer] which she is interested in doing tonight, so that she can determine whether or not she should return to work tomorrow.     Reason for Disposition    [1] COVID-19 infection suspected by caller or triager AND [2] mild symptoms (cough, fever, or others) AND [3] no complications or SOB    Additional Information    Negative: SEVERE difficulty breathing (e.g., struggling for each breath, speaks in single words)    Negative: Difficult to awaken or acting confused (e.g., disoriented, slurred speech)    Negative: Bluish (or gray) lips or face now    Negative: Shock suspected (e.g., cold/pale/clammy skin, too weak to stand, low BP, rapid pulse)    Negative: Sounds like a life-threatening emergency to the triager    Negative: [1] COVID-19 exposure AND [2] no symptoms    Negative: COVID-19 and Breastfeeding, questions about    Negative: [1] Adult with possible COVID-19 symptoms AND [2] triager concerned about severity of symptoms or other causes    Negative: SEVERE or constant chest pain or pressure (Exception: mild central chest pain, present only when coughing)    Negative: MODERATE difficulty breathing (e.g., speaks in phrases, SOB even at rest, pulse 100-120)    Negative: Patient sounds very sick or weak to the triager    Negative: MILD difficulty breathing (e.g., minimal/no SOB at rest, SOB with walking, pulse <100)    Negative: Chest pain or pressure    Negative: Fever > 103 F (39.4 C)    " Negative: [1] Fever > 101 F (38.3 C) AND [2] age > 60    Negative: [1] Fever > 100.0 F (37.8 C) AND [2] bedridden (e.g., nursing home patient, CVA, chronic illness, recovering from surgery)    Negative: HIGH RISK patient (e.g., age > 64 years, diabetes, heart or lung disease, weak immune system)    Negative: Fever present > 3 days (72 hours)    Negative: [1] Fever returns after gone for over 24 hours AND [2] symptoms worse or not improved    Negative: [1] Continuous (nonstop) coughing interferes with work or school AND [2] no improvement using cough treatment per protocol    Protocols used: CORONAVIRUS (COVID-19) DIAGNOSED OR [id number] 5.16.20  COVID 19 Nurse Triage Plan/Patient Instructions    Please be aware that novel coronavirus (COVID-19) may be circulating in the community. If you develop symptoms such as fever, cough, or SOB or if you have concerns about the presence of another infection including coronavirus (COVID-19), please contact your health care provider or visit [email or web address].     Disposition/Instructions    Patient to schedule a Virtual Visit with provider. Reference Visit Selection Guide.    Thank you for taking steps to prevent the spread of this virus.  o Limit your contact with others.  o Wear a simple mask to cover your cough.  o Wash your hands well and often.    Resources    [facility]: About COVID-19: [email or web address]    CDC: What to Do If You're Sick: www.cdc.gov/coronavirus/2019-ncov/about/steps-when-sick.html    CDC: Ending Home Isolation: www.cdc.gov/coronavirus/2019-ncov/hcp/disposition-in-home-patients.html     CDC: Caring for Someone: www.cdc.gov/coronavirus/2019-ncov/if-you-are-sick/care-for-someone.html     [facility]: Interim Guidance for Hospital Discharge to Home: www.health.[facility].[place]/diseases/coronavirus/hcp/hospdischarge.pdf    [facility] clinical trials (COVID-19 research studies): clinicalaffairs.[facility]-clinical-trials     Below are  the COVID-19 hotlines at the [place] [facility] ([facility]). Interpreters are available.   o For health questions: Call 927-716-9624 or 1-193.172.3355 (7 a.m. to 7 p.m.)  o For questions about schools and childcare: Call 880-101-3120 or [phone] (7 a.m. to 7 p.m.)

## 2020-07-06 NOTE — PROGRESS NOTES
"Date: 2020 18:26:28  Clinician: Margoth Lin  Clinician NPI: [id number]  Patient: Anu Davis  Patient : 1990  Patient Address: [address] [place] [address] [place] [address]  Patient Phone: [phone]  Visit Protocol: URI  Patient Summary:  Anu is a 29 year old ( : 1990 ) female who initiated a Visit for COVID-19 (Coronavirus) evaluation and screening. When asked the question \"Please sign me up to receive news, health information and promotions from [employer].\", Anu responded \"No\".    Anu states her symptoms started suddenly 3-4 days ago.   Her symptoms consist of a sore throat and nasal congestion.   Symptom details     Nasal secretions: The color of her mucus is clear.    Sore throat: Anu reports having mild throat pain (1-3 on a 10 point pain scale), does not have exudate on her tonsils, and can swallow liquids. She is not sure if the lymph nodes in her neck are enlarged. A rash has not appeared on the skin since the sore throat started.      Anu denies having wheezing, nausea, teeth pain, ageusia, diarrhea, vomiting, rhinitis, malaise, ear pain, headache, chills, myalgias, anosmia, facial pain or pressure, fever, and cough. She also denies having recent facial or sinus surgery in the past 60 days, taking antibiotic medication in the past month, and double sickening (worsening symptoms after initial improvement). She is not experiencing dyspnea.   Precipitating events  Within the past week, Anu has not been exposed to someone with strep throat.   Pertinent COVID-19 (Coronavirus) information  In the past 14 days, Anu has not worked in a congregate living setting.   She does not work or volunteer as healthcare worker or a  and does not work or volunteer in a healthcare facility.   Anu also has not lived in a congregate living setting in the past 14 days. She does not live with a healthcare worker.   Anu has not had a close contact with a " laboratory-confirmed COVID-19 patient within 14 days of symptom onset.   Pertinent medical history  Anu does not get yeast infections when she takes antibiotics.   Anu needs a return to work/school note.   Weight: 145 lbs   Anu does not smoke or use smokeless tobacco.   She denies pregnancy and denies breastfeeding. She has menstruated in the past month.   Additional information as reported by the patient (free text): I have had sore throat symptoms. Over the past 3-4 days of being in a chlorinated pool, outside, air conditioning. Wondering if it's allergies or if I should get tested for Covid-19. I start summer school tomorrow and wanting to get an opinion on the situation. I don't want to put anyone at risk.   Weight: 145 lbs    MEDICATIONS: cabergoline oral, ALLERGIES: NKDA  Clinician Response:  Dear Anu,   Your symptoms show that you may have coronavirus (COVID-19). This illness can cause fever, cough and trouble breathing. Many people get a mild case and get better on their own. Some people can get very sick.  What should I do?  We would like to test you for this virus.   1. Please call [phone] to schedule your visit. Explain that you were referred by [facility] to have a COVID-19 test. Be ready to share your [facility] visit ID number.  The following will serve as your written order for this COVID Test, ordered by me, for the indication of suspected COVID [Z20.828]: The test will be ordered in [facility], our electronic health record, after you are scheduled. It will show as ordered and authorized by Elbert Busby MD.  Order: COVID-19 (Coronavirus) PCR for SYMPTOMATIC testing from [facility].    2. When it's time for your COVID test:  Stay at least 6 feet away from others. (If someone will drive you to your test, stay in the backseat, as far away from the  as you can.)   Cover your mouth and nose with a mask, tissue or washcloth.  Go straight to the testing site. Don't make any stops on the way there or  "back.    3.Starting now: Stay home and away from others (self-isolate) until:   You've had no fever---and no medicine that reduces fever---for 3 full days (72 hours). And...  Your other symptoms have gotten better. For example, your cough or breathing has improved. And...  At least 10 days have passed since your symptoms started.    During this time, don't leave the house except for testing or medical care.   Stay in your own room, even for meals. Use your own bathroom if you can.   Stay away from others in your home. No hugging, kissing or shaking hands. No visitors.  Don't go to work, school or anywhere else.    Clean \"high touch\" surfaces often (doorknobs, counters, handles, etc.). Use a household cleaning spray or wipes. You'll find a full list of  on the EPA website: www.epa.gov/pesticide-registration/list-n-disinfectants-use-against-sars-cov-2.   Cover your mouth and nose with a mask, tissue or washcloth to avoid spreading germs.  Wash your hands and face often. Use soap and water.  Caregivers in these groups are at risk for severe illness due to COVID-19:  o People 65 years and older  o People who live in a nursing home or long-term care facility  o People with chronic disease (lung, heart, cancer, diabetes, kidney, liver, immunologic)  o People who have a weakened immune system, including those who:   Are in cancer treatment  Take medicine that weakens the immune system, such as corticosteroids  Had a bone marrow or organ transplant  Have an immune deficiency  Have poorly controlled HIV or AIDS  Are obese (body mass index of 40 or higher)  Smoke regularly   o Caregivers should wear gloves while washing dishes, handling laundry and cleaning bedrooms and bathrooms.  o Use caution when washing and drying laundry: Don't shake dirty laundry, and use the warmest water setting that you can.  o For more tips, go to www.cdc.gov/coronavirus/2019-ncov/downloads/10Things.pdf.   4.Sign up for [facility]. We " know it's scary to hear that you might have COVID-19. We want to track your symptoms to make sure you're okay over the next 2 weeks. Please look for an email from [employer]---this is a free, online program that we'll use to keep in touch. To sign up, follow the link in the email. Learn more at [email or web address]  How can I take care of myself?   Get lots of rest. Drink extra fluids(unless a doctor has told you not to).  Take Tylenol (acetaminophen) for fever or pain. If you have liver or kidney problems, ask your family doctor if it's okay to take Tylenol.   Adults can take either:    650 mg (two 325 mg pills) every 4 to 6 hours, or...  1,000 mg (two 500 mg pills) every 8 hours as needed.   Note: Don't take more than 3,000 mg in one day. Acetaminophen is found in many medicines (both prescribed and over-the-counter medicines). Read all labels to be sure you don't take too much.   For children, check the Tylenol bottle for the right dose. The dose is based on the child's age or weight.    If you have other health problems (like cancer, heart failure, an organ transplant or severe kidney disease):Call your specialty clinic if you don't feel better in the next 2 days.    Know when to call 911. Emergency warning signs include:   Trouble breathing or shortness of breath Pain or pressure in the chest that doesn't go away Feeling confused like you haven't felt before, or not being able to wake up Bluish-colored lips or face.  Where can I get more information?    [employer] -- About COVID-19: [email or web address]  CDC -- What to Do If You're Sick: www.cdc.gov/coronavirus/2019-ncov/about/steps-when-sick.html  CDC -- Ending Home Isolation: www.cdc.gov/coronavirus/2019-ncov/hcp/disposition-in-home-patients.html  CDC -- Caring for Someone: www.cdc.gov/coronavirus/2019-ncov/if-you-are-sick/care-for-someone.html  [facility] -- Interim Guidance for Hospital Discharge to Home:  www.health.[facility].[place].us/diseases/coronavirus/hcp/hospdischarge.pdf  [facility] clinical trials (COVID-19 research studies): clinicalaffairs.[facility]/umn-clinical-trials  Below are the COVID-19 hotlines at the [facility] ([facility]). Interpreters are available.    For health questions: Call 005-009-8388 or 1-874.257.8238 (7 a.m. to 7 p.m.) For questions about schools and childcare: Call 317-053-6532 or 1-789.810.7362 (7 a.m. to 7 p.m.)    Diagnosis: Acute pharyngitis, unspecified  Diagnosis ICD: J02.9

## 2020-07-07 DIAGNOSIS — Z20.822 SUSPECTED COVID-19 VIRUS INFECTION: Primary | ICD-10-CM

## 2020-07-07 PROCEDURE — U0003 INFECTIOUS AGENT DETECTION BY NUCLEIC ACID (DNA OR RNA); SEVERE ACUTE RESPIRATORY SYNDROME CORONAVIRUS 2 (SARS-COV-2) (CORONAVIRUS DISEASE [COVID-19]), AMPLIFIED PROBE TECHNIQUE, MAKING USE OF HIGH THROUGHPUT TECHNOLOGIES AS DESCRIBED BY CMS-2020-01-R: HCPCS | Performed by: FAMILY MEDICINE

## 2020-07-07 NOTE — LETTER
July 9, 2020        Anu Davis  [address]  [place] [address]    This letter provides a written record that you were tested for COVID-19 on 7/7/20.       Your result was negative. This means that we didn t find the virus that causes COVID-19 in your sample. A test may show negative when you do actually have the virus. This can happen when the virus is in the early stages of infection, before you feel illness symptoms.    If you have symptoms   Stay home and away from others (self-isolate) until you meet ALL of the guidelines below:    You ve had no fever--and no medicine that reduces fever--for 3 full days (72 hours). And      Your other symptoms have gotten better. For example, your cough or breathing has improved. And     At least 10 days have passed since your symptoms started.    During this time:    Stay home. Don t go to work, school or anywhere else.     Stay in your own room, including for meals. Use your own bathroom if you can.    Stay away from others in your home. No hugging, kissing or shaking hands. No visitors.    Clean  high touch  surfaces often (doorknobs, counters, handles, etc.). Use a household cleaning spray or wipes. You can find a full list on the EPA website at www.epa.gov/pesticide-registration/list-n-disinfectants-use-against-sars-cov-2.    Cover your mouth and nose with a mask, tissue or washcloth to avoid spreading germs.    Wash your hands and face often with soap and water.    Going back to work  Check with your employer for any guidelines to follow for going back to work.    Employers: This document serves as formal notice that your employee tested negative for COVID-19, as of the testing date shown above.

## 2020-07-08 LAB
SARS-COV-2 RNA SPEC QL NAA+PROBE: NOT DETECTED
SPECIMEN SOURCE: NORMAL

## 2020-08-12 DIAGNOSIS — E22.1 HYPERPROLACTINEMIA (H): ICD-10-CM

## 2020-08-12 LAB
FSH SERPL-ACNC: 5.7 IU/L
PROLACTIN SERPL-MCNC: 17 UG/L (ref 3–27)

## 2020-08-12 PROCEDURE — 83001 ASSAY OF GONADOTROPIN (FSH): CPT | Performed by: OBSTETRICS & GYNECOLOGY

## 2020-08-12 PROCEDURE — 84146 ASSAY OF PROLACTIN: CPT | Performed by: OBSTETRICS & GYNECOLOGY

## 2020-08-12 PROCEDURE — 36415 COLL VENOUS BLD VENIPUNCTURE: CPT | Performed by: OBSTETRICS & GYNECOLOGY

## 2020-08-12 PROCEDURE — 84443 ASSAY THYROID STIM HORMONE: CPT | Performed by: OBSTETRICS & GYNECOLOGY

## 2020-08-13 LAB — TSH SERPL DL<=0.005 MIU/L-ACNC: 1.73 MU/L (ref 0.4–4)

## 2020-11-20 ENCOUNTER — IMMUNIZATION (OUTPATIENT)
Dept: FAMILY MEDICINE | Facility: CLINIC | Age: 30
End: 2020-11-20
Payer: COMMERCIAL

## 2020-11-20 DIAGNOSIS — Z23 NEED FOR PROPHYLACTIC VACCINATION AND INOCULATION AGAINST INFLUENZA: Primary | ICD-10-CM

## 2020-11-20 PROCEDURE — 90471 IMMUNIZATION ADMIN: CPT

## 2020-11-20 PROCEDURE — 99207 PR NO CHARGE NURSE ONLY: CPT

## 2020-11-20 PROCEDURE — 90686 IIV4 VACC NO PRSV 0.5 ML IM: CPT

## 2020-12-30 ENCOUNTER — OFFICE VISIT (OUTPATIENT)
Dept: FAMILY MEDICINE | Facility: CLINIC | Age: 30
End: 2020-12-30
Payer: COMMERCIAL

## 2020-12-30 VITALS
HEIGHT: 58 IN | DIASTOLIC BLOOD PRESSURE: 58 MMHG | SYSTOLIC BLOOD PRESSURE: 90 MMHG | HEART RATE: 60 BPM | TEMPERATURE: 97.9 F | RESPIRATION RATE: 14 BRPM | BODY MASS INDEX: 31.12 KG/M2 | WEIGHT: 148.25 LBS

## 2020-12-30 DIAGNOSIS — L40.9 PSORIASIS: Primary | ICD-10-CM

## 2020-12-30 PROCEDURE — 99213 OFFICE O/P EST LOW 20 MIN: CPT | Performed by: NURSE PRACTITIONER

## 2020-12-30 RX ORDER — KETOCONAZOLE 20 MG/ML
SHAMPOO TOPICAL
Status: CANCELLED | OUTPATIENT
Start: 2020-12-30

## 2020-12-30 RX ORDER — TRIAMCINOLONE ACETONIDE 1 MG/G
CREAM TOPICAL
Qty: 30 G | Refills: 2 | Status: SHIPPED | OUTPATIENT
Start: 2020-12-30

## 2020-12-30 RX ORDER — CLOBETASOL PROPIONATE 0.05 G/100ML
SHAMPOO TOPICAL
Qty: 118 ML | Refills: 2 | Status: SHIPPED | OUTPATIENT
Start: 2020-12-30 | End: 2023-08-14

## 2020-12-30 ASSESSMENT — ENCOUNTER SYMPTOMS
CONSTIPATION: 0
COUGH: 0
NAUSEA: 0
SINUS PRESSURE: 0
CHILLS: 0
SHORTNESS OF BREATH: 0
HEADACHES: 0
FATIGUE: 0
LIGHT-HEADEDNESS: 0
DIAPHORESIS: 0
FEVER: 0
WHEEZING: 0
SORE THROAT: 0
RHINORRHEA: 0
EYE DISCHARGE: 0
DIZZINESS: 0
DIARRHEA: 0
EYE ITCHING: 0

## 2020-12-30 ASSESSMENT — MIFFLIN-ST. JEOR: SCORE: 1287.21

## 2020-12-30 ASSESSMENT — PAIN SCALES - GENERAL: PAINLEVEL: NO PAIN (0)

## 2020-12-30 NOTE — PROGRESS NOTES
"Subjective     Anu Davis is a 30 year old female who presents to clinic today for the following health issues:    HPI       - Possible psoriasis. She notes dry, red and itching skin on scalp and around ears. No drainage, burning or pain. She is using OTC baby acne lotion at home with minimal improvement.        Has always struggled with dandruff and itching around ears. Is having a lot of redness around ears and a lot more flaking. Itches really bad. Has been trying to make sure is getting all shampoo out of hair. Washes hair every other day, because is so oily. Currently uses pantene. No other areas to skin at all that has noticed. In the past has been very consistent, did not fluctuate with seasons.     Review of Systems   Constitutional: Negative for chills, diaphoresis, fatigue and fever.   HENT: Negative for ear discharge, ear pain, hearing loss, rhinorrhea, sinus pressure and sore throat.    Eyes: Negative for discharge and itching.   Respiratory: Negative for cough, shortness of breath and wheezing.    Gastrointestinal: Negative for constipation, diarrhea and nausea.   Skin: Negative for rash.        Scalp is itchy and flaking.  Dry, flaking scaly areas behind the ears   Neurological: Negative for dizziness, light-headedness and headaches.          Objective    BP 90/58   Pulse 60   Temp 97.9  F (36.6  C) (Tympanic)   Resp 14   Ht 1.473 m (4' 10\")   Wt 67.2 kg (148 lb 4 oz)   BMI 30.98 kg/m    Body mass index is 30.98 kg/m .  Physical Exam  Constitutional:       Appearance: She is well-developed.   Cardiovascular:      Rate and Rhythm: Normal rate and regular rhythm.   Pulmonary:      Effort: Pulmonary effort is normal.      Breath sounds: Normal breath sounds.   Skin:     General: Skin is warm.      Comments: See uploaded pictures.  Posterior bilateral ears erythemic, flaking, dry.  Scattered areas to scalp as well.   Neurological:      Mental Status: She is alert.         "                 Psoriasis  Educated on use of cream and shampoo.  We will plan to refer to dermatology for further management.  - triamcinolone (KENALOG) 0.1 % external cream; Apply to ears twice per day as needed  - DERMATOLOGY ADULT REFERRAL; Future  - clobetasol propionate (CLOBEX) 0.05 % external shampoo; Apply topically to scalp three times per week

## 2020-12-31 ENCOUNTER — OFFICE VISIT (OUTPATIENT)
Dept: DERMATOLOGY | Facility: CLINIC | Age: 30
End: 2020-12-31
Attending: NURSE PRACTITIONER
Payer: COMMERCIAL

## 2020-12-31 VITALS — HEART RATE: 81 BPM | SYSTOLIC BLOOD PRESSURE: 116 MMHG | OXYGEN SATURATION: 99 % | DIASTOLIC BLOOD PRESSURE: 64 MMHG

## 2020-12-31 DIAGNOSIS — L40.9 PSORIASIS: ICD-10-CM

## 2020-12-31 PROCEDURE — 99203 OFFICE O/P NEW LOW 30 MIN: CPT | Performed by: PHYSICIAN ASSISTANT

## 2020-12-31 RX ORDER — FLUOCINONIDE TOPICAL SOLUTION USP, 0.05% 0.5 MG/ML
SOLUTION TOPICAL
Qty: 50 ML | Refills: 3 | Status: SHIPPED | OUTPATIENT
Start: 2020-12-31 | End: 2022-02-24

## 2020-12-31 RX ORDER — KETOCONAZOLE 20 MG/ML
SHAMPOO TOPICAL
Qty: 120 ML | Refills: 11 | Status: SHIPPED | OUTPATIENT
Start: 2020-12-31 | End: 2022-02-24

## 2020-12-31 NOTE — NURSING NOTE
"Initial /64 (BP Location: Left arm)   Pulse 81   SpO2 99%  Estimated body mass index is 30.98 kg/m  as calculated from the following:    Height as of 12/30/20: 1.473 m (4' 10\").    Weight as of 12/30/20: 67.2 kg (148 lb 4 oz). .      "

## 2020-12-31 NOTE — LETTER
12/31/2020         RE: Anu Davis  [address]  [facility] [address]        Dear Colleague,    Thank you for referring your patient, Anu Davis, to the [facility]. Please see a copy of my visit note below.    HPI:   Chief complaints: Anu Davis is a 30 year old female who presents for evaluation of possible psoriasis on the scalp. She has had itching and flaking on the scalp for many years, worst over the past couple of months. She notices it mainly in the back of the scalp and around her ears. She has tried OTC Head and Shoulders but this did not help at all. No fhx of psoriasis.     Shx: . Lives in [place].       Review Of Systems  Eyes: negative  Ears/Nose/Throat: negative  Respiratory: No shortness of breath, dyspnea on exertion, cough, or hemoptysis  Cardiovascular: negative  Gastrointestinal: negative  Genitourinary: negative  Musculoskeletal: negative  Neurologic: negative  Psychiatric: negative  Skin: positive for flaking on the scalp      PHYSICAL EXAM:    /64 (BP Location: Left arm)   Pulse 81   SpO2 99%   Skin exam performed as follows: Type 2 skin. Mood appropriate  Alert and Oriented X 3. Well developed, well nourished in no distress.  General appearance: Normal  Head including face: Normal  Eyes: conjunctiva and lids: Normal  Mouth: Lips, teeth, gums: Normal  Neck: Normal  Chest-breast/axillae: Normal  Back: Normal  Spleen and liver: Normal  Cardiovascular: Exam of peripheral vascular system by observation for swelling, varicosities, edema: Normal  Genitalia: groin, buttocks: Normal  Extremities: digits/nails (clubbing): Normal  Eccrine and Apocrine glands: Normal  Right upper extremity: Normal  Left upper extremity: Normal  Right lower extremity: Normal  Left lower extremity: Normal  Skin: Scalp and body hair: See below    1. 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 Psoriasis - advised on  diagnosis and treatment options  --Start ketoconazole shampoo daily as needed  --Start lidex BID x 1-2 weeks then PRN          Follow-up: 4 weeks if needed/yearly if doing well  CC:   Scribed By: Reyna Huitron, MS, PARodolfoC          Again, thank you for allowing me to participate in the care of your patient.        Sincerely,        Reyna Huitron PA-C

## 2020-12-31 NOTE — PROGRESS NOTES
HPI:   Chief complaints: Anu Davis is a 30 year old female who presents for evaluation of possible psoriasis on the scalp. She has had itching and flaking on the scalp for many years, worst over the past couple of months. She notices it mainly in the back of the scalp and around her ears. She has tried OTC Head and Shoulders but this did not help at all. No fhx of psoriasis.     Shx: . Lives in [place].       Review Of Systems  Eyes: negative  Ears/Nose/Throat: negative  Respiratory: No shortness of breath, dyspnea on exertion, cough, or hemoptysis  Cardiovascular: negative  Gastrointestinal: negative  Genitourinary: negative  Musculoskeletal: negative  Neurologic: negative  Psychiatric: negative  Skin: positive for flaking on the scalp      PHYSICAL EXAM:    /64 (BP Location: Left arm)   Pulse 81   SpO2 99%   Skin exam performed as follows: Type 2 skin. Mood appropriate  Alert and Oriented X 3. Well developed, well nourished in no distress.  General appearance: Normal  Head including face: Normal  Eyes: conjunctiva and lids: Normal  Mouth: Lips, teeth, gums: Normal  Neck: Normal  Chest-breast/axillae: Normal  Back: Normal  Spleen and liver: Normal  Cardiovascular: Exam of peripheral vascular system by observation for swelling, varicosities, edema: Normal  Genitalia: groin, buttocks: Normal  Extremities: digits/nails (clubbing): Normal  Eccrine and Apocrine glands: Normal  Right upper extremity: Normal  Left upper extremity: Normal  Right lower extremity: Normal  Left lower extremity: Normal  Skin: Scalp and body hair: See below    1. 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 Psoriasis - advised on diagnosis and treatment options  --Start ketoconazole shampoo daily as needed  --Start lidex BID x 1-2 weeks then PRN          Follow-up: 4 weeks if needed/yearly if doing well  CC:   Scribed By: Reyna Huitron, MS, PA-C

## 2021-04-25 ENCOUNTER — HEALTH MAINTENANCE LETTER (OUTPATIENT)
Age: 31
End: 2021-04-25

## 2021-06-08 ENCOUNTER — VIRTUAL VISIT (OUTPATIENT)
Dept: OBGYN | Facility: CLINIC | Age: 31
End: 2021-06-08
Payer: COMMERCIAL

## 2021-06-08 DIAGNOSIS — N92.6 IRREGULAR MENSES: Primary | ICD-10-CM

## 2021-06-08 PROBLEM — L40.9 PSORIASIS: Status: ACTIVE | Noted: 2021-06-08

## 2021-06-08 PROCEDURE — 99213 OFFICE O/P EST LOW 20 MIN: CPT | Mod: 95 | Performed by: OBSTETRICS & GYNECOLOGY

## 2021-06-08 NOTE — PROGRESS NOTES
Saige is a 30 year old who is being evaluated via a billable telephone visit.      What phone number would you like to be contacted at? [phone]  How would you like to obtain your AVS? Rebecca     CC: trying to get pregnant for couple months, irregular cycles, fertility angie. 30-36 days apart.    Anu is a 30 year old  here for follow up of infertlity, irregular menses.  Have been trying to conceive for past few months.  Has not used OPK testing.  Tracking with an angie.  Unable to tell if she's ovulating, but she does have some breast tenderness. Didn't have any difficulty with conceiving her first child.     Hx prolactinoma--last prolactin WNL.  Had been on dostinex in the past    ROS: Ten point review of systems was reviewed and negative except the above.    PMH: Her past medical, surgical, and obstetric histories were reviewed and are documented in their appropriate chart areas.    ALL/Meds: Her medication and allergy histories were reviewed and are documented in their appropriate chart areas.    Social History     Tobacco Use     Smoking status: Never Smoker     Smokeless tobacco: Never Used   Substance Use Topics     Alcohol use: Yes     Comment: rare     Drug use: No        FH: Her family history was reviewed and documented in its appropriate chart area.         Objective    Vitals - Patient Reported  Weight (Patient Reported): 65.8 kg (145 lb)        Physical Exam   healthy, alert and no distress  PSYCH: Alert and oriented times 3; coherent speech, normal   rate and volume, able to articulate logical thoughts, able   to abstract reason, no tangential thoughts, no hallucinations   or delusions  Her affect is normal and pleasant  RESP: No cough, no audible wheezing, able to talk in full sentences  Remainder of exam unable to be completed due to telephone visits      A/P 30 year old  here for     ICD-10-CM    1. Irregular menses  N92.6 Prolactin     Progesterone        1. Reviewed that her  ovulation is the most likely culprit of her irregular menses and difficulty conceiving.  Discussed that infertility is defined as 1 year without contraception and no conception, so she is technically not at that point yet.  Discussed assessments of ovulation and consideration for ovulation induction if her menses are that irregular.  Reviewed having intercourse regularly to increase chances of sperm presence when she does ovulate.      Will hold hold aggressive fertility testing for now, and assess her day 21 progesterone and prolactin level (prior hx of elevation).      Ifrah Hernandez M.D.      Phone call duration: 22 minutes    25 minutes total spent on the date of the encounter doing chart review, patient visit and documentation

## 2021-06-21 DIAGNOSIS — N92.6 IRREGULAR MENSES: ICD-10-CM

## 2021-06-21 LAB
PROGEST SERPL-MCNC: 0.7 NG/ML
PROLACTIN SERPL-MCNC: 68 UG/L (ref 3–27)

## 2021-06-21 PROCEDURE — 36415 COLL VENOUS BLD VENIPUNCTURE: CPT | Performed by: OBSTETRICS & GYNECOLOGY

## 2021-06-21 PROCEDURE — 84144 ASSAY OF PROGESTERONE: CPT | Performed by: OBSTETRICS & GYNECOLOGY

## 2021-06-21 PROCEDURE — 84146 ASSAY OF PROLACTIN: CPT | Performed by: OBSTETRICS & GYNECOLOGY

## 2021-06-23 DIAGNOSIS — E22.1 HYPERPROLACTINEMIA (H): Primary | ICD-10-CM

## 2021-06-23 RX ORDER — CABERGOLINE 0.5 MG/1
0.25 TABLET ORAL
Qty: 12 TABLET | Refills: 0 | Status: SHIPPED | OUTPATIENT
Start: 2021-06-24 | End: 2021-12-08

## 2021-06-23 NOTE — RESULT ENCOUNTER NOTE
Pt notified of below.  Pt reports understanding.  Patient would like prescription to go to the [facility] in [place].    Patient inquiring about a plan for her low progesterone level and lack of ovulation?  Will ovulation come around when the prolactin level is in a more normal range?.    Analilia Ziegler   [facility]  RN

## 2021-07-09 DIAGNOSIS — E22.1 HYPERPROLACTINEMIA (H): Primary | ICD-10-CM

## 2021-07-27 ENCOUNTER — NURSE TRIAGE (OUTPATIENT)
Dept: NURSING | Facility: CLINIC | Age: 31
End: 2021-07-27

## 2021-07-27 NOTE — TELEPHONE ENCOUNTER
"    Reason for Disposition    Periods with > 6 soaked pads or tampons per day    Additional Information    Negative: SEVERE abdominal pain    Negative: SEVERE dizziness (e.g., unable to stand, requires support to walk, feels like passing out now)    Negative: SEVERE vaginal bleeding (e.g., soaking 2 pads or tampons per hour and present 2 or more hours; 1 menstrual cup every 2 hours)    Negative: Patient sounds very sick or weak to the triager    Negative: MODERATE vaginal bleeding (i.e., soaking 1 pad or tampon per hour and present > 6 hours; 1 menstrual cup every 6 hours)    Negative: [1] Constant abdominal pain AND [2] present > 2 hours    Negative: Pale skin (pallor) of new onset or worsening    Negative: Passed tissue (e.g., gray-white)    Negative: Taking Coumadin (warfarin) or other strong blood thinner, or known bleeding disorder (e.g., thrombocytopenia)    Negative: [1] Skin bruises or nosebleed AND [2] not caused by an injury    Negative: [1] Periods with > 6 soaked pads or tampons per day AND [2] last > 7 days    Negative: [1] Bleeding or spotting after procedure (e.g., biopsy) or pelvic examination (e.g., pap smear) AND [2] lasts > 7 days    Protocols used: VAGINAL BLEEDING - [id number]    \"I started on   cabergoline (DOSTINEX) 0.5 MG tablet 12 tablet 0 6/24/2021  --   Sig - Route: Take 0.5 tablets (0.25 mg) by mouth twice a week If too many side effects, it can be placed in the vagina instead. - Oral     \"I am on my period. I feel like this is the first normal period, but I have noticed I am bleeding more. I am changing a tampon every 2 hours. The cramping is normal, no other sx.\"  Triaged, gave home care advice and when to seek emergency care if needed   Also advised to call PCP in am 7/28 to discuss  Call back if needed.  Salome Camilo RN [facility]      "

## 2021-08-28 ENCOUNTER — LAB (OUTPATIENT)
Dept: LAB | Facility: CLINIC | Age: 31
End: 2021-08-28
Payer: COMMERCIAL

## 2021-08-28 DIAGNOSIS — Z32.01 PREGNANCY TEST POSITIVE: ICD-10-CM

## 2021-08-28 DIAGNOSIS — E22.1 HYPERPROLACTINEMIA (H): ICD-10-CM

## 2021-08-28 DIAGNOSIS — N92.6 IRREGULAR MENSES: ICD-10-CM

## 2021-08-28 PROCEDURE — 36415 COLL VENOUS BLD VENIPUNCTURE: CPT

## 2021-08-28 PROCEDURE — 84702 CHORIONIC GONADOTROPIN TEST: CPT

## 2021-08-30 LAB — B-HCG SERPL-ACNC: 488 IU/L (ref 0–5)

## 2021-09-01 ENCOUNTER — NURSE TRIAGE (OUTPATIENT)
Dept: NURSING | Facility: CLINIC | Age: 31
End: 2021-09-01

## 2021-09-01 NOTE — TELEPHONE ENCOUNTER
See pt's "[employer] message from yesterday/today.  Pt questions if she can wait until Friday to have her HCG level rechecked.  Pt asymptomatic.  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      This RN will add to MyChart encounter.     COVID 19 Nurse Triage Plan/Patient Instructions    Please be aware that novel coronavirus (COVID-19) may be circulating in the community. If you develop symptoms such as fever, cough, or SOB or if you have concerns about the presence of another infection including coronavirus (COVID-19), please contact your health care provider or visit [email or web address].     Disposition/Instructions    Home care recommended. Follow home care protocol based instructions.    Thank you for taking steps to prevent the spread of this virus.  o Limit your contact with others.  o Wear a simple mask to cover your cough.  o Wash your hands well and often.    Resources    [facility]: About COVID-19: [email or web address]    CDC: What to Do If You're Sick: www.cdc.gov/coronavirus/2019-ncov/about/steps-when-sick.html    CDC: Ending Home Isolation: www.cdc.gov/coronavirus/2019-ncov/hcp/disposition-in-home-patients.html     CDC: Caring for Someone: www.cdc.gov/coronavirus/2019-ncov/if-you-are-sick/care-for-someone.html     [facility]: Interim Guidance for Hospital Discharge to Home: www.health.[facility].mn.us/diseases/coronavirus/hcp/hospdischarge.pdf    [facility] clinical trials (COVID-19 research studies): [email or web address]/umn-[email or web address]-trials     Below are the COVID-19 hotlines at the [facility] of Health ([facility]). Interpreters are available.   o For health questions: Call [phone] or 1-710.241.8273 (7 a.m. to 7 p.m.)  o For questions about schools and childcare: Call [phone] or [phone] (7 a.m. to 7 p.m.)                 Solange Shah RN/FNA      Reason for Disposition    [1] Caller requesting NON-URGENT  health information AND [2] PCP's office is the best resource    Additional Information    Negative: RN needs further essential information from caller in order to complete triage    Negative: Requesting regular office appointment    Protocols used: INFORMATION ONLY CALL - NO TRIAGE-A-AH

## 2021-09-03 ENCOUNTER — LAB (OUTPATIENT)
Dept: LAB | Facility: CLINIC | Age: 31
End: 2021-09-03
Payer: COMMERCIAL

## 2021-09-03 DIAGNOSIS — N92.6 IRREGULAR MENSES: ICD-10-CM

## 2021-09-03 DIAGNOSIS — E22.1 HYPERPROLACTINEMIA (H): ICD-10-CM

## 2021-09-03 DIAGNOSIS — Z32.01 PREGNANCY TEST POSITIVE: ICD-10-CM

## 2021-09-03 LAB — B-HCG SERPL-ACNC: 1783 IU/L (ref 0–5)

## 2021-09-03 PROCEDURE — 84702 CHORIONIC GONADOTROPIN TEST: CPT

## 2021-09-03 PROCEDURE — 36415 COLL VENOUS BLD VENIPUNCTURE: CPT

## 2021-09-07 ENCOUNTER — OFFICE VISIT (OUTPATIENT)
Dept: OBGYN | Facility: CLINIC | Age: 31
End: 2021-09-07
Payer: COMMERCIAL

## 2021-09-07 VITALS
WEIGHT: 136.9 LBS | HEIGHT: 59 IN | HEART RATE: 111 BPM | SYSTOLIC BLOOD PRESSURE: 121 MMHG | BODY MASS INDEX: 27.6 KG/M2 | DIASTOLIC BLOOD PRESSURE: 73 MMHG | TEMPERATURE: 99.8 F

## 2021-09-07 DIAGNOSIS — Z32.01 PREGNANCY TEST POSITIVE: Primary | ICD-10-CM

## 2021-09-07 PROCEDURE — 99213 OFFICE O/P EST LOW 20 MIN: CPT | Performed by: OBSTETRICS & GYNECOLOGY

## 2021-09-07 PROCEDURE — 76817 TRANSVAGINAL US OBSTETRIC: CPT | Performed by: OBSTETRICS & GYNECOLOGY

## 2021-09-07 RX ORDER — PRENATAL VIT/IRON FUM/FOLIC AC 27MG-0.8MG
1 TABLET ORAL DAILY
COMMUNITY
Start: 2022-05-01 | End: 2023-04-20

## 2021-09-07 ASSESSMENT — MIFFLIN-ST. JEOR: SCORE: 1251.6

## 2021-09-07 NOTE — PROGRESS NOTES
[facility]    Confirmation of pregnancy Visit Note    CC: New OB     Subjective:    Anu is a 30 year old  at 6w1d by LMP who presents for a pregnancy viability check. We have been trending beta hcg, which has risen from 488 on 21 to 1,783 on 9/3/21. She would like an early ultrasound to assess viability as she had some vaginal spotting last week for about two days. She has no other complaints at this time.       OB History    Para Term  AB Living   2 1 1 0 0 1   SAB TAB Ectopic Multiple Live Births   0 0 0 0 1      # Outcome Date GA Lbr Talib/2nd Weight Sex Delivery Anes PTL Lv   2 Current            1 Term 18 40w3d 12:45 / 04:16 3.07 kg (6 lb 12.3 oz) F CS-LTranv EPI N GENE      Birth Comments: NP called to the delivery for arrest of decent and fetal tachycardia.  After some pushing it was decided by the OBGYN to go to  secondary to arrest of decent.  NP scrubbed in and assisted in the .  Infant delivered with a nuchal cord x1.  Infant cried right away after delivery.  Infant brought to the warmer by the RN for assessment and measurements. Apgars were 9/9.  No further intervention required.  Infant to mother for skin to skin and bonding.        Name: Christine      Apgar1: 9  Apgar5: 9       Past Medical History:   Diagnosis Date     Chickenpox      Ganglion of joint 2011     Tenosynovitis        Past Surgical History:   Procedure Laterality Date      SECTION N/A 2018    Procedure:  SECTION;;  Surgeon: Wang Pelletier MD;  Location: [facility] OR     WRIST SURGERY Right     ganglion removal       Current Outpatient Medications   Medication Sig Dispense Refill     clobetasol propionate (CLOBEX) 0.05 % external shampoo Apply topically to scalp three times per week 118 mL 2     Prenatal Vit-Fe Fumarate-FA (PRENATAL MULTIVITAMIN W/IRON) 27-0.8 MG tablet Take 1 tablet by mouth daily       triamcinolone  "(KENALOG) 0.1 % external cream Apply to ears twice per day as needed 30 g 2     cabergoline (DOSTINEX) 0.5 MG tablet Take 0.5 tablets (0.25 mg) by mouth twice a week If too many side effects, it can be placed in the vagina instead. (Patient not taking: Reported on 9/7/2021) 12 tablet 0     fluocinonide (LIDEX) 0.05 % external solution Apply to scalp BID x 1-2 weeks PRN (Patient not taking: Reported on 6/8/2021) 50 mL 3     ketoconazole (NIZORAL) 2 % external shampoo Use daily as needed (Patient not taking: Reported on 6/8/2021) 120 mL 11       Family History   Problem Relation Age of Onset     Depression Mother         situational     Medical History Unknown Mother         mother adopted     Hypertension Father      Diverticulitis Father      Colon Polyps Father      Emphysema Paternal Grandmother        Social History     Tobacco Use     Smoking status: Never Smoker     Smokeless tobacco: Never Used   Substance Use Topics     Alcohol use: Yes     Comment: rare       ROS: A 10 pt ROS was completed and found to be negative unless mentioned in the HPI.     Objective:    /73 (BP Location: Left arm, Cuff Size: Adult Regular)   Pulse 111   Temp 99.8  F (37.7  C)   Ht 1.499 m (4' 11\")   Wt 62.1 kg (136 lb 14.4 oz)   LMP 07/26/2021   BMI 27.65 kg/m      Estimated body mass index is 27.65 kg/m  as calculated from the following:    Height as of this encounter: 1.499 m (4' 11\").    Weight as of this encounter: 62.1 kg (136 lb 14.4 oz).    General appearance: well-hydrated, A&O x 3, no apparent distress  Lungs: Equal expansion bilaterally, no accessory muscle use  Heart: No heaves or thrills. No peripheral varicosities  Constitutional: See vitals  Extremities: no edema    Bedside Ultrasound    Transvaginal ultrasound was performed. An intrauterine fluid collection is seen, consistent with possible gestational sac vs. pseudogestational sac, measuring 0.85cm x 0.45cm x 0.74cm. No yolk sac or fetal pole visualized. " Bilateral adnexa normal appearing without mass. Left ovary with suspected corpus luteum.       Assessment and Plan:     Encounter Diagnosis   Name Primary?     Pregnancy test positive Yes       30 year old  at 6w1d by LMP who presents for viability check. Discussed ultrasound findings with patient, likely early normal pregnancy but unable to confirm at early gestational age. Possibility of non viable pregnancy. Patient has a history of prolactinoma and possibly has irregular ovulation, gestational age might not be accurate. Would recommend follow up ultrasound in 2 weeks. Patient would also elect to continue to monitor beta hcg.     Return to clinic in 2 weeks.     Monik Turcios,

## 2021-09-13 ENCOUNTER — LAB (OUTPATIENT)
Dept: LAB | Facility: CLINIC | Age: 31
End: 2021-09-13
Payer: COMMERCIAL

## 2021-09-13 DIAGNOSIS — N92.6 IRREGULAR MENSES: ICD-10-CM

## 2021-09-13 DIAGNOSIS — E22.1 HYPERPROLACTINEMIA (H): ICD-10-CM

## 2021-09-13 DIAGNOSIS — Z32.01 PREGNANCY TEST POSITIVE: ICD-10-CM

## 2021-09-13 LAB — B-HCG SERPL-ACNC: ABNORMAL IU/L (ref 0–5)

## 2021-09-13 PROCEDURE — 36415 COLL VENOUS BLD VENIPUNCTURE: CPT

## 2021-09-13 PROCEDURE — 84702 CHORIONIC GONADOTROPIN TEST: CPT

## 2021-09-15 ENCOUNTER — LAB (OUTPATIENT)
Dept: LAB | Facility: CLINIC | Age: 31
End: 2021-09-15
Payer: COMMERCIAL

## 2021-09-15 DIAGNOSIS — Z32.01 PREGNANCY TEST POSITIVE: ICD-10-CM

## 2021-09-15 DIAGNOSIS — E22.1 HYPERPROLACTINEMIA (H): ICD-10-CM

## 2021-09-15 DIAGNOSIS — N92.6 IRREGULAR MENSES: ICD-10-CM

## 2021-09-15 LAB — B-HCG SERPL-ACNC: ABNORMAL IU/L (ref 0–5)

## 2021-09-15 PROCEDURE — 84702 CHORIONIC GONADOTROPIN TEST: CPT

## 2021-09-15 PROCEDURE — 36415 COLL VENOUS BLD VENIPUNCTURE: CPT

## 2021-10-01 ENCOUNTER — PRENATAL OFFICE VISIT (OUTPATIENT)
Dept: OBGYN | Facility: CLINIC | Age: 31
End: 2021-10-01
Payer: COMMERCIAL

## 2021-10-01 VITALS
BODY MASS INDEX: 29.6 KG/M2 | WEIGHT: 141 LBS | TEMPERATURE: 98.9 F | SYSTOLIC BLOOD PRESSURE: 122 MMHG | HEIGHT: 58 IN | RESPIRATION RATE: 18 BRPM | DIASTOLIC BLOOD PRESSURE: 67 MMHG | HEART RATE: 81 BPM

## 2021-10-01 DIAGNOSIS — O02.1 MISSED ABORTION: Primary | ICD-10-CM

## 2021-10-01 PROCEDURE — 76817 TRANSVAGINAL US OBSTETRIC: CPT | Performed by: OBSTETRICS & GYNECOLOGY

## 2021-10-01 PROCEDURE — 99213 OFFICE O/P EST LOW 20 MIN: CPT | Performed by: OBSTETRICS & GYNECOLOGY

## 2021-10-01 RX ORDER — HYDROCODONE BITARTRATE AND ACETAMINOPHEN 5; 325 MG/1; MG/1
1-2 TABLET ORAL EVERY 6 HOURS PRN
Qty: 10 TABLET | Refills: 0 | Status: SHIPPED | OUTPATIENT
Start: 2021-10-01 | End: 2021-11-02

## 2021-10-01 RX ORDER — MISOPROSTOL 200 UG/1
800 TABLET ORAL ONCE
Qty: 4 TABLET | Refills: 0 | Status: SHIPPED | OUTPATIENT
Start: 2021-10-01 | End: 2021-10-01

## 2021-10-01 ASSESSMENT — MIFFLIN-ST. JEOR: SCORE: 1254.32

## 2021-10-01 NOTE — PROGRESS NOTES
Anu is a 30 year old   female who presents for early ob evaluation; she had a small amount of spotting more than 2 weeks ago, had serial HCG levels done and an ultrasound 21 by Dr. Turcois at when she should have been 6w1d; at that time, an intrauterine gestational sac was seen but could not confirm viable iUP  She presents today for repeat evaluation  No further bleeding  Blood type O Pos.    Patient Active Problem List    Diagnosis Date Noted     Psoriasis 2021     Priority: Medium     Prolactinoma (H) 2019     Priority: Medium     Irregular menses 2018     Priority: Medium     S/P  2018     Priority: Medium       All systems were reviewed and pertinent information in noted in subjective/HPI.    Past Medical History:   Diagnosis Date     Chickenpox      Ganglion of joint 2011     Tenosynovitis        Past Surgical History:   Procedure Laterality Date      SECTION N/A 2018    Procedure:  SECTION;;  Surgeon: Wang Pelletier MD;  Location: [facility] OR     WRIST SURGERY Right     ganglion removal         Current Outpatient Medications:      clobetasol propionate (CLOBEX) 0.05 % external shampoo, Apply topically to scalp three times per week, Disp: 118 mL, Rfl: 2     fluocinonide (LIDEX) 0.05 % external solution, Apply to scalp BID x 1-2 weeks PRN, Disp: 50 mL, Rfl: 3     HYDROcodone-acetaminophen (NORCO) 5-325 MG tablet, Take 1-2 tablets by mouth every 6 hours as needed for severe pain, Disp: 10 tablet, Rfl: 0     ketoconazole (NIZORAL) 2 % external shampoo, Use daily as needed, Disp: 120 mL, Rfl: 11     misoprostol (CYTOTEC) 200 MCG tablet, Take 4 tablets (800 mcg) by mouth once for 1 dose, Disp: 4 tablet, Rfl: 0     Prenatal Vit-Fe Fumarate-FA (PRENATAL MULTIVITAMIN W/IRON) 27-0.8 MG tablet, Take 1 tablet by mouth daily, Disp: , Rfl:      triamcinolone (KENALOG) 0.1 % external cream, Apply to ears twice per day as needed, Disp: 30 g,  Rfl: 2     cabergoline (DOSTINEX) 0.5 MG tablet, Take 0.5 tablets (0.25 mg) by mouth twice a week If too many side effects, it can be placed in the vagina instead. (Patient not taking: Reported on 9/7/2021), Disp: 12 tablet, Rfl: 0    ALLERGIES:  Patient has no known allergies.    Social History     Socioeconomic History     Marital status:      Spouse name: None     Number of children: None     Years of education: None     Highest education level: None   Occupational History     None   Tobacco Use     Smoking status: Never Smoker     Smokeless tobacco: Never Used   Vaping Use     Vaping Use: Never used   Substance and Sexual Activity     Alcohol use: Yes     Comment: rare     Drug use: No     Sexual activity: Yes     Partners: Male     Comment:  since 2016   Other Topics Concern     Parent/sibling w/ CABG, MI or angioplasty before 65F 55M? Not Asked   Social History Narrative     for grades 2-5 in [place]    Daughter born approx 4/2018     Social Determinants of Health     Financial Resource Strain:      Difficulty of Paying Living Expenses:    Food Insecurity:      Worried About Running Out of Food in the Last Year:      Ran Out of Food in the Last Year:    Transportation Needs:      Lack of Transportation (Medical):      Lack of Transportation (Non-Medical):    Physical Activity:      Days of Exercise per Week:      Minutes of Exercise per Session:    Stress:      Feeling of Stress :    Social Connections:      Frequency of Communication with Friends and Family:      Frequency of Social Gatherings with Friends and Family:      Attends Restoration Services:      Active Member of Clubs or Organizations:      Attends Club or Organization Meetings:      Marital Status:    Intimate Partner Violence:      Fear of Current or Ex-Partner:      Emotionally Abused:      Physically Abused:      Sexually Abused:        Family History   Problem Relation Age of Onset     Depression Mother   "       situational     Medical History Unknown Mother         mother adopted     Hypertension Father      Diverticulitis Father      Colon Polyps Father      Emphysema Paternal Grandmother        OBJECTIVE:  Vitals: /67 (BP Location: Right arm, Patient Position: Chair, Cuff Size: Adult Regular)   Pulse 81   Temp 98.9  F (37.2  C) (Tympanic)   Resp 18   Ht 1.473 m (4' 10\")   Wt 64 kg (141 lb)   LMP 2021   Breastfeeding No   BMI 29.47 kg/m   BMI= Body mass index is 29.47 kg/m .   Patient's last menstrual period was 2021.     GENERAL APPEARANCE: healthy, alert and no distress  ABDOMEN:  soft, nontender, no hepato-splenomegaly or hernias    .Transvaginal ultrasound was performed. A non viable intrauterine pregnancy  Gestational sac was seen.  No fetal pole could be visualized, c/w blighted ovum/missed   Fetal heart motion was not visualized.  No free fluid; no adnexal masses              ASSESSMENT:      ICD-10-CM    1. Missed   O02.1 misoprostol (CYTOTEC) 200 MCG tablet     HYDROcodone-acetaminophen (NORCO) 5-325 MG tablet       PLAN:  I discussed with Saige that the findings representative definitive dx of miscarriage/blighted pregnancy;  We disussed options of expectant management, misoprostol to try to force expulsion of tissue and d & C  She would like to try the Misoprostol 800mcg PV  Advised to go to ER if excessive bleeding/pain  Call if no results within 72 hours.  Follow-up 1 weeks  Blood type O pos  Asia Borden MD  [facility]      Asia Borden MD    "

## 2021-10-01 NOTE — NURSING NOTE
"Initial /67 (BP Location: Right arm, Patient Position: Chair, Cuff Size: Adult Regular)   Pulse 81   Temp 98.9  F (37.2  C) (Tympanic)   Resp 18   Ht 1.473 m (4' 10\")   Wt 64 kg (141 lb)   LMP 07/26/2021   Breastfeeding No   BMI 29.47 kg/m   Estimated body mass index is 29.47 kg/m  as calculated from the following:    Height as of this encounter: 1.473 m (4' 10\").    Weight as of this encounter: 64 kg (141 lb). .      "

## 2021-10-06 ENCOUNTER — NURSE TRIAGE (OUTPATIENT)
Dept: NURSING | Facility: CLINIC | Age: 31
End: 2021-10-06

## 2021-10-06 NOTE — TELEPHONE ENCOUNTER
Triage call:   Friday went through a miscarriage- already passed all her products of conception  States she was given misoprostol   Reports light bleeding in the AM  Drinking more water  Reports 20 min ago she had a large amount of bleeding - bled through a super tampon onto a light pad  No blood clots today- reports clot on - was a half dollar size  States she is worries about the cramping as well- states that the   Changing her pad every 3 hours   No dizziness, weakness or light headed  Patient wants to be sure that this bleeding is normal after taking the medication.     Advised that a message will be sent to the clinic for additional advice. Patient verbalizes understanding. Declines additional questions. Attempted to contact [facility] but no answer.      Delmi Hurd RN BSN 10/6/2021 3:07 PM        Reason for Disposition    Caller has NON-URGENT question and triager unable to answer question    Additional Information    Negative: Shock suspected (e.g., cold/pale/clammy skin, too weak to stand, low BP, rapid pulse)    Negative: Difficult to awaken or acting confused (e.g., disoriented, slurred speech)    Negative: Passed out (i.e., lost consciousness, collapsed and was not responding)    Negative: Sounds like a life-threatening emergency to the triager    Negative: Threatened miscarriage (threatened ) suspected and has not been examined by a HCP    Negative: Vaginal bleeding is main symptom and more than 4 weeks since medical visit    Negative: Abdomen pain is main symptom and NO vaginal bleeding in past 24 hours    Negative: Not pregnant or pregnancy status unknown    Negative: SEVERE abdominal pain    Negative: SEVERE vaginal bleeding (e.g., soaking 2 pads per hour, large blood clots) and present 2 or more hours    Negative: MODERATE vaginal bleeding (e.g., soaking 1 pad per hour; clots) and present > 6 hours    Negative: Passed tissue (e.g., gray-white)    Negative: Pale skin (pallor) of new  onset or worsening    Negative: Patient sounds very sick or weak to the triager    Negative: Constant abdominal pain and present > 2 hours    Negative: Patient wants to be seen    Protocols used:  - THREATENED MISCARRIAGE FOLLOW-UP CALL-A-[place]

## 2021-10-06 NOTE — TELEPHONE ENCOUNTER
Called patient to discuss concerns. Patient took Cytotec on 10/1 for a missed . Believes she passed tissue on 10/2. Did have a few silver dollar sized blood clots the first 2 days and then bleeding has been subsiding. Today, she had an increase in cramping and then an episode of heavier bleeding and another blood clot. Since then bleeding has stabilized. No lightheadedness or dizziness. Plan to continue to monitor symptoms for now. Discussed waxing/waning of bleeding for about the first week about miscarriage. If bleeding does not continue to decrease after this or is continuously increasing, will need to check ultrasound to assess for retained products of conception. Also discussed warning signs for ER precautions for emergent follow up. All questions answered. Has follow up appointment scheduled for next week.     Monik Turcios DO

## 2021-10-10 ENCOUNTER — HEALTH MAINTENANCE LETTER (OUTPATIENT)
Age: 31
End: 2021-10-10

## 2021-11-02 ENCOUNTER — OFFICE VISIT (OUTPATIENT)
Dept: OBGYN | Facility: CLINIC | Age: 31
End: 2021-11-02
Payer: COMMERCIAL

## 2021-11-02 VITALS
DIASTOLIC BLOOD PRESSURE: 70 MMHG | TEMPERATURE: 98.1 F | BODY MASS INDEX: 28.26 KG/M2 | HEART RATE: 72 BPM | RESPIRATION RATE: 12 BRPM | HEIGHT: 59 IN | SYSTOLIC BLOOD PRESSURE: 108 MMHG | WEIGHT: 140.2 LBS

## 2021-11-02 DIAGNOSIS — Z23 NEED FOR PROPHYLACTIC VACCINATION AND INOCULATION AGAINST INFLUENZA: Primary | ICD-10-CM

## 2021-11-02 DIAGNOSIS — D35.2 PROLACTINOMA (H): ICD-10-CM

## 2021-11-02 DIAGNOSIS — O02.1 MISSED ABORTION: ICD-10-CM

## 2021-11-02 LAB — B-HCG SERPL-ACNC: 5 IU/L (ref 0–5)

## 2021-11-02 PROCEDURE — 84702 CHORIONIC GONADOTROPIN TEST: CPT | Performed by: OBSTETRICS & GYNECOLOGY

## 2021-11-02 PROCEDURE — 90471 IMMUNIZATION ADMIN: CPT | Performed by: OBSTETRICS & GYNECOLOGY

## 2021-11-02 PROCEDURE — 90686 IIV4 VACC NO PRSV 0.5 ML IM: CPT | Performed by: OBSTETRICS & GYNECOLOGY

## 2021-11-02 PROCEDURE — 99213 OFFICE O/P EST LOW 20 MIN: CPT | Mod: 25 | Performed by: OBSTETRICS & GYNECOLOGY

## 2021-11-02 PROCEDURE — 36415 COLL VENOUS BLD VENIPUNCTURE: CPT | Performed by: OBSTETRICS & GYNECOLOGY

## 2021-11-02 ASSESSMENT — MIFFLIN-ST. JEOR: SCORE: 1266.57

## 2021-11-02 NOTE — PROGRESS NOTES
[facility]    Gynecology Office Note    CC:   Chief Complaint   Patient presents with     Follow Up     Imm/Inj     Flu Shot        HPI: Anu Davis is a 30 year old  who presents for follow up after miscarriage. Patient was diagnosed with a missed  and was given Cytotec to treat medically. She reports heavy bleeding with passage of tissue. Bleeding remained intermittently heavy for about a week but has since resolved. Feeling better today. No bleeding, cramping, fevers, or chills. Emotionally feeling OK. Has questions today regarding etiology of miscarriage and management of her prolactinoma.      GYN Hx:     Contraception: none    Last Pap Smear:   Lab Results   Component Value Date    PAP NIL 2019       ROS: A 10 pt ROS was completed and found to be otherwise negative unless mentioned in the HPI.     PMH:   Past Medical History:   Diagnosis Date     Chickenpox      Ganglion of joint 2011     Tenosynovitis        PSHx:   Past Surgical History:   Procedure Laterality Date      SECTION N/A 2018    Procedure:  SECTION;;  Surgeon: Wang Pelletier MD;  Location: [facility]     WRIST SURGERY Right     ganglion removal       OBHx:   OB History    Para Term  AB Living   2 1 1 0 1 1   SAB TAB Ectopic Multiple Live Births   1 0 0 0 1      # Outcome Date GA Lbr Talib/2nd Weight Sex Delivery Anes PTL Lv   2 SAB 10/01/21 8w0d    AB, MISSED      1 Term 18 40w3d 12:45 / 04:16 3.07 kg (6 lb 12.3 oz) F CS-LTranv EPI N GENE      Birth Comments: NP called to the delivery for arrest of decent and fetal tachycardia.  After some pushing it was decided by the OBGYN to go to  secondary to arrest of decent.  NP scrubbed in and assisted in the .  Infant delivered with a nuchal cord x1.  Infant cried right away after delivery.  Infant brought to the warmer by the RN for assessment and measurements. Apgars were 9/9.  No  further intervention required.  Infant to mother for skin to skin and bonding.        Name: Christine      Apgar1: 9  Apgar5: 9       Medications:   clobetasol propionate (CLOBEX) 0.05 % external shampoo, Apply topically to scalp three times per week  fluocinonide (LIDEX) 0.05 % external solution, Apply to scalp BID x 1-2 weeks PRN  ketoconazole (NIZORAL) 2 % external shampoo, Use daily as needed  triamcinolone (KENALOG) 0.1 % external cream, Apply to ears twice per day as needed  cabergoline (DOSTINEX) 0.5 MG tablet, Take 0.5 tablets (0.25 mg) by mouth twice a week If too many side effects, it can be placed in the vagina instead. (Patient not taking: Reported on 9/7/2021)  Prenatal Vit-Fe Fumarate-FA (PRENATAL MULTIVITAMIN W/IRON) 27-0.8 MG tablet, Take 1 tablet by mouth daily (Patient not taking: Reported on 11/2/2021)    No current facility-administered medications on file prior to visit.      Allergies:    No Known Allergies    Social History:   Social History     Socioeconomic History     Marital status:      Spouse name: Not on file     Number of children: Not on file     Years of education: Not on file     Highest education level: Not on file   Occupational History     Not on file   Tobacco Use     Smoking status: Never Smoker     Smokeless tobacco: Never Used   Vaping Use     Vaping Use: Never used   Substance and Sexual Activity     Alcohol use: Yes     Comment: rare     Drug use: No     Sexual activity: Yes     Partners: Male     Comment:  since 2016   Other Topics Concern     Parent/sibling w/ CABG, MI or angioplasty before 65F 55M? Not Asked   Social History Narrative     for grades 2-5 in [place]    Daughter born approx 4/2018     Social Determinants of Health     Financial Resource Strain:      Difficulty of Paying Living Expenses:    Food Insecurity:      Worried About Running Out of Food in the Last Year:      Ran Out of Food in the Last Year:    Transportation  "Needs:      Lack of Transportation (Medical):      Lack of Transportation (Non-Medical):    Physical Activity:      Days of Exercise per Week:      Minutes of Exercise per Session:    Stress:      Feeling of Stress :    Social Connections:      Frequency of Communication with Friends and Family:      Frequency of Social Gatherings with Friends and Family:      Attends Scientologist Services:      Active Member of Clubs or Organizations:      Attends Club or Organization Meetings:      Marital Status:    Intimate Partner Violence:      Fear of Current or Ex-Partner:      Emotionally Abused:      Physically Abused:      Sexually Abused:          Family History:   Family History   Problem Relation Age of Onset     Depression Mother         situational     Medical History Unknown Mother         mother adopted     Hypertension Father      Diverticulitis Father      Colon Polyps Father      Emphysema Paternal Grandmother        Physical Exam:   Vitals:    11/02/21 0832   BP: 108/70   BP Location: Right arm   Patient Position: Sitting   Cuff Size: Adult Regular   Pulse: 72   Resp: 12   Temp: 98.1  F (36.7  C)   TempSrc: Tympanic   Weight: 63.6 kg (140 lb 3.2 oz)   Height: 1.499 m (4' 11\")      Estimated body mass index is 28.32 kg/m  as calculated from the following:    Height as of this encounter: 1.499 m (4' 11\").    Weight as of this encounter: 63.6 kg (140 lb 3.2 oz).    General appearance: well-hydrated, A&O x 3, no apparent distress  Lungs: Equal expansion bilaterally, no accessory muscle use  Heart: No heaves or thrills. No peripheral varicosities  Constitutional: See vitals  Neuro: CN II-XII grossly intact  Genitourinary:  External genitalia: no erythema, no lesions.   Urethral meatus appropriate location without lesions or prolapse  Urethra: No masses, tenderness, or scarring  Bladder no fullness, masses, or tenderness.  Anus and Perineum: Unremarkable, no visible lesions  Vagina: Normal, healthy pink mucosa without " any lesions. Physiologic vaginal discharge.   Cervix: normal appearance, no cervical motion tenderness. Visually closed      Assessment and Plan:     Encounter Diagnoses   Name Primary?     Missed       Need for prophylactic vaccination and inoculation against influenza Yes     Prolactinoma (H)      Discussed possible etiologies of spontaneous . In setting of blighted ovum, almost always is genetic cause. Would not increase her risk of recurrence. Seems that patient is recovering well from SAB. Will recheck beta hcg today to ensure this is trending to zero.     Patient is interested in pursuing pregnancy again in the near future. She has had intercourse twice since SAB, once without any contraception. Encouraged to wait until we trend beta hcg to zero before unprotected intercourse.     Instructed to restart her Cabergoline for the Prolactinoma. Prolactin level last checked this summer when she was off her medications, slightly elevated at 68. Will defer rechecking today as she hasn't been on medications. Would consider recheck if she is having trouble conceiving while on the Cabergoline. Also encouraged to continue to take her prenatal vitamin.     Follow up as needed.  Health Maintenance: flu shot today    Monik Turcios DO

## 2021-11-02 NOTE — NURSING NOTE
"Initial /70 (BP Location: Right arm, Patient Position: Sitting, Cuff Size: Adult Regular)   Pulse 72   Temp 98.1  F (36.7  C) (Tympanic)   Resp 12   Ht 1.499 m (4' 11\")   Wt 63.6 kg (140 lb 3.2 oz)   BMI 28.32 kg/m   Estimated body mass index is 28.32 kg/m  as calculated from the following:    Height as of this encounter: 1.499 m (4' 11\").    Weight as of this encounter: 63.6 kg (140 lb 3.2 oz). .      "

## 2021-12-08 ENCOUNTER — MYC REFILL (OUTPATIENT)
Dept: OBGYN | Facility: CLINIC | Age: 31
End: 2021-12-08
Payer: COMMERCIAL

## 2021-12-08 DIAGNOSIS — E22.1 HYPERPROLACTINEMIA (H): ICD-10-CM

## 2021-12-10 RX ORDER — CABERGOLINE 0.5 MG/1
0.25 TABLET ORAL
Qty: 12 TABLET | Refills: 0 | Status: SHIPPED | OUTPATIENT
Start: 2021-12-13 | End: 2022-02-08

## 2021-12-10 NOTE — TELEPHONE ENCOUNTER
Last Written Prescription Date:  6/23/21  Last Fill Quantity: 12,  # refills: 0   Last office visit: 11/2/2021 with prescribing provider:  Tam   Future Office Visit:  None pending    Requested Prescriptions   Pending Prescriptions Disp Refills     cabergoline (DOSTINEX) 0.5 MG tablet 12 tablet 0     Sig: Take 0.5 tablets (0.25 mg) by mouth twice a week If too many side effects, it can be placed in the vagina instead.       There is no refill protocol information for this order        Routed to physician to review and advise.    Angela Ray RN on 12/10/2021 at 8:43 AM

## 2022-02-02 ENCOUNTER — OFFICE VISIT (OUTPATIENT)
Dept: FAMILY MEDICINE | Facility: CLINIC | Age: 32
End: 2022-02-02
Payer: COMMERCIAL

## 2022-02-02 VITALS
RESPIRATION RATE: 18 BRPM | SYSTOLIC BLOOD PRESSURE: 120 MMHG | OXYGEN SATURATION: 99 % | TEMPERATURE: 98.6 F | HEIGHT: 58 IN | HEART RATE: 102 BPM | DIASTOLIC BLOOD PRESSURE: 66 MMHG | BODY MASS INDEX: 28.97 KG/M2 | WEIGHT: 138 LBS

## 2022-02-02 DIAGNOSIS — Z11.3 ROUTINE SCREENING FOR STI (SEXUALLY TRANSMITTED INFECTION): ICD-10-CM

## 2022-02-02 DIAGNOSIS — Z12.4 SCREENING FOR CERVICAL CANCER: ICD-10-CM

## 2022-02-02 DIAGNOSIS — N89.8 VAGINAL ITCHING: Primary | ICD-10-CM

## 2022-02-02 LAB
CLUE CELLS: NORMAL
TRICHOMONAS, WET PREP: NORMAL
WBC'S/HIGH POWER FIELD, WET PREP: NORMAL
YEAST, WET PREP: NORMAL

## 2022-02-02 PROCEDURE — 99214 OFFICE O/P EST MOD 30 MIN: CPT | Performed by: FAMILY MEDICINE

## 2022-02-02 PROCEDURE — 87591 N.GONORRHOEAE DNA AMP PROB: CPT | Performed by: FAMILY MEDICINE

## 2022-02-02 PROCEDURE — 87624 HPV HI-RISK TYP POOLED RSLT: CPT | Performed by: FAMILY MEDICINE

## 2022-02-02 PROCEDURE — 87210 SMEAR WET MOUNT SALINE/INK: CPT | Performed by: FAMILY MEDICINE

## 2022-02-02 PROCEDURE — 87491 CHLMYD TRACH DNA AMP PROBE: CPT | Performed by: FAMILY MEDICINE

## 2022-02-02 PROCEDURE — G0145 SCR C/V CYTO,THINLAYER,RESCR: HCPCS | Performed by: FAMILY MEDICINE

## 2022-02-02 ASSESSMENT — MIFFLIN-ST. JEOR: SCORE: 1235.71

## 2022-02-02 ASSESSMENT — PAIN SCALES - GENERAL: PAINLEVEL: MILD PAIN (2)

## 2022-02-02 NOTE — PROGRESS NOTES
"  Assessment & Plan     Vaginal itching  Normal wet prep and exam was actually fine except for discharge that could be excess vs physiologic. but I discussed with patient possible just contact irritation, but also that we could do a round of oral flagyl if she tries some home remedies (looser clothes, nonmedicated oil/ointment on vulva and outer labia) and it doesn't improve  - Wet prep - Clinic Collect  - Chlamydia & Gonorrhea by PCR, G[facility]/Range - Clinic Collect  - pt education handout given    Screening for cervical cancer  - Pap screen with HPV - recommended age 30 - 65 years  - HPV Hold (Lab Only)    Routine screening for STI (sexually transmitted infection)  - Chlamydia & Gonorrhea by PCR, [facility]/Range - Clinic Collect        Return if symptoms worsen or fail to improve.    Emily Busby MD  [facility]   Saige is a 31 year old who presents for the following health issues  accompanied by her self.    HPI     Vaginal Symptoms  Onset/Duration: 1 week ago   Description:  Vaginal Discharge: clear curd-like   Itching (Pruritis): YES  Burning sensation:  YES  Odor: no  Accompanying Signs & Symptoms:  Urinary symptoms: no  Abdominal pain: no  Fever: no  History:   Sexually active: YES  New Partner: no  Possibility of Pregnancy:  no  Recent antibiotic use: no  Previous vaginitis issues: no  Precipitating or alleviating factors: None  Therapies tried and outcome: patient has been using Monistat    Has had similar before in the past. Definitely yeast at some point but long ago she had more of a vulva irritation was told to use cooking oil which helped      Review of Systems   As above      Objective    /66   Pulse 102   Temp 98.6  F (37  C) (Tympanic)   Resp 18   Ht 1.473 m (4' 10\")   Wt 62.6 kg (138 lb)   LMP 01/20/2022 (Exact Date)   SpO2 99%   BMI 28.84 kg/m    Body mass index is 28.84 kg/m .  Physical Exam   GENERAL: healthy, alert and no distress  RESP: " normal respiratory effort, speaking in complete sentences  MS: no gross musculoskeletal defects noted, no edema  PSYCH: mentation appears normal, affect normal/bright   (female): normal female external genitalia, normal urethral meatus, vaginal mucosa, normal cervix  without masses, but she does have excessive thin milky discharge

## 2022-02-02 NOTE — PATIENT INSTRUCTIONS
"Patient Education     Vaginal Infection: Understanding the Vaginal Environment  The vagina is a canal. It connects the uterus (womb) to the outside of the body. It's home to many types of bacteria and other tiny organisms. These different bacteria most often stay balanced in number. This keeps the vagina healthy. If the balance changes, it can cause infection.    A healthy environment  Many types of bacteria are present in a healthy vagina. When balanced, they don t cause problems. Small amounts of yeast may also be present without causing problems. The most common type of bacteria in the vagina is lactobacillus. It helps keep the vagina at a low pH. A low pH keeps bad bacteria from taking over.   Normal vaginal discharge  The vagina makes fluid. It's sent out as discharge. This also keeps the vagina healthy. Normal discharge can be clear, white, or yellowish. Most women find that normal discharge varies in amount and color through the month.   An unhealthy environment  The vaginal environment may get out of balance. This may result in a vaginal infection. There are a few reasons this can happen. The pH may have changed. The amount of one organism, such as yeast, may increase. Or an outside organism may get into the vagina and throw off the balance:     Bacterial vaginosis (BV). BV is due to an imbalance in the normal bacteria in the vagina. Lactobacillus bacteria decrease. As a result, the numbers of bad bacteria increase.    Candidiasis (yeast infection). Yeast is a type of fungus. A yeast infection occurs when yeast cells in the vagina increase. They then attack vaginal tissues. A type of yeast called Candida albicans is often involved.    Trichomoniasis (\"trich\"). Trich is a parasite. It's passed from one person to another during sex. Men with trich often don t have any symptoms. In women, it can take weeks or months before symptoms appear.  [place] last reviewed this educational content on 4/1/2020    " 0225-6373 [employer]. All rights reserved. This information is not intended as a substitute for professional medical care. Always follow your healthcare professional's instructions.

## 2022-02-03 LAB
C TRACH DNA SPEC QL PROBE+SIG AMP: NEGATIVE
N GONORRHOEA DNA SPEC QL NAA+PROBE: NEGATIVE

## 2022-02-04 ENCOUNTER — MYC MEDICAL ADVICE (OUTPATIENT)
Dept: FAMILY MEDICINE | Facility: CLINIC | Age: 32
End: 2022-02-04
Payer: COMMERCIAL

## 2022-02-04 DIAGNOSIS — N76.0 VAGINITIS AND VULVOVAGINITIS: Primary | ICD-10-CM

## 2022-02-07 LAB
BKR LAB AP GYN ADEQUACY: NORMAL
BKR LAB AP GYN INTERPRETATION: NORMAL
BKR LAB AP HPV REFLEX: NORMAL
BKR LAB AP PREVIOUS ABNORMAL: NORMAL
PATH REPORT.COMMENTS IMP SPEC: NORMAL
PATH REPORT.COMMENTS IMP SPEC: NORMAL
PATH REPORT.RELEVANT HX SPEC: NORMAL

## 2022-02-07 RX ORDER — FLUCONAZOLE 150 MG/1
150 TABLET ORAL ONCE
Qty: 1 TABLET | Refills: 0 | Status: SHIPPED | OUTPATIENT
Start: 2022-02-07 | End: 2022-02-07

## 2022-02-07 RX ORDER — METRONIDAZOLE 500 MG/1
500 TABLET ORAL 2 TIMES DAILY
Qty: 14 TABLET | Refills: 0 | Status: SHIPPED | OUTPATIENT
Start: 2022-02-07 | End: 2022-02-14

## 2022-02-07 NOTE — TELEPHONE ENCOUNTER
Routing to Provider to review and advise.    Refer to patients mychart message.    Lia Oliveira RN BSN

## 2022-02-08 DIAGNOSIS — E22.1 HYPERPROLACTINEMIA (H): ICD-10-CM

## 2022-02-08 LAB
HUMAN PAPILLOMA VIRUS 16 DNA: NEGATIVE
HUMAN PAPILLOMA VIRUS 18 DNA: NEGATIVE
HUMAN PAPILLOMA VIRUS FINAL DIAGNOSIS: NORMAL
HUMAN PAPILLOMA VIRUS OTHER HR: NEGATIVE

## 2022-02-08 RX ORDER — CABERGOLINE 0.5 MG/1
0.25 TABLET ORAL
Qty: 12 TABLET | Refills: 0 | Status: SHIPPED | OUTPATIENT
Start: 2022-02-10 | End: 2022-06-04

## 2022-02-08 NOTE — TELEPHONE ENCOUNTER
Hello,    The patient is requesting a refill on Cabergoline 0.5 mg tablet. Thanks!    Nat Viveros [facility]  Phone: [phone]  Fax: [phone]

## 2022-02-23 ENCOUNTER — MYC MEDICAL ADVICE (OUTPATIENT)
Dept: FAMILY MEDICINE | Facility: CLINIC | Age: 32
End: 2022-02-23

## 2022-02-23 ENCOUNTER — OFFICE VISIT (OUTPATIENT)
Dept: FAMILY MEDICINE | Facility: CLINIC | Age: 32
End: 2022-02-23
Payer: COMMERCIAL

## 2022-02-23 VITALS
BODY MASS INDEX: 29.81 KG/M2 | DIASTOLIC BLOOD PRESSURE: 60 MMHG | HEIGHT: 58 IN | RESPIRATION RATE: 16 BRPM | OXYGEN SATURATION: 100 % | SYSTOLIC BLOOD PRESSURE: 122 MMHG | HEART RATE: 81 BPM | WEIGHT: 142 LBS

## 2022-02-23 DIAGNOSIS — N89.8 VAGINAL DISCHARGE: Primary | ICD-10-CM

## 2022-02-23 DIAGNOSIS — N76.3 SUBACUTE VULVITIS: ICD-10-CM

## 2022-02-23 LAB
ALBUMIN UR-MCNC: NEGATIVE MG/DL
APPEARANCE UR: CLEAR
BILIRUB UR QL STRIP: NEGATIVE
CLUE CELLS: ABNORMAL
COLOR UR AUTO: YELLOW
GLUCOSE UR STRIP-MCNC: NEGATIVE MG/DL
HGB UR QL STRIP: NEGATIVE
KETONES UR STRIP-MCNC: NEGATIVE MG/DL
LEUKOCYTE ESTERASE UR QL STRIP: NEGATIVE
NITRATE UR QL: NEGATIVE
PH UR STRIP: 6.5 [PH] (ref 5–7)
SP GR UR STRIP: <=1.005 (ref 1–1.03)
TRICHOMONAS, WET PREP: ABNORMAL
UROBILINOGEN UR STRIP-ACNC: 0.2 E.U./DL
WBC'S/HIGH POWER FIELD, WET PREP: ABNORMAL
YEAST, WET PREP: ABNORMAL

## 2022-02-23 PROCEDURE — 99214 OFFICE O/P EST MOD 30 MIN: CPT | Performed by: FAMILY MEDICINE

## 2022-02-23 PROCEDURE — 81003 URINALYSIS AUTO W/O SCOPE: CPT | Performed by: FAMILY MEDICINE

## 2022-02-23 PROCEDURE — 87210 SMEAR WET MOUNT SALINE/INK: CPT | Performed by: FAMILY MEDICINE

## 2022-02-23 RX ORDER — TRIAMCINOLONE ACETONIDE 1 MG/G
OINTMENT TOPICAL 2 TIMES DAILY
Qty: 30 G | Refills: 1 | Status: SHIPPED | OUTPATIENT
Start: 2022-02-23 | End: 2022-04-28

## 2022-02-23 RX ORDER — FLUCONAZOLE 150 MG/1
TABLET ORAL
COMMUNITY
Start: 2022-02-07 | End: 2022-04-28

## 2022-02-23 NOTE — PROGRESS NOTES
"  Assessment & Plan     Vaginal discharge  - UA macro with reflex to Microscopic and Culture - Clinc Collect  - Ob/Gyn Referral  - Wet prep - Clinic Collect    Subacute vulvitis  - triamcinolone (KENALOG) 0.1 % external ointment  Dispense: 30 g; Refill: 1  - Ob/Gyn Referral  - Wet prep - Clinic Collect    Return if symptoms worsen or fail to improve.    Emily Busby MD  [facility]            Moody Moulton is a 31 year old who presents for the following health issues  accompanied by her self.    Vaginal Problem     History of Present Illness     Reason for visit:  Follow up on pervious visit with continued symptoms.  Symptom onset:  3-4 weeks ago  Symptoms include:  Yeast like symptoms around the vaginal and anal area.  Symptom intensity:  Moderate  Symptom progression:  Staying the same  Had these symptoms before:  Yes  Has tried/received treatment for these symptoms:  Yes  Previous treatment was successful:  No  What makes it better:  Baking soda baths    She eats 0-1 servings of fruits and vegetables daily.She consumes 0 sweetened beverage(s) daily.She exercises with enough effort to increase her heart rate 30 to 60 minutes per day.  She exercises with enough effort to increase her heart rate 3 or less days per week.   She is taking medications regularly.     Review of Systems   Genitourinary: Positive for vaginal discharge.          Objective    /60   Pulse 81   Resp 16   Ht 1.473 m (4' 10\")   Wt 64.4 kg (142 lb)   LMP 02/17/2022 (Exact Date)   SpO2 100%   BMI 29.68 kg/m    Body mass index is 29.68 kg/m .  Physical Exam   GENERAL: healthy, alert and no distress  NECK: no adenopathy, no asymmetry, masses, or scars and thyroid normal to palpation  RESP: lungs clear to auscultation - no rales, rhonchi or wheezes  CV: regular rate and rhythm, normal S1 S2, no S3 or S4, no murmur, click or rub, no peripheral edema and peripheral pulses strong  ABDOMEN: soft, nontender, no " hepatosplenomegaly, no masses and bowel sounds normal   (female): normal female external genitalia though some slight skin irritation with mild redness and sticking together of apposed skin, otherwise normal urethral meatus, vaginal mucosa, normal cervix/adnexa/uterus without masses, some milky translucent discharge but otherwise normal  MS: no gross musculoskeletal defects noted, no edema

## 2022-02-24 ENCOUNTER — OFFICE VISIT (OUTPATIENT)
Dept: DERMATOLOGY | Facility: CLINIC | Age: 32
End: 2022-02-24
Payer: COMMERCIAL

## 2022-02-24 VITALS — HEART RATE: 73 BPM | SYSTOLIC BLOOD PRESSURE: 107 MMHG | OXYGEN SATURATION: 100 % | DIASTOLIC BLOOD PRESSURE: 73 MMHG

## 2022-02-24 DIAGNOSIS — L40.9 PSORIASIS: ICD-10-CM

## 2022-02-24 PROCEDURE — 99212 OFFICE O/P EST SF 10 MIN: CPT | Performed by: PHYSICIAN ASSISTANT

## 2022-02-24 RX ORDER — FLUOCINONIDE TOPICAL SOLUTION USP, 0.05% 0.5 MG/ML
SOLUTION TOPICAL
Qty: 50 ML | Refills: 3 | Status: SHIPPED | OUTPATIENT
Start: 2022-02-24 | End: 2023-08-14

## 2022-02-24 RX ORDER — KETOCONAZOLE 20 MG/ML
SHAMPOO TOPICAL
Qty: 120 ML | Refills: 11 | Status: SHIPPED | OUTPATIENT
Start: 2022-02-24 | End: 2023-08-14

## 2022-02-24 NOTE — PROGRESS NOTES
HPI:   Chief complaints: Anu Davis is a 30 year old female who presents for recheck of scalp psoriasis. She is using ketoconazole shampoo as needed and lidex as needed. She has noticed improvement. She is still a little itchy on the ears.       Shx: . Lives in [place]. Here today with daughter Chet       PHYSICAL EXAM:    /73 (BP Location: Left arm, Patient Position: Sitting, Cuff Size: Adult Regular)   Pulse 73   LMP 02/17/2022 (Exact Date)   SpO2 100%   Skin exam performed as follows: Type 2 skin. Mood appropriate  Alert and Oriented X 3. Well developed, well nourished in no distress.  General appearance: Normal  Head including face: Normal  Eyes: conjunctiva and lids: Normal  Mouth: Lips, teeth, gums: Normal  Neck: Normal  Chest-breast/axillae: Normal  Back: Normal  Spleen and liver: Normal  Cardiovascular: Exam of peripheral vascular system by observation for swelling, varicosities, edema: Normal  Genitalia: groin, buttocks: Normal  Extremities: digits/nails (clubbing): Normal  Eccrine and Apocrine glands: Normal  Right upper extremity: Normal  Left upper extremity: Normal  Right lower extremity: Normal  Left lower extremity: Normal  Skin: Scalp and body hair: See below    1. Skin clear!    ASSESSMENT/PLAN:     1. Psoriasis - doing well on current regimen.  --Continue ketoconazole shampoo daily as needed  --Continue lidex BID x 1-2 weeks then PRN          Follow-up: yearly  CC:   Scribed By: Reyna Huitron, MS, PA-C

## 2022-02-24 NOTE — LETTER
2/24/2022         RE: Anu Davis  [address]  [place] [facility] [facility] [address]        Dear Colleague,    Thank you for referring your patient, Anu Davis, to the [facility]. Please see a copy of my visit note below.    HPI:   Chief complaints: Anu Davis is a 30 year old female who presents for recheck of scalp psoriasis. She is using ketoconazole shampoo as needed and lidex as needed. She has noticed improvement. She is still a little itchy on the ears.       Shx: . Lives in [place]. Here today with daughter Chet       PHYSICAL EXAM:    /73 (BP Location: Left arm, Patient Position: Sitting, Cuff Size: Adult Regular)   Pulse 73   LMP 02/17/2022 (Exact Date)   SpO2 100%   Skin exam performed as follows: Type 2 skin. Mood appropriate  Alert and Oriented X 3. Well developed, well nourished in no distress.  General appearance: Normal  Head including face: Normal  Eyes: conjunctiva and lids: Normal  Mouth: Lips, teeth, gums: Normal  Neck: Normal  Chest-breast/axillae: Normal  Back: Normal  Spleen and liver: Normal  Cardiovascular: Exam of peripheral vascular system by observation for swelling, varicosities, edema: Normal  Genitalia: groin, buttocks: Normal  Extremities: digits/nails (clubbing): Normal  Eccrine and Apocrine glands: Normal  Right upper extremity: Normal  Left upper extremity: Normal  Right lower extremity: Normal  Left lower extremity: Normal  Skin: Scalp and body hair: See below    1. Skin clear!    ASSESSMENT/PLAN:     1. Psoriasis - doing well on current regimen.  --Continue ketoconazole shampoo daily as needed  --Continue lidex BID x 1-2 weeks then PRN          Follow-up: yearly  CC:   Scribed By: Reyna Huitron, MS, PAWILLIAM          Again, thank you for allowing me to participate in the care of your patient.        Sincerely,        Reyna Huitron PA-C

## 2022-02-24 NOTE — NURSING NOTE
Chief Complaint   Patient presents with     Psoriasis     f/u of scalp and behind ears     Derm Problem     mole on back        Vitals:    02/24/22 1553   BP: 107/73   BP Location: Left arm   Patient Position: Sitting   Cuff Size: Adult Regular   Pulse: 73   SpO2: 100%     Wt Readings from Last 1 Encounters:   02/23/22 64.4 kg (142 lb)       Cookie Stein LPN .................2/24/2022

## 2022-03-04 ENCOUNTER — MYC MEDICAL ADVICE (OUTPATIENT)
Dept: FAMILY MEDICINE | Facility: CLINIC | Age: 32
End: 2022-03-04
Payer: COMMERCIAL

## 2022-03-07 NOTE — TELEPHONE ENCOUNTER
Routing to Provider to review nettie advise.     Refer to patients mychart message.     Lia Oliveira RN BSN

## 2022-04-28 ENCOUNTER — OFFICE VISIT (OUTPATIENT)
Dept: OBGYN | Facility: CLINIC | Age: 32
End: 2022-04-28
Payer: COMMERCIAL

## 2022-04-28 VITALS
SYSTOLIC BLOOD PRESSURE: 108 MMHG | HEART RATE: 83 BPM | RESPIRATION RATE: 16 BRPM | TEMPERATURE: 98.9 F | WEIGHT: 138.4 LBS | DIASTOLIC BLOOD PRESSURE: 66 MMHG | HEIGHT: 58 IN | BODY MASS INDEX: 29.05 KG/M2

## 2022-04-28 DIAGNOSIS — D35.2 PROLACTINOMA (H): Primary | ICD-10-CM

## 2022-04-28 DIAGNOSIS — E22.1 HYPERPROLACTINEMIA (H): ICD-10-CM

## 2022-04-28 DIAGNOSIS — N92.4 EXCESSIVE BLEEDING IN PREMENOPAUSAL PERIOD: ICD-10-CM

## 2022-04-28 DIAGNOSIS — N72 CERVICITIS: ICD-10-CM

## 2022-04-28 DIAGNOSIS — N89.8 VAGINAL DISCHARGE: ICD-10-CM

## 2022-04-28 LAB
CLUE CELLS: ABNORMAL
PROLACTIN SERPL-MCNC: 11 UG/L (ref 3–27)
TRICHOMONAS, WET PREP: ABNORMAL
WBC'S/HIGH POWER FIELD, WET PREP: ABNORMAL
YEAST, WET PREP: ABNORMAL

## 2022-04-28 PROCEDURE — 36415 COLL VENOUS BLD VENIPUNCTURE: CPT | Performed by: OBSTETRICS & GYNECOLOGY

## 2022-04-28 PROCEDURE — 99215 OFFICE O/P EST HI 40 MIN: CPT | Performed by: OBSTETRICS & GYNECOLOGY

## 2022-04-28 PROCEDURE — 87210 SMEAR WET MOUNT SALINE/INK: CPT | Performed by: OBSTETRICS & GYNECOLOGY

## 2022-04-28 PROCEDURE — 84146 ASSAY OF PROLACTIN: CPT | Performed by: OBSTETRICS & GYNECOLOGY

## 2022-04-28 RX ORDER — DOXYCYCLINE HYCLATE 100 MG
100 TABLET ORAL 2 TIMES DAILY
Qty: 14 TABLET | Refills: 0 | Status: SHIPPED | OUTPATIENT
Start: 2022-04-28 | End: 2022-05-05

## 2022-04-28 RX ORDER — TRANEXAMIC ACID 650 MG/1
1300 TABLET ORAL 2 TIMES DAILY
Qty: 30 TABLET | Refills: 1 | Status: SHIPPED | OUTPATIENT
Start: 2022-04-28 | End: 2022-07-06

## 2022-04-28 NOTE — PROGRESS NOTES
Source: patient/chart  CC: heavy menses, infertility, vulvar rash  Anu is a 31 year old  here for consultation at the request of self for menstrual concerns and infertility.  Known hx of hyperprolactinemia.  Was restarted on Dostinex in 2021.  Menses resumed and are now regular.  She has a miscarriage event 10/2021.  Menses are regular again but they are heavy with clots.  They have not been as deliberate at their fertility attempts. She is interested in treating the heaviness of her menses without hormones.     No new imaging since miscarriage or prolactin levels since 2021  Additionally, since the end of January, she felt like she had a yeast infection.  Wet preps were negative except WBCs.  External genital (from vulva to buttock fold) redness and itchiness--worse with bowel movements.  More noticeable discharge as well.  She was treated for yeast and BV empirically--no improvement.  STI testing negative.  Monogamous.   Has not used any topical therapies.      ROS: Ten point review of systems was reviewed and negative except the above.    Patient Active Problem List   Diagnosis     S/P      Irregular menses     Prolactinoma (H)     Psoriasis     Past Medical History:   Diagnosis Date     Chickenpox      Ganglion of joint 2011     Tenosynovitis      Past Surgical History:   Procedure Laterality Date      SECTION N/A 2018    Procedure:  SECTION;;  Surgeon: Wang Pelletier MD;  Location: [facility] OR     WRIST SURGERY Right     ganglion removal       ALL/Meds: Her medication and allergy histories were reviewed and are documented in their appropriate chart areas.    Social History     Tobacco Use     Smoking status: Never Smoker     Smokeless tobacco: Never Used   Vaping Use     Vaping Use: Never used   Substance Use Topics     Alcohol use: Yes     Comment: rare     Drug use: No       FH: Her family history was reviewed and documented in its appropriate chart  "area.    PE: /66 (BP Location: Right arm, Patient Position: Chair, Cuff Size: Adult Regular)   Pulse 83   Temp 98.9  F (37.2  C) (Tympanic)   Resp 16   Ht 1.473 m (4' 10\")   Wt 62.8 kg (138 lb 6.4 oz)   LMP 04/15/2022   Breastfeeding No   BMI 28.93 kg/m    Body mass index is 28.93 kg/m .    General Appearance:  healthy, alert, active, no distress  HEENT: NCAT  Abdomen: Soft, nontender.  Normal bowel sounds.  No masses  Pelvic:       - Ext: Normal external genitalia.  Mild erythema of the labia majora and into the perineum/perianal skin.  No obvious discrete lesions       - Urethral Meatus: normal appearance       - Vagina: pink, moist, normal rugae, Microscopic wet-mount exam shows white blood cells.     A/P     ICD-10-CM    1. Prolactinoma (H)  D35.2    2. Excessive bleeding in premenopausal period  N92.4 tranexamic acid (LYSTEDA) 650 MG tablet   3. Cervicitis  N72 doxycycline hyclate (VIBRA-TABS) 100 MG tablet   4. Vaginal discharge  N89.8 Wet prep - Clinic Collect   5. Hyperprolactinemia (H)  E22.1 Prolactin       1. Prolactinoma: plan repeat prolactin level.  Presumably her level is suppressed enough to result in monthly menses.   2. Heavy menses: will trial TXA for non-hormonal management of her menses since they are open to conception.  Discussed timed intercourse and when we would consider her infertile (October 2022--1 year since her SAB)  3. WBCs on wet prep--reviewed that this is a nonspecific finding, but given her fertility concerns, will treat empirically for cervicitis with doxycycline if the patient is on her menses.    Ifrah Hernandez M.D.    42 minutes spent on the date of the encounter doing chart review, review of test results, patient visit and documentation    "

## 2022-04-28 NOTE — PATIENT INSTRUCTIONS
Regarding your heavy periods:     You can use the tranexamic acid (Lysteda) to slow the bleeding down on your heavy days    Regarding your bottom:     Use fragrance/dye free products including soap and laundry detergent     You can try over the counter hydrocortisone OINTMENT (you can find this in the hemorrhoid section)     Use mild lotion/oil for moisturizing    If you get your period, you can take the doxycycline to treat the inflammation of your cervix/vagina

## 2022-04-28 NOTE — NURSING NOTE
"Initial /66 (BP Location: Right arm, Patient Position: Chair, Cuff Size: Adult Regular)   Pulse 83   Temp 98.9  F (37.2  C) (Tympanic)   Resp 16   Ht 1.473 m (4' 10\")   Wt 62.8 kg (138 lb 6.4 oz)   LMP 04/15/2022   Breastfeeding No   BMI 28.93 kg/m   Estimated body mass index is 28.93 kg/m  as calculated from the following:    Height as of this encounter: 1.473 m (4' 10\").    Weight as of this encounter: 62.8 kg (138 lb 6.4 oz). .    Cookie Dumont, ALLI    "

## 2022-05-21 ENCOUNTER — HEALTH MAINTENANCE LETTER (OUTPATIENT)
Age: 32
End: 2022-05-21

## 2022-05-23 PROBLEM — D35.2 PROLACTINOMA (H): Status: ACTIVE | Noted: 2019-03-26

## 2022-07-06 ENCOUNTER — PRENATAL OFFICE VISIT (OUTPATIENT)
Dept: OBGYN | Facility: CLINIC | Age: 32
End: 2022-07-06
Payer: COMMERCIAL

## 2022-07-06 ENCOUNTER — APPOINTMENT (OUTPATIENT)
Dept: OBGYN | Facility: CLINIC | Age: 32
End: 2022-07-06
Payer: COMMERCIAL

## 2022-07-06 DIAGNOSIS — Z34.80 PRENATAL CARE, SUBSEQUENT PREGNANCY: ICD-10-CM

## 2022-07-06 PROCEDURE — 99207 PR NO CHARGE NURSE ONLY: CPT | Performed by: OBSTETRICS & GYNECOLOGY

## 2022-07-06 NOTE — PROGRESS NOTES
Lifestyle and nutrition teaching completed   Indiana Velazquez OB Intake Nurse    Patient supplied answers from flow sheet for:  Prenatal OB Questionnaire.

## 2022-07-18 ENCOUNTER — LAB (OUTPATIENT)
Dept: LAB | Facility: CLINIC | Age: 32
End: 2022-07-18
Payer: COMMERCIAL

## 2022-07-18 DIAGNOSIS — Z87.59 HISTORY OF MISCARRIAGE: ICD-10-CM

## 2022-07-18 LAB — B-HCG SERPL-ACNC: ABNORMAL IU/L (ref 0–5)

## 2022-07-18 PROCEDURE — 36415 COLL VENOUS BLD VENIPUNCTURE: CPT

## 2022-07-18 PROCEDURE — 84702 CHORIONIC GONADOTROPIN TEST: CPT

## 2022-07-20 ENCOUNTER — PRENATAL OFFICE VISIT (OUTPATIENT)
Dept: OBGYN | Facility: CLINIC | Age: 32
End: 2022-07-20
Payer: COMMERCIAL

## 2022-07-20 VITALS
SYSTOLIC BLOOD PRESSURE: 116 MMHG | WEIGHT: 141.6 LBS | HEIGHT: 58 IN | BODY MASS INDEX: 29.72 KG/M2 | DIASTOLIC BLOOD PRESSURE: 79 MMHG | HEART RATE: 101 BPM | TEMPERATURE: 98.6 F | RESPIRATION RATE: 16 BRPM

## 2022-07-20 DIAGNOSIS — Z34.81 PRENATAL CARE, SUBSEQUENT PREGNANCY IN FIRST TRIMESTER: Primary | ICD-10-CM

## 2022-07-20 LAB
ABO/RH(D): NORMAL
ALBUMIN UR-MCNC: NEGATIVE MG/DL
ANTIBODY SCREEN: NEGATIVE
APPEARANCE UR: CLEAR
BACTERIA #/AREA URNS HPF: ABNORMAL /HPF
BILIRUB UR QL STRIP: NEGATIVE
COLOR UR AUTO: YELLOW
ERYTHROCYTE [DISTWIDTH] IN BLOOD BY AUTOMATED COUNT: 12.2 % (ref 10–15)
GLUCOSE UR STRIP-MCNC: NEGATIVE MG/DL
HBV SURFACE AG SERPL QL IA: NONREACTIVE
HCT VFR BLD AUTO: 38.9 % (ref 35–47)
HCV AB SERPL QL IA: NONREACTIVE
HGB BLD-MCNC: 12.9 G/DL (ref 11.7–15.7)
HGB UR QL STRIP: NEGATIVE
HIV 1+2 AB+HIV1 P24 AG SERPL QL IA: NONREACTIVE
KETONES UR STRIP-MCNC: NEGATIVE MG/DL
LEUKOCYTE ESTERASE UR QL STRIP: NEGATIVE
MCH RBC QN AUTO: 29.6 PG (ref 26.5–33)
MCHC RBC AUTO-ENTMCNC: 33.2 G/DL (ref 31.5–36.5)
MCV RBC AUTO: 89 FL (ref 78–100)
MUCOUS THREADS #/AREA URNS LPF: PRESENT /LPF
NITRATE UR QL: NEGATIVE
PH UR STRIP: 7 [PH] (ref 5–7)
PLATELET # BLD AUTO: 313 10E3/UL (ref 150–450)
PROGEST SERPL-MCNC: 23 NG/ML
RBC # BLD AUTO: 4.36 10E6/UL (ref 3.8–5.2)
RBC #/AREA URNS AUTO: ABNORMAL /HPF
SP GR UR STRIP: 1.02 (ref 1–1.03)
SPECIMEN EXPIRATION DATE: NORMAL
SQUAMOUS #/AREA URNS AUTO: ABNORMAL /LPF
T PALLIDUM AB SER QL: NONREACTIVE
UROBILINOGEN UR STRIP-ACNC: 0.2 E.U./DL
WBC # BLD AUTO: 11 10E3/UL (ref 4–11)
WBC #/AREA URNS AUTO: ABNORMAL /HPF

## 2022-07-20 PROCEDURE — 86803 HEPATITIS C AB TEST: CPT | Performed by: OBSTETRICS & GYNECOLOGY

## 2022-07-20 PROCEDURE — 86850 RBC ANTIBODY SCREEN: CPT | Performed by: OBSTETRICS & GYNECOLOGY

## 2022-07-20 PROCEDURE — 81001 URINALYSIS AUTO W/SCOPE: CPT | Performed by: OBSTETRICS & GYNECOLOGY

## 2022-07-20 PROCEDURE — 86901 BLOOD TYPING SEROLOGIC RH(D): CPT | Performed by: OBSTETRICS & GYNECOLOGY

## 2022-07-20 PROCEDURE — 87340 HEPATITIS B SURFACE AG IA: CPT | Performed by: OBSTETRICS & GYNECOLOGY

## 2022-07-20 PROCEDURE — 87591 N.GONORRHOEAE DNA AMP PROB: CPT | Performed by: OBSTETRICS & GYNECOLOGY

## 2022-07-20 PROCEDURE — 99207 PR FIRST OB VISIT: CPT | Performed by: OBSTETRICS & GYNECOLOGY

## 2022-07-20 PROCEDURE — 86780 TREPONEMA PALLIDUM: CPT | Performed by: OBSTETRICS & GYNECOLOGY

## 2022-07-20 PROCEDURE — 86762 RUBELLA ANTIBODY: CPT | Performed by: OBSTETRICS & GYNECOLOGY

## 2022-07-20 PROCEDURE — 87491 CHLMYD TRACH DNA AMP PROBE: CPT | Performed by: OBSTETRICS & GYNECOLOGY

## 2022-07-20 PROCEDURE — 76817 TRANSVAGINAL US OBSTETRIC: CPT | Performed by: OBSTETRICS & GYNECOLOGY

## 2022-07-20 PROCEDURE — 87086 URINE CULTURE/COLONY COUNT: CPT | Performed by: OBSTETRICS & GYNECOLOGY

## 2022-07-20 PROCEDURE — 84144 ASSAY OF PROGESTERONE: CPT | Performed by: OBSTETRICS & GYNECOLOGY

## 2022-07-20 PROCEDURE — 86900 BLOOD TYPING SEROLOGIC ABO: CPT | Performed by: OBSTETRICS & GYNECOLOGY

## 2022-07-20 PROCEDURE — 85027 COMPLETE CBC AUTOMATED: CPT | Performed by: OBSTETRICS & GYNECOLOGY

## 2022-07-20 PROCEDURE — 36415 COLL VENOUS BLD VENIPUNCTURE: CPT | Performed by: OBSTETRICS & GYNECOLOGY

## 2022-07-20 PROCEDURE — 87389 HIV-1 AG W/HIV-1&-2 AB AG IA: CPT | Performed by: OBSTETRICS & GYNECOLOGY

## 2022-07-20 NOTE — NURSING NOTE
"Initial /79 (BP Location: Right arm, Patient Position: Chair, Cuff Size: Adult Regular)   Pulse 101   Temp 98.6  F (37  C) (Tympanic)   Resp 16   Ht 1.473 m (4' 10\")   Wt 64.2 kg (141 lb 9.6 oz)   LMP 06/09/2022   Breastfeeding No   BMI 29.59 kg/m   Estimated body mass index is 29.59 kg/m  as calculated from the following:    Height as of this encounter: 1.473 m (4' 10\").    Weight as of this encounter: 64.2 kg (141 lb 9.6 oz). .    Cookie Dumont, ALLI    "

## 2022-07-21 LAB
BACTERIA UR CULT: NORMAL
C TRACH DNA SPEC QL PROBE+SIG AMP: NEGATIVE
N GONORRHOEA DNA SPEC QL NAA+PROBE: NEGATIVE
RUBV IGG SERPL QL IA: 3.33 INDEX
RUBV IGG SERPL QL IA: POSITIVE

## 2022-07-24 NOTE — PROGRESS NOTES
Anu Davis is a 31 year old   who presents to the clinic for an new ob visit.    Estimated Date of Delivery: Mar 16, 2023 is calculated from Patient's last menstrual period was 2022.   She has not had bleeding since her LMP.   She has had mild nausea. Weigh loss has not occurred.   This was a planned pregnancy.   FOB is involved,  Dimas   OTHER CONCERNS: previous loss and previous  section for arrest of descent of 6# 12 oz female  INFECTION HISTORY  HIV: no  Hepatitis B: no  Hepatitis C: no  Syphilis:  no  Tuberculosis: no   PPD- no  Herpes self: no  Herpes partner:  no  Chlamydia:  no  Gonorrhea:  no  HPV: no  BV:  no  Trichomonis:  no  Chicken Pox:  YES  ====================================================  PERSONAL/SOCIAL HISTORY  Lives lives with their family.  Employment: Full time.  Her job involves moderate activity .  Additional items: None  =====================================================   REVIEW OF SYSTEMS  CONSTITUTIONAL: NEGATIVE for fever, chills  INTEGUMENTARY/SKIN: NEGATIVE for worrisome rashes, moles or lesions  EYES: NEGATIVE for vision changes   ENT/MOUTH: NEGATIVE for ear, mouth and throat problems  RESP: NEGATIVE for significant cough or SOB  BREAST: NEGATIVE for masses, tenderness or discharge  CV: NEGATIVE for chest pain, palpitations   GI: NEGATIVE for nausea, abdominal pain, heartburn, or change in bowel habits  : NEGATIVE for frequency, dysuria, or hematuria  MUSCULOSKELETAL: NEGATIVE for significant arthralgias or myalgia  NEURO: NEGATIVE for weakness, dizziness or paresthesias or headache  ENDOCRINE: NEGATIVE for temperature intolerance, skin/hair changes  HEME: NEGATIVE for bleeding problems  PSYCHIATRIC: NEGATIVE for changes in mood or affect  ====================================================  PHYSICAL EXAM:  /79 (BP Location: Right arm, Patient Position: Chair, Cuff Size: Adult Regular)   Pulse 101   Temp 98.6  F (37  C) (Tympanic)    "Resp 16   Ht 1.473 m (4' 10\")   Wt 64.2 kg (141 lb 9.6 oz)   LMP 2022   Breastfeeding No   BMI 29.59 kg/m    BMI- Body mass index is 29.59 kg/m .,     RECOMMENDED WEIGHT GAIN: 15-25 lbs.  GENERAL:  Pleasant pregnant female, alert, well groomed.  SKIN:  Warm and dry, without lesions or rashes  HEAD: Symmetrical features.  EYES:  PERRLA,   MOUTH:  Buccal mucosa pink, moist without lesions.    NECK:  Thyroid without enlargement and nodules.  Lymph nodes not palpable.   LUNGS:  Clear to auscultation.  BREAST:  Symmetrical.  No dominant, fixed or suspicious masses are noted.  No skin or nipple changes or axillary nodes.  Self exam is taught and encouraged.  Nipples everted.      HEART:  RRR without murmur.  ABDOMEN: Soft without masses , tenderness or organomegaly.  No CVA tenderness. Well healed scar from  section.   MUSCULOSKELETAL:  Full range of motion  EXTREMITIES:  No edema. No significant varicosities.   GENITALIA:  BUS WNL, no lesions noted   VAGINA:  Pink, normal rugae and discharge normal and physiologic,   CERVIX:  smooth, without discharge or CMT and nulliparous os,   firm/ closed 4 cm long.  UTERUS: Anteverted, nontender 6 weeks in size.  ADNEXA: Without masses or tenderness  PELVIS:  Arch; wide . Sacrum; deep. Spines;blunt.  Side walls; straight. Type; gynecoid  PELVIS:   Adequate, Pelvis proven to -pelvis not tested.  RECTAL:  Normal appearance.  Digital exam deferred.  WET PREP:Not done  gonorrhea  =========================================  ASSESSMENT:  (Z34.81) Prenatal care, subsequent pregnancy in first trimester  (primary encounter diagnosis)  Comment: Estimated Date of Delivery: Mar 13, 2023   Plan: US OB <14 Weeks w Transvaginal Single,         Chlamydia trachomatis/Neisseria gonorrhoeae by         PCR, ABO/Rh type and screen, CBC with         platelets, Hepatitis B surface antigen, Rubella        Antibody IgG, HIV Antigen Antibody Combo, Urine        Culture, Hepatitis C " Screen Reflex to HCV RNA         Quant and Genotype, UA with Microscopic,         Treponema Abs w Reflex to RPR and Titer,         Progesterone, Urine Microscopic Exam            PREGNANCY AT RISK? unknown  ==========================================  PLAN:  Instructed on use of triage nurse line and contacting the on call CNM after hours for an urgent need such as fever, vagina bleeding, bladder or vaginal infection, rupture of membranes,  or term labor.    Discussed the indications, uses for and false positives for quad screen, NIPT, nuchal translucency and fetal survey ultrasound at 18-20 weeks gestation. Will inform us at the next visit if she wished to avail herself of these screens.  Instructed on best evidence for: weight gain for her BMI for pregnancy; healthy diet and foods to avoid; exercise and activity during pregnancy;avoiding exposure to toxoplasmosis; and maintenance of a generally healthy lifestyle.   Discussed the harms, benefits, side effects and alternative therapies for current prescribed and OTC medications.    Elver Jean MD

## 2022-08-04 ENCOUNTER — PRENATAL OFFICE VISIT (OUTPATIENT)
Dept: OBGYN | Facility: CLINIC | Age: 32
End: 2022-08-04

## 2022-08-04 VITALS
WEIGHT: 140.6 LBS | BODY MASS INDEX: 29.52 KG/M2 | SYSTOLIC BLOOD PRESSURE: 112 MMHG | HEIGHT: 58 IN | HEART RATE: 93 BPM | RESPIRATION RATE: 14 BRPM | TEMPERATURE: 99.1 F | DIASTOLIC BLOOD PRESSURE: 80 MMHG

## 2022-08-04 DIAGNOSIS — Z34.81 PRENATAL CARE, SUBSEQUENT PREGNANCY IN FIRST TRIMESTER: Primary | ICD-10-CM

## 2022-08-04 DIAGNOSIS — N89.8 VAGINAL DISCHARGE: ICD-10-CM

## 2022-08-04 LAB
CLUE CELLS: ABNORMAL
TRICHOMONAS, WET PREP: ABNORMAL
WBC'S/HIGH POWER FIELD, WET PREP: ABNORMAL
YEAST, WET PREP: ABNORMAL

## 2022-08-04 PROCEDURE — 87210 SMEAR WET MOUNT SALINE/INK: CPT | Performed by: OBSTETRICS & GYNECOLOGY

## 2022-08-04 PROCEDURE — 99207 PR PRENATAL VISIT: CPT | Performed by: OBSTETRICS & GYNECOLOGY

## 2022-08-04 NOTE — PROGRESS NOTES
"[facility]   OB/GYN Clinic    CC: Return OB     Subjective:    Anu is a 31 year old  at 8w3d who presents for return OB visit/viability check. She reports feeling well. Denies uterine cramping, vaginal bleeding or leaking, dysuria. Has had increase in discharge      Objective:  /80 (BP Location: Right arm, Patient Position: Chair, Cuff Size: Adult Regular)   Pulse 93   Temp 99.1  F (37.3  C) (Tympanic)   Resp 14   Ht 1.473 m (4' 10\")   Wt 63.8 kg (140 lb 9.6 oz)   LMP 2022   Breastfeeding No   BMI 29.39 kg/m      Estimated body mass index is 29.39 kg/m  as calculated from the following:    Height as of this encounter: 1.473 m (4' 10\").    Weight as of this encounter: 63.8 kg (140 lb 9.6 oz).    Physical Exam:  Gen: Pleasant, talkative female in no apparent distress   Respiratory: breathing comfortably on room air   Cardiac: Warm and well-perfused.   GI: Abd soft and non-tender, gravid  MSK: Grossly normal movement of all four extremities  Psych: mood and affect bright   Lower extremity: edema not present     Transvaginal US: Single IUP measuring 8w2d c/w previous US.     Assessment/Plan:   31 year old  at 8w3d who presents for follow-up OB visit.   1) New OB lab; T&S, CBC, HIV, RPR, HepBsAg, Hep B antibody, rubella, GC/Chlam WNL. Plan for 3rd tri lab at 28wks.   2) anatomy scan at 20w  3) Hx of CS x1  4) Medication review: no changes, continue prenatal vitamin   5) vaginal discharge- wet prep WNL    Return to clinic in 4 weeks.     Desiree Rees MD   2022 11:46 AM     "

## 2022-08-04 NOTE — NURSING NOTE
"Initial /80 (BP Location: Right arm, Patient Position: Chair, Cuff Size: Adult Regular)   Pulse 93   Temp 99.1  F (37.3  C) (Tympanic)   Resp 14   Ht 1.473 m (4' 10\")   Wt 63.8 kg (140 lb 9.6 oz)   LMP 06/09/2022   Breastfeeding No   BMI 29.39 kg/m   Estimated body mass index is 29.39 kg/m  as calculated from the following:    Height as of this encounter: 1.473 m (4' 10\").    Weight as of this encounter: 63.8 kg (140 lb 9.6 oz). .    Cookie Dumont, ALLI    "

## 2022-09-02 ENCOUNTER — MEDICAL CORRESPONDENCE (OUTPATIENT)
Dept: OBGYN | Facility: CLINIC | Age: 32
End: 2022-09-02

## 2022-09-02 ENCOUNTER — PRENATAL OFFICE VISIT (OUTPATIENT)
Dept: OBGYN | Facility: CLINIC | Age: 32
End: 2022-09-02
Payer: COMMERCIAL

## 2022-09-02 VITALS
HEIGHT: 58 IN | RESPIRATION RATE: 18 BRPM | BODY MASS INDEX: 29.6 KG/M2 | DIASTOLIC BLOOD PRESSURE: 55 MMHG | WEIGHT: 141 LBS | TEMPERATURE: 98.7 F | SYSTOLIC BLOOD PRESSURE: 105 MMHG | HEART RATE: 95 BPM

## 2022-09-02 DIAGNOSIS — Z3A.12 12 WEEKS GESTATION OF PREGNANCY: Primary | ICD-10-CM

## 2022-09-02 PROCEDURE — 99207 PR PRENATAL VISIT: CPT | Performed by: OBSTETRICS & GYNECOLOGY

## 2022-09-02 PROCEDURE — 36415 COLL VENOUS BLD VENIPUNCTURE: CPT | Performed by: OBSTETRICS & GYNECOLOGY

## 2022-09-02 NOTE — NURSING NOTE
"Initial /55 (BP Location: Left arm, Patient Position: Chair, Cuff Size: Adult Regular)   Pulse 95   Temp 98.7  F (37.1  C) (Tympanic)   Resp 18   Ht 1.473 m (4' 10\")   Wt 64 kg (141 lb)   LMP 06/09/2022   Breastfeeding No   BMI 29.47 kg/m   Estimated body mass index is 29.47 kg/m  as calculated from the following:    Height as of this encounter: 1.473 m (4' 10\").    Weight as of this encounter: 64 kg (141 lb). .      "

## 2022-09-02 NOTE — ADDENDUM NOTE
Addended by: TEDDY BURNETTE on: 9/2/2022 02:58 PM     Modules accepted: Orders    
Spontaneous, unlabored and symmetrical

## 2022-09-02 NOTE — PROGRESS NOTES
"[facility] OB/GYN Clinic    Return OB Note    CC: Return OB     Subjective:  Anu is a 31 year old  at 12w4d   Denies vaginal bleeding, loss of fluid, or pelvic cramping.   Complaints today: nausea.  Able to keep food and fluid down after first morning emesis    Objective:  /55 (BP Location: Left arm, Patient Position: Chair, Cuff Size: Adult Regular)   Pulse 95   Temp 98.7  F (37.1  C) (Tympanic)   Resp 18   Ht 1.473 m (4' 10\")   Wt 64 kg (141 lb)   LMP 2022   Breastfeeding No   BMI 29.47 kg/m      See flowsheet    Assessment/Plan:     31 year old  at 12w4d   Encounter Diagnosis   Name Primary?     12 weeks gestation of pregnancy Yes       Discussed genetic testing.  She does want to do NIPT, but doesn't want to know gender  Prior  section discussed  vs TOLAC and gave written information.  She feels she would prefer to do a repeat  section as her first daughter was small and \"got stuck\".       RTC 4 weeks or prn    Nicolle Ferris MD  "

## 2022-09-12 LAB — SCANNED LAB RESULT: NORMAL

## 2022-09-12 NOTE — RESULT ENCOUNTER NOTE
The attached results were normal. Please follow any recommendations discussed in clinic.    Nicolle Ferris MD          9/12/2022 10:35 AM

## 2022-09-17 ENCOUNTER — HEALTH MAINTENANCE LETTER (OUTPATIENT)
Age: 32
End: 2022-09-17

## 2022-09-30 ENCOUNTER — PRENATAL OFFICE VISIT (OUTPATIENT)
Dept: OBGYN | Facility: CLINIC | Age: 32
End: 2022-09-30
Payer: COMMERCIAL

## 2022-09-30 VITALS
HEART RATE: 85 BPM | BODY MASS INDEX: 30.51 KG/M2 | SYSTOLIC BLOOD PRESSURE: 96 MMHG | WEIGHT: 146 LBS | DIASTOLIC BLOOD PRESSURE: 65 MMHG | TEMPERATURE: 98.6 F

## 2022-09-30 DIAGNOSIS — Z34.82 PRENATAL CARE, SUBSEQUENT PREGNANCY IN SECOND TRIMESTER: Primary | ICD-10-CM

## 2022-09-30 DIAGNOSIS — Z23 NEED FOR PROPHYLACTIC VACCINATION AND INOCULATION AGAINST INFLUENZA: ICD-10-CM

## 2022-09-30 PROCEDURE — 90686 IIV4 VACC NO PRSV 0.5 ML IM: CPT | Performed by: OBSTETRICS & GYNECOLOGY

## 2022-09-30 PROCEDURE — 90471 IMMUNIZATION ADMIN: CPT | Performed by: OBSTETRICS & GYNECOLOGY

## 2022-09-30 PROCEDURE — 99207 PR PRENATAL VISIT: CPT | Performed by: OBSTETRICS & GYNECOLOGY

## 2022-09-30 NOTE — NURSING NOTE
"Initial BP 96/65 (BP Location: Left arm, Patient Position: Chair, Cuff Size: Adult Large)   Pulse 85   Temp 98.6  F (37  C) (Tympanic)   Wt 66.2 kg (146 lb)   LMP 06/09/2022   Breastfeeding No   BMI 30.51 kg/m   Estimated body mass index is 30.51 kg/m  as calculated from the following:    Height as of 9/2/22: 1.473 m (4' 10\").    Weight as of this encounter: 66.2 kg (146 lb). .    Addis Borges MA    "

## 2022-09-30 NOTE — PROGRESS NOTES
[facility]   OB/GYN Clinic     CC: Return OB      Subjective:     Anu is a 31 year old  at 16w4d who presents for return OB visit/viability check. She reports feeling well. Denies uterine cramping, vaginal bleeding or leaking, dysuria. Cont to have mild nausea. ?FM        Objective:  BP 96/65 (BP Location: Left arm, Patient Position: Chair, Cuff Size: Adult Large)   Pulse 85   Temp 98.6  F (37  C) (Tympanic)   Wt 66.2 kg (146 lb)   LMP 2022   Breastfeeding No   BMI 30.51 kg/m        Physical Exam:  Gen: Pleasant, talkative female in no apparent distress   Respiratory: breathing comfortably on room air   Cardiac: Warm and well-perfused.   GI: Abd soft and non-tender, gravid  MSK: Grossly normal movement of all four extremities  Psych: mood and affect bright   Lower extremity: edema not present      FHT 160s     Assessment/Plan:   31 year old  at 16w4d who presents for follow-up OB visit.   1) New OB lab; T&S, CBC, HIV, RPR, HepBsAg, Hep B antibody, rubella, GC/Chlam WNL. Plan for 3rd tri lab at 28wks.   2) anatomy scan at 20w  3) Hx of CS x1  4) Medication review: no changes, continue prenatal vitamin         Return to clinic in 4 weeks.     Desiree Rees MD   2022 2:55 PM

## 2022-10-28 ENCOUNTER — HOSPITAL ENCOUNTER (OUTPATIENT)
Dept: ULTRASOUND IMAGING | Facility: CLINIC | Age: 32
Discharge: HOME | End: 2022-10-28
Attending: OBSTETRICS & GYNECOLOGY | Admitting: OBSTETRICS & GYNECOLOGY
Payer: COMMERCIAL

## 2022-10-28 ENCOUNTER — PRENATAL OFFICE VISIT (OUTPATIENT)
Dept: OBGYN | Facility: CLINIC | Age: 32
End: 2022-10-28
Payer: COMMERCIAL

## 2022-10-28 VITALS
SYSTOLIC BLOOD PRESSURE: 112 MMHG | RESPIRATION RATE: 16 BRPM | HEIGHT: 58 IN | TEMPERATURE: 98.7 F | DIASTOLIC BLOOD PRESSURE: 68 MMHG | HEART RATE: 96 BPM | BODY MASS INDEX: 32.07 KG/M2 | WEIGHT: 152.8 LBS

## 2022-10-28 DIAGNOSIS — Z34.92 PRENATAL CARE, SECOND TRIMESTER: Primary | ICD-10-CM

## 2022-10-28 DIAGNOSIS — Z34.82 PRENATAL CARE, SUBSEQUENT PREGNANCY IN SECOND TRIMESTER: ICD-10-CM

## 2022-10-28 PROCEDURE — 76805 OB US >/= 14 WKS SNGL FETUS: CPT

## 2022-10-28 PROCEDURE — 99207 PR PRENATAL VISIT: CPT | Performed by: OBSTETRICS & GYNECOLOGY

## 2022-10-28 NOTE — NURSING NOTE
"Initial /68 (BP Location: Right arm, Patient Position: Chair, Cuff Size: Adult Regular)   Pulse 96   Temp 98.7  F (37.1  C) (Tympanic)   Resp 16   Ht 1.473 m (4' 10\")   Wt 69.3 kg (152 lb 12.8 oz)   LMP 06/09/2022   BMI 31.94 kg/m   Estimated body mass index is 31.94 kg/m  as calculated from the following:    Height as of this encounter: 1.473 m (4' 10\").    Weight as of this encounter: 69.3 kg (152 lb 12.8 oz). .    Cookie Dumont, ALLI    "

## 2022-10-28 NOTE — PROGRESS NOTES
"[facility]   OB/GYN Clinic     CC: Return OB      Subjective:     Anu is a 31 year old  at 20w4d who presents for return OB visit/viability check. She reports feeling well. Denies uterine cramping, vaginal bleeding or leaking, dysuria. . +FM      Objective:/68 (BP Location: Right arm, Patient Position: Chair, Cuff Size: Adult Regular)   Pulse 96   Temp 98.7  F (37.1  C) (Tympanic)   Resp 16   Ht 1.473 m (4' 10\")   Wt 69.3 kg (152 lb 12.8 oz)   LMP 2022   BMI 31.94 kg/m       Physical Exam:  Gen: Pleasant, talkative female in no apparent distress   Respiratory: breathing comfortably on room air   Cardiac: Warm and well-perfused.   GI: Abd soft and non-tender, gravid  MSK: Grossly normal movement of all four extremities  Psych: mood and affect bright   Lower extremity: edema not present      FHT deferred, normal on US today     Assessment/Plan:   31 year old  at 20w4d who presents for follow-up OB visit.   1) New OB lab; T&S, CBC, HIV, RPR, HepBsAg, Hep B antibody, rubella, GC/Chlam WNL. Plan for 3rd tri lab at 28wks.   2) anatomy scan pending today, appears WNL  3) Hx of CS x1  4) Medication review: no changes, continue prenatal vitamin       Return to clinic in 4 weeks.     Desiree Rees MD 10/28/2022 2:55 PM   "

## 2022-10-31 DIAGNOSIS — Z34.92 PRENATAL CARE, SECOND TRIMESTER: Primary | ICD-10-CM

## 2022-11-10 ENCOUNTER — HOSPITAL ENCOUNTER (OUTPATIENT)
Dept: ULTRASOUND IMAGING | Facility: CLINIC | Age: 32
Discharge: HOME | End: 2022-11-10
Attending: OBSTETRICS & GYNECOLOGY | Admitting: OBSTETRICS & GYNECOLOGY
Payer: COMMERCIAL

## 2022-11-10 DIAGNOSIS — Z34.92 PRENATAL CARE, SECOND TRIMESTER: ICD-10-CM

## 2022-11-10 PROCEDURE — 76816 OB US FOLLOW-UP PER FETUS: CPT

## 2022-11-25 ENCOUNTER — PRENATAL OFFICE VISIT (OUTPATIENT)
Dept: OBGYN | Facility: CLINIC | Age: 32
End: 2022-11-25
Payer: COMMERCIAL

## 2022-11-25 VITALS
WEIGHT: 160.4 LBS | HEART RATE: 99 BPM | HEIGHT: 58 IN | TEMPERATURE: 98 F | DIASTOLIC BLOOD PRESSURE: 63 MMHG | RESPIRATION RATE: 16 BRPM | BODY MASS INDEX: 33.67 KG/M2 | SYSTOLIC BLOOD PRESSURE: 117 MMHG

## 2022-11-25 DIAGNOSIS — Z34.82 PRENATAL CARE, SUBSEQUENT PREGNANCY IN SECOND TRIMESTER: Primary | ICD-10-CM

## 2022-11-25 DIAGNOSIS — Z98.891 HISTORY OF C-SECTION: ICD-10-CM

## 2022-11-25 LAB
ERYTHROCYTE [DISTWIDTH] IN BLOOD BY AUTOMATED COUNT: 12.7 % (ref 10–15)
GLUCOSE 1H P 50 G GLC PO SERPL-MCNC: 127 MG/DL (ref 70–129)
HCT VFR BLD AUTO: 34.2 % (ref 35–47)
HGB BLD-MCNC: 11.1 G/DL (ref 11.7–15.7)
MCH RBC QN AUTO: 30.1 PG (ref 26.5–33)
MCHC RBC AUTO-ENTMCNC: 32.5 G/DL (ref 31.5–36.5)
MCV RBC AUTO: 93 FL (ref 78–100)
PLATELET # BLD AUTO: 301 10E3/UL (ref 150–450)
RBC # BLD AUTO: 3.69 10E6/UL (ref 3.8–5.2)
WBC # BLD AUTO: 10 10E3/UL (ref 4–11)

## 2022-11-25 PROCEDURE — 82950 GLUCOSE TEST: CPT | Performed by: ADVANCED PRACTICE MIDWIFE

## 2022-11-25 PROCEDURE — 85027 COMPLETE CBC AUTOMATED: CPT | Performed by: ADVANCED PRACTICE MIDWIFE

## 2022-11-25 PROCEDURE — 99207 PR PRENATAL VISIT: CPT | Performed by: ADVANCED PRACTICE MIDWIFE

## 2022-11-25 PROCEDURE — 36415 COLL VENOUS BLD VENIPUNCTURE: CPT | Performed by: ADVANCED PRACTICE MIDWIFE

## 2022-11-25 PROCEDURE — 86780 TREPONEMA PALLIDUM: CPT | Performed by: ADVANCED PRACTICE MIDWIFE

## 2022-11-25 PROCEDURE — 59425 ANTEPARTUM CARE ONLY: CPT | Performed by: ADVANCED PRACTICE MIDWIFE

## 2022-11-25 NOTE — PROGRESS NOTES
Feeling well.  Baby is active. Denies any leaking of fluid, vaginal bleeding, regular uterine contractions, or headaches or other concerns.  GCT and labs today.  History of . Thinking about a /TLAC.  We discussed that people usually go to [facility] for a consult with the OB MDs and then care with the MDs or CNMs.    Reviewed to call for contractions, loss of fluid, vaginal bleeding, decreased fetal movement or any other questions or concerns.    RTC in 4 weeks.  Lara Herrera, JOEL, APRN, CNM

## 2022-11-26 LAB — T PALLIDUM AB SER QL: NONREACTIVE

## 2022-11-30 ENCOUNTER — MYC MEDICAL ADVICE (OUTPATIENT)
Dept: OBGYN | Facility: CLINIC | Age: 32
End: 2022-11-30

## 2022-11-30 DIAGNOSIS — Z34.82 PRENATAL CARE, SUBSEQUENT PREGNANCY IN SECOND TRIMESTER: Primary | ICD-10-CM

## 2022-11-30 DIAGNOSIS — R10.11 RUQ ABDOMINAL PAIN: ICD-10-CM

## 2022-12-01 NOTE — TELEPHONE ENCOUNTER
S-(situation): RUQ pain that comes and goes and is often triggered by food    B-(background):  at 25w3d    A-(assessment): pain at worst is 4/10 but patient reports high pain tolerance.  Patient denies headaches, visual changes or swelling.    R-(recommendations): Patient going to keep a food journal to see if anything in particular is triggering this.     Any other recommendations?    Thanks    Analilia Ziegler   Ob/Gyn Clinic  RN

## 2022-12-02 NOTE — TELEPHONE ENCOUNTER
Is she taking anything for GERD? Would recommend 20 mg Pepcid twice daily if not. Can you also order liver enzyme and lipase for her to come in and complete if pain is still ongoing?       Desiree Rees MD      [employer] message sent.  Labs ordered.    Analilia Ziegler   Ob/Gyn Clinic  RN

## 2022-12-02 NOTE — TELEPHONE ENCOUNTER
Is she taking anything for GERD? Would recommend 20 mg Pepcid twice daily if not. Can you also order liver enzyme and lipase for her to come in and complete if pain is still ongoing?     Desiree Rees MD

## 2023-01-06 ENCOUNTER — PRENATAL OFFICE VISIT (OUTPATIENT)
Dept: OBGYN | Facility: CLINIC | Age: 33
End: 2023-01-06
Payer: COMMERCIAL

## 2023-01-06 VITALS
WEIGHT: 170.4 LBS | HEART RATE: 110 BPM | RESPIRATION RATE: 14 BRPM | SYSTOLIC BLOOD PRESSURE: 107 MMHG | DIASTOLIC BLOOD PRESSURE: 62 MMHG | BODY MASS INDEX: 35.77 KG/M2 | TEMPERATURE: 98.4 F | HEIGHT: 58 IN

## 2023-01-06 DIAGNOSIS — Z34.83 PRENATAL CARE, SUBSEQUENT PREGNANCY IN THIRD TRIMESTER: Primary | ICD-10-CM

## 2023-01-06 PROCEDURE — 99207 PR PRENATAL VISIT: CPT | Performed by: OBSTETRICS & GYNECOLOGY

## 2023-01-06 NOTE — PROGRESS NOTES
"Initial /62 (BP Location: Left arm, Patient Position: Chair, Cuff Size: Adult Regular)   Pulse 110   Temp 98.4  F (36.9  C) (Tympanic)   Resp 14   Ht 1.473 m (4' 10\")   Wt 77.3 kg (170 lb 6.4 oz)   LMP 06/09/2022   BMI 35.61 kg/m   Estimated body mass index is 35.61 kg/m  as calculated from the following:    Height as of this encounter: 1.473 m (4' 10\").    Weight as of this encounter: 77.3 kg (170 lb 6.4 oz). .      "

## 2023-01-06 NOTE — PROGRESS NOTES
"[facility]   OB/GYN Clinic     CC: Return OB      Subjective:     Anu is a 31 year old  at 30w4d who presents for return OB visit/viability check. She reports feeling well. Denies uterine cramping, vaginal bleeding or leaking, dysuria. . +FM      Objective:  /62 (BP Location: Left arm, Patient Position: Chair, Cuff Size: Adult Regular)   Pulse 110   Temp 98.4  F (36.9  C) (Tympanic)   Resp 14   Ht 1.473 m (4' 10\")   Wt 77.3 kg (170 lb 6.4 oz)   LMP 2022   BMI 35.61 kg/m      Physical Exam:  Gen: Pleasant, talkative female in no apparent distress   Respiratory: breathing comfortably on room air   Cardiac: Warm and well-perfused.   GI: Abd soft and non-tender, gravid  MSK: Grossly normal movement of all four extremities  Psych: mood and affect bright   Lower extremity: edema not present      See OB flowsheet      Assessment/Plan:   31 year old  at 30w4d who presents for follow-up OB visit.   1) New OB lab; T&S, CBC, HIV, RPR, HepBsAg, Hep B antibody, rubella, GC/Chlam WNL. Normal 3rd tri labs.   2) anatomy scan wnl  3) Hx of CS x1; reviewed delivery option including rpeat c/s scheduled at 39wks typically or trial of labor after  (TOLAC). Reviewed risks of TOLAC including 1% risk of uterine rupture, that carries high risk of maternal hemorrhage, intraabdominal and fetal injury or death. Reviewed risk of repeat c/s including bleeding, infection and intraabdominal injury as well as expected recovery with each. Discussed need for delivery at sister site if she desired TOLAC.   4) Medication review: no changes, continue prenatal vitamin       Return to clinic in 2 weeks. Labor and FM precautions reviewed.   Jinny Currie MD  OB/GYN       "

## 2023-01-10 ENCOUNTER — TELEPHONE (OUTPATIENT)
Dept: OBGYN | Facility: CLINIC | Age: 33
End: 2023-01-10

## 2023-01-10 NOTE — TELEPHONE ENCOUNTER
Reason for call:  Other   Patient called regarding (reason for call): C/S scheduling  Additional comments: Pt called to schedule repeat c/s. Explained that the surgery scheduler would reach out to her once a request has been submitted by provider. Told pt that someone would reach out to her in the next few days but would send a message to help get the process started.     Phone number to reach patient:  Home number on file [phone] (home)    Best Time:  Any    Can we leave a detailed message on this number?  YES    Travel screening: Not Applicable

## 2023-01-11 NOTE — TELEPHONE ENCOUNTER
Called pt back to discuss when she wants to schedule .  She would like Desiree Rees MD to do the surgery on 3-8-23.  Will send to provider for orders.  Time held at 10:30 on 3-8.    Please advise.    Thanks-    -Nanette Lowe  [place] [place]

## 2023-01-12 ENCOUNTER — PREP FOR PROCEDURE (OUTPATIENT)
Dept: OBGYN | Facility: CLINIC | Age: 33
End: 2023-01-12

## 2023-01-12 DIAGNOSIS — Z98.891 HISTORY OF CESAREAN DELIVERY: Primary | ICD-10-CM

## 2023-01-12 RX ORDER — CARBOPROST TROMETHAMINE 250 UG/ML
250 INJECTION, SOLUTION INTRAMUSCULAR
Status: CANCELLED | OUTPATIENT
Start: 2023-01-12

## 2023-01-12 RX ORDER — OXYTOCIN 10 [USP'U]/ML
10 INJECTION, SOLUTION INTRAMUSCULAR; INTRAVENOUS
Status: CANCELLED | OUTPATIENT
Start: 2023-01-12

## 2023-01-12 RX ORDER — MISOPROSTOL 200 UG/1
800 TABLET ORAL
Status: CANCELLED | OUTPATIENT
Start: 2023-01-12

## 2023-01-12 RX ORDER — SODIUM CHLORIDE, SODIUM LACTATE, POTASSIUM CHLORIDE, CALCIUM CHLORIDE 600; 310; 30; 20 MG/100ML; MG/100ML; MG/100ML; MG/100ML
INJECTION, SOLUTION INTRAVENOUS CONTINUOUS
Status: CANCELLED | OUTPATIENT
Start: 2023-01-12

## 2023-01-12 RX ORDER — MISOPROSTOL 100 UG/1
400 TABLET ORAL
Status: CANCELLED | OUTPATIENT
Start: 2023-01-12

## 2023-01-12 RX ORDER — OXYTOCIN/0.9 % SODIUM CHLORIDE 30/500 ML
100-340 PLASTIC BAG, INJECTION (ML) INTRAVENOUS CONTINUOUS PRN
Status: CANCELLED | OUTPATIENT
Start: 2023-01-12

## 2023-01-12 RX ORDER — LIDOCAINE 40 MG/G
CREAM TOPICAL
Status: CANCELLED | OUTPATIENT
Start: 2023-01-12

## 2023-01-12 RX ORDER — OXYTOCIN/0.9 % SODIUM CHLORIDE 30/500 ML
340 PLASTIC BAG, INJECTION (ML) INTRAVENOUS CONTINUOUS PRN
Status: CANCELLED | OUTPATIENT
Start: 2023-01-12

## 2023-01-12 RX ORDER — METHYLERGONOVINE MALEATE 0.2 MG/ML
200 INJECTION INTRAVENOUS
Status: CANCELLED | OUTPATIENT
Start: 2023-01-12

## 2023-01-12 RX ORDER — CITRIC ACID/SODIUM CITRATE 334-500MG
30 SOLUTION, ORAL ORAL
Status: CANCELLED | OUTPATIENT
Start: 2023-01-12

## 2023-01-12 RX ORDER — ACETAMINOPHEN 325 MG/1
975 TABLET ORAL ONCE
Status: CANCELLED | OUTPATIENT
Start: 2023-01-12 | End: 2023-01-12

## 2023-01-12 NOTE — TELEPHONE ENCOUNTER
"[id number]  Anu Davis    You are now scheduled for surgery at [facility].  Below are the details for your surgery.  Please read the \"Preparing for Your Surgery\" instructions and let us know if you have any questions.    Type of surgery:  SECTION    Surgeon:  Desiree Rees MD  Location of surgery: [facility] OR    Date of surgery: 3-8-23    Time: 10:30am   Arrival Time: 9:00am    Time can change, to be confirmed a couple of days prior by pre-op surgery nurse.    Pre-Op Appt Date: Last OB visit  Post-Op Appt Date:    Packet sent out: Yes at next appt  Pre-cert/Authorization completed:  TBD by Financial Securing Office.   MA Sterilization/Hysterectomy Acknowledgment Consent signed: Not Applicable    [facility]  [phone]    Fax: [phone]  Same Day Surgery [phone]  Fax: [phone]  [facility] [phone]    "

## 2023-01-20 ENCOUNTER — PRENATAL OFFICE VISIT (OUTPATIENT)
Dept: OBGYN | Facility: CLINIC | Age: 33
End: 2023-01-20
Payer: COMMERCIAL

## 2023-01-20 VITALS
RESPIRATION RATE: 18 BRPM | HEIGHT: 72 IN | BODY MASS INDEX: 23.3 KG/M2 | WEIGHT: 172 LBS | SYSTOLIC BLOOD PRESSURE: 107 MMHG | TEMPERATURE: 98 F | DIASTOLIC BLOOD PRESSURE: 61 MMHG | HEART RATE: 95 BPM

## 2023-01-20 DIAGNOSIS — Z98.891 HISTORY OF CESAREAN DELIVERY: Primary | ICD-10-CM

## 2023-01-20 DIAGNOSIS — Z34.83 PRENATAL CARE, SUBSEQUENT PREGNANCY IN THIRD TRIMESTER: ICD-10-CM

## 2023-01-20 PROCEDURE — 90715 TDAP VACCINE 7 YRS/> IM: CPT | Performed by: ADVANCED PRACTICE MIDWIFE

## 2023-01-20 PROCEDURE — 99207 PR PRENATAL VISIT: CPT | Performed by: ADVANCED PRACTICE MIDWIFE

## 2023-01-20 PROCEDURE — 90471 IMMUNIZATION ADMIN: CPT | Performed by: ADVANCED PRACTICE MIDWIFE

## 2023-01-20 NOTE — NURSING NOTE
"Initial /61 (BP Location: Right arm, Patient Position: Chair, Cuff Size: Adult Regular)   Pulse 95   Temp 98  F (36.7  C) (Tympanic)   Resp 18   Ht 2.083 m (6' 10\")   Wt 78 kg (172 lb)   LMP 06/09/2022   BMI 17.98 kg/m   Estimated body mass index is 17.98 kg/m  as calculated from the following:    Height as of this encounter: 2.083 m (6' 10\").    Weight as of this encounter: 78 kg (172 lb). .      "

## 2023-01-20 NOTE — PROGRESS NOTES
Prior to immunization administration, verified patients identity using patient s name and date of birth. Please see Immunization Activity for additional information.     Screening Questionnaire for Adult Immunization    Are you sick today?   No   Do you have allergies to medications, food, a vaccine component or latex?   No   Have you ever had a serious reaction after receiving a vaccination?   No   Do you have a long-term health problem with heart, lung, kidney, or metabolic disease (e.g., diabetes), asthma, a blood disorder, no spleen, complement component deficiency, a cochlear implant, or a spinal fluid leak?  Are you on long-term aspirin therapy?   No   Do you have cancer, leukemia, HIV/AIDS, or any other immune system problem?   No   Do you have a parent, brother, or sister with an immune system problem?   No   In the past 3 months, have you taken medications that affect  your immune system, such as prednisone, other steroids, or anticancer drugs; drugs for the treatment of rheumatoid arthritis, Crohn s disease, or psoriasis; or have you had radiation treatments?   No   Have you had a seizure, or a brain or other nervous system problem?   No   During the past year, have you received a transfusion of blood or blood    products, or been given immune (gamma) globulin or antiviral drug?   No   For women: Are you pregnant or is there a chance you could become       pregnant during the next month?   No   Have you received any vaccinations in the past 4 weeks?   No     Immunization questionnaire answers were all negative.        Per orders of  Kinga, injection of adacelgiven by Stacy Cash CMA. Patient instructed to remain in clinic for 15 minutes afterwards, and to report any adverse reaction to me immediately.       Screening performed by Stacy Cash CMA on 1/20/2023 at 2:04 PM.

## 2023-01-20 NOTE — PROGRESS NOTES
Feeling well.  Baby is active. Denies any leaking of fluid, vaginal bleeding, regular uterine contractions, or headaches or other concerns.  History of .  She has her repeat  scheduled.    TDAP today.    Reviewed to call for contractions, loss of fluid, vaginal bleeding, decreased fetal movement or any other questions or concerns.    RTC in 2 weeks.  Lara Herrera, JOEL, APRN, CNM

## 2023-02-03 ENCOUNTER — PRENATAL OFFICE VISIT (OUTPATIENT)
Dept: OBGYN | Facility: CLINIC | Age: 33
End: 2023-02-03
Payer: COMMERCIAL

## 2023-02-03 VITALS
HEIGHT: 58 IN | RESPIRATION RATE: 16 BRPM | DIASTOLIC BLOOD PRESSURE: 65 MMHG | TEMPERATURE: 97.7 F | WEIGHT: 173.4 LBS | HEART RATE: 100 BPM | BODY MASS INDEX: 36.4 KG/M2 | SYSTOLIC BLOOD PRESSURE: 117 MMHG

## 2023-02-03 DIAGNOSIS — Z3A.34 34 WEEKS GESTATION OF PREGNANCY: Primary | ICD-10-CM

## 2023-02-03 PROCEDURE — 99207 PR PRENATAL VISIT: CPT | Performed by: OBSTETRICS & GYNECOLOGY

## 2023-02-03 NOTE — NURSING NOTE
"Initial /65 (BP Location: Left arm, Patient Position: Chair, Cuff Size: Adult Regular)   Pulse 100   Temp 97.7  F (36.5  C) (Tympanic)   Resp 16   Ht 1.473 m (4' 10\")   Wt 78.7 kg (173 lb 6.4 oz)   LMP 06/09/2022   BMI 36.24 kg/m   Estimated body mass index is 36.24 kg/m  as calculated from the following:    Height as of this encounter: 1.473 m (4' 10\").    Weight as of this encounter: 78.7 kg (173 lb 6.4 oz). .    Cookie Dumont, ALLI    "

## 2023-02-03 NOTE — PROGRESS NOTES
"[facility]    Return OB Note    CC: Return OB     Subjective:  Anu is a 32 year old   at 34w4d   Denies vaginal bleeding, loss of fluid, or regular contractions. Pos fetal movement. No headache, visual disturbance or RUQ pain.  Complaints today: none    Objective:  /65 (BP Location: Left arm, Patient Position: Chair, Cuff Size: Adult Regular)   Pulse 100   Temp 97.7  F (36.5  C) (Tympanic)   Resp 16   Ht 1.473 m (4' 10\")   Wt 78.7 kg (173 lb 6.4 oz)   LMP 2022   BMI 36.24 kg/m      See flowsheet      Assessment/Plan:       32 year old year old  female with IUP at 34w4d  Encounter Diagnosis   Name Primary?     34 weeks gestation of pregnancy Yes        1) New OB lab; T&S, CBC, HIV, RPR, HepBsAg, Hep B antibody, rubella, GC/Chlam WNL. Normal 3rd tri labs.   2) anatomy scan wnl  3) Hx of CS x1; planning rpeat c/s scheduled at 39wks, scheduled    Return precautions given  Discussed labor, rupture of membranes and preeclampsia precautions.  Discussed fetal movement precautions.    RTC 2 weeks    Nicolle Ferris MD  "

## 2023-02-09 ENCOUNTER — HOSPITAL ENCOUNTER (OUTPATIENT)
Facility: CLINIC | Age: 33
Discharge: HOME | End: 2023-02-09
Attending: OBSTETRICS & GYNECOLOGY | Admitting: OBSTETRICS & GYNECOLOGY
Payer: OTHER MISCELLANEOUS

## 2023-02-09 VITALS
RESPIRATION RATE: 16 BRPM | DIASTOLIC BLOOD PRESSURE: 74 MMHG | SYSTOLIC BLOOD PRESSURE: 117 MMHG | HEART RATE: 100 BPM | TEMPERATURE: 98 F

## 2023-02-09 PROCEDURE — 59025 FETAL NON-STRESS TEST: CPT

## 2023-02-09 PROCEDURE — G0463 HOSPITAL OUTPT CLINIC VISIT: HCPCS | Mod: 25

## 2023-02-09 PROCEDURE — 59025 FETAL NON-STRESS TEST: CPT | Mod: 26 | Performed by: OBSTETRICS & GYNECOLOGY

## 2023-02-09 RX ORDER — LIDOCAINE 40 MG/G
CREAM TOPICAL
Status: DISCONTINUED | OUTPATIENT
Start: 2023-02-09 | End: 2023-02-09 | Stop reason: HOSPADM

## 2023-02-09 ASSESSMENT — ACTIVITIES OF DAILY LIVING (ADL)
ADLS_ACUITY_SCORE: 35
ADLS_ACUITY_SCORE: 35

## 2023-02-09 NOTE — PROGRESS NOTES
S: NST extended but reactive, variable decel noted   B: 35+3, fell at 10:30, scheduled for c/section on 3/8.  A: Mild contractions every 2-4 minutes, Dr Jean updated  R: Will monitor for 2 hours.

## 2023-02-09 NOTE — PROGRESS NOTES
S:Patient presents due to a fall on the ice at 10:30 am. Patient states she landed on her back and side.  Patient denies vaginal leaking or bleeding. Patient is having mild contractions and is not aware, denies pain. Abdomen soft, non-tender.  B:35w3d   Allergies: Bees  A:A: moderate variablility, + accels, no decels, Category I      Dr. R. Magnusson called and orders received.  Plan includes; Monitor, observe and reevaluate. Reviewed with patient and she agrees with plan.         Questions answered.    Oriented to room and call light.

## 2023-02-09 NOTE — PROGRESS NOTES
S: Discharge from triage  A: A:FHT 140moderate variablility, + accels, no decels, Category I  normal uterine activity  Strip reviewed by Nadine CRAIN RN.    No visits with results within 1 Day(s) from this visit.   Latest known visit with results is:   Prenatal Office Visit on 2022   Component Date Value     WBC Count 2022 10.0      RBC Count 2022 3.69 (L)      Hemoglobin 2022 11.1 (L)      Hematocrit 2022 34.2 (L)      MCV 2022 93      MCH 2022 30.1      MCHC 2022 32.5      RDW 2022 12.7      Platelet Count 2022 301      Glu Gest Screen 1hr 50g 2022 127      Treponema Antibody Total 2022 Nonreactive      Dr. PAVEL Jean informed of above and discharge order received.   R: Plan includes:  labor instuctions given Fetal kick count instructions given. Follow up clinic appointment: as scheduled. Patient instructed to report any recurrence of above concerns to her primary care provider during clinic hours or [facility] at any other time. Patient verbalized understanding of After Visit Summary, education and agreement with plan. Agrees to call for any problems, questions or concerns.  Discharged undelivered via ambulatory  in stable condition with all belongings. Accompanied by mother.

## 2023-02-09 NOTE — DISCHARGE INSTRUCTIONS
Discharge Instructions for Undelivered Patients  Triage #[phone], Clinic #[phone], [facility] Phone # [phone]    Diet:  * Drink 8 to 12 glasses of liquids (milk, juice, water) every day  * You may eat meals and snacks.    Activity:  * Rest the pelvic area.   *Rest often on your side.  * Count fetal kicks every day (see handout).  * Call your doctor or nurse midwife if your baby is moving less than usual.    Call your provider if you notice:  * Swelling in your face or increased swelling in your hands or legs.  * Headaches that are not relieved by Tylenol (acetaminophen).  * Changes in your vision (blurring; seeing spots or stars).  * Nausea (sick to your stomach) and vomiting (throwing up).  * Weight gain of 5 pounds per week.  * Heartburn that doesn't go away.  * Signs of bladder infection: Pain when you urinate (use the toilet), needing to go more often or more urgently.  * The bag of todd (membrane) breaks, or you notice leaking in your underwear.  * Bright red blood in your underwear.  * Abdominal (lower belly) or stomach pain.  * For first baby: Contractions (tightenings) less than 5 minutes apart for one hour or more.  * Second (plus) baby: Contractions (tightenings) less than 10 minutes apart and getting stronger.  * Increase or change in vaginal discharge (note the color and amount).    Schedule follow up appointment with [facility]    ***

## 2023-02-17 ENCOUNTER — PRENATAL OFFICE VISIT (OUTPATIENT)
Dept: OBGYN | Facility: CLINIC | Age: 33
End: 2023-02-17
Payer: COMMERCIAL

## 2023-02-17 VITALS
HEART RATE: 118 BPM | RESPIRATION RATE: 18 BRPM | BODY MASS INDEX: 37.07 KG/M2 | TEMPERATURE: 98.5 F | HEIGHT: 58 IN | SYSTOLIC BLOOD PRESSURE: 119 MMHG | DIASTOLIC BLOOD PRESSURE: 72 MMHG | WEIGHT: 176.6 LBS

## 2023-02-17 DIAGNOSIS — Z3A.36 36 WEEKS GESTATION OF PREGNANCY: Primary | ICD-10-CM

## 2023-02-17 PROCEDURE — 87653 STREP B DNA AMP PROBE: CPT | Performed by: OBSTETRICS & GYNECOLOGY

## 2023-02-17 PROCEDURE — 99207 PR PRENATAL VISIT: CPT | Performed by: OBSTETRICS & GYNECOLOGY

## 2023-02-17 NOTE — NURSING NOTE
"Initial /72 (BP Location: Right arm, Patient Position: Chair, Cuff Size: Adult Regular)   Pulse 118   Temp 98.5  F (36.9  C) (Tympanic)   Resp 18   Ht 1.473 m (4' 10\")   Wt 80.1 kg (176 lb 9.6 oz)   LMP 06/09/2022   BMI 36.91 kg/m   Estimated body mass index is 36.91 kg/m  as calculated from the following:    Height as of this encounter: 1.473 m (4' 10\").    Weight as of this encounter: 80.1 kg (176 lb 9.6 oz). .    Cookie Dumont, ALLI    "

## 2023-02-17 NOTE — PROGRESS NOTES
"[facility]    Return OB Note    CC: Return OB     Subjective:  Anu is a 32 year old   at 36w4d   Denies vaginal bleeding, loss of fluid, or regular contractions. Pos fetal movement. No headache, visual disturbance or RUQ pain.  Complaints today: trina wade can be tiring.    Objective:  /72 (BP Location: Right arm, Patient Position: Chair, Cuff Size: Adult Regular)   Pulse 118   Temp 98.5  F (36.9  C) (Tympanic)   Resp 18   Ht 1.473 m (4' 10\")   Wt 80.1 kg (176 lb 9.6 oz)   LMP 2022   BMI 36.91 kg/m      See flowsheet      Assessment/Plan:       32 year old year old  female with IUP at 36w4d  Encounter Diagnosis   Name Primary?     36 weeks gestation of pregnancy Yes         1) New OB lab; T&S, CBC, HIV, RPR, HepBsAg, Hep B antibody, rubella, GC/Chlam WNL. Normal 3rd tri labs.   2) anatomy scan wnl  3) Hx of CS x1; planning rpeat c/s scheduled at 39wks, scheduled  4) GBS done today.    Return precautions given  Discussed labor, rupture of membranes and preeclampsia precautions.  Discussed fetal movement precautions.    RTC 1 weeks    Nicolle Ferris MD  "

## 2023-02-18 LAB — GP B STREP DNA SPEC QL NAA+PROBE: NEGATIVE

## 2023-02-19 NOTE — RESULT ENCOUNTER NOTE
The attached results were normal. Please follow any recommendations discussed in clinic.    Nicolle Ferris MD          2/19/2023 2:11 PM

## 2023-02-24 ENCOUNTER — PRENATAL OFFICE VISIT (OUTPATIENT)
Dept: OBGYN | Facility: CLINIC | Age: 33
End: 2023-02-24
Payer: COMMERCIAL

## 2023-02-24 VITALS
WEIGHT: 178.8 LBS | BODY MASS INDEX: 37.53 KG/M2 | RESPIRATION RATE: 14 BRPM | HEIGHT: 58 IN | TEMPERATURE: 99.3 F | SYSTOLIC BLOOD PRESSURE: 123 MMHG | HEART RATE: 96 BPM | DIASTOLIC BLOOD PRESSURE: 74 MMHG

## 2023-02-24 DIAGNOSIS — Z34.83 PRENATAL CARE, SUBSEQUENT PREGNANCY IN THIRD TRIMESTER: Primary | ICD-10-CM

## 2023-02-24 PROCEDURE — 99207 PR PRENATAL VISIT: CPT | Performed by: OBSTETRICS & GYNECOLOGY

## 2023-02-24 NOTE — PROGRESS NOTES
"Initial /74 (BP Location: Left arm, Patient Position: Chair, Cuff Size: Adult Regular)   Pulse 96   Temp 99.3  F (37.4  C) (Tympanic)   Resp 14   Ht 1.473 m (4' 10\")   Wt 81.1 kg (178 lb 12.8 oz)   LMP 06/09/2022   BMI 37.37 kg/m   Estimated body mass index is 37.37 kg/m  as calculated from the following:    Height as of this encounter: 1.473 m (4' 10\").    Weight as of this encounter: 81.1 kg (178 lb 12.8 oz). .      "

## 2023-02-24 NOTE — PROGRESS NOTES
"Return OB Note     CC: Return OB      Subjective:  Anu is a 32 year old   at 37w4d presenting for regular OB f/u. Patient denies fever, chills, HA, visual changes, CP, SOB, n/v/c/d, urinary and bowel changes, dysuria, hematuria, vaginal bleeding, leakage of fluid, regular contractions or peripheral edema. Reports normal fetal movement. She is still having trina wade.  Complaints today: none     Objective:  /74 (BP Location: Left arm, Patient Position: Chair, Cuff Size: Adult Regular)   Pulse 96   Temp 99.3  F (37.4  C) (Tympanic)   Resp 14   Ht 1.473 m (4' 10\")   Wt 81.1 kg (178 lb 12.8 oz)   LMP 2022   BMI 37.37 kg/m      FH: 38cm  FHR: 152bpm     Assessment/Plan:     32 year old year old  female with IUP at 37w4d presenting for regular OB f/u. Patient is having a normal third trimester gestation.       Encounter Diagnosis   Name Primary?     37 weeks gestation of pregnancy Yes      1)  Normal 3rd trimester lab results.   2)  Anatomy scan on 11/10/22 was normal   3)  GBS was negative  4)  NST on 23 was reactive    5)  Hx of CS x1. She has a planned scheduled CS at 39 weeks of gestation.   6)  Return precautions given    RTC 1 week    Radha Mosqueda, MS3  Medical Student   [employer]     I agree with the medical student's documentation above and have edited it for accuracy. I was present for and performed the physical exam and interview of the patient myself. All medical decision-making was performed by me. If a procedure was performed, that was performed by me. Additionally, if billing is based on time, I am only using the time that I personally spent with the patient in my time statement.     Jinny Currie MD  OB/GYN       "

## 2023-03-03 ENCOUNTER — PRENATAL OFFICE VISIT (OUTPATIENT)
Dept: OBGYN | Facility: CLINIC | Age: 33
End: 2023-03-03
Payer: COMMERCIAL

## 2023-03-03 VITALS
DIASTOLIC BLOOD PRESSURE: 74 MMHG | SYSTOLIC BLOOD PRESSURE: 132 MMHG | HEART RATE: 102 BPM | BODY MASS INDEX: 37.62 KG/M2 | TEMPERATURE: 98.7 F | WEIGHT: 180 LBS

## 2023-03-03 DIAGNOSIS — Z34.93 PRENATAL CARE, THIRD TRIMESTER: Primary | ICD-10-CM

## 2023-03-03 PROCEDURE — 59425 ANTEPARTUM CARE ONLY: CPT | Performed by: OBSTETRICS & GYNECOLOGY

## 2023-03-03 PROCEDURE — 99207 PR PRENATAL VISIT: CPT | Performed by: OBSTETRICS & GYNECOLOGY

## 2023-03-03 NOTE — PROGRESS NOTES
Return OB Note     CC: Return OB      Subjective:  Anu is a 32 year old   at 38w4d presenting for regular OB f/u. +FM. No LOF, VB. Occ ctx     Objective:  /74 (BP Location: Left arm, Patient Position: Chair, Cuff Size: Adult Regular)   Pulse 102   Temp 98.7  F (37.1  C) (Tympanic)   Wt 81.6 kg (180 lb)   LMP 2022   BMI 37.62 kg/m        FH: 39  FHR: 140s     Assessment/Plan:     32 year old year old  female with IUP at 38w4d presenting for regular OB f/u. Patient is having a normal third trimester gestation.          Encounter Diagnosis   Name Primary?     37 weeks gestation of pregnancy Yes      1)  Normal 3rd trimester lab results.   2)  Anatomy scan on 11/10/22 was normal   3)  GBS was negative  4)  Hx of CS x1. She has a planned scheduled CS at 39 weeks of gestation.   5)  Return precautions given  6) prolactinoma- not planning to restart cabergoline PP given plans for lactation    Desiree Rees MD   3/3/2023 3:46 PM

## 2023-03-03 NOTE — NURSING NOTE
"Initial /74 (BP Location: Left arm, Patient Position: Chair, Cuff Size: Adult Regular)   Pulse 102   Temp 98.7  F (37.1  C) (Tympanic)   Wt 81.6 kg (180 lb)   LMP 06/09/2022   BMI 37.62 kg/m   Estimated body mass index is 37.62 kg/m  as calculated from the following:    Height as of 2/24/23: 1.473 m (4' 10\").    Weight as of this encounter: 81.6 kg (180 lb). .    Addis Borges MA    "

## 2023-03-07 ENCOUNTER — ANESTHESIA EVENT (OUTPATIENT)
Dept: SURGERY | Facility: CLINIC | Age: 33
End: 2023-03-07
Payer: COMMERCIAL

## 2023-03-07 ENCOUNTER — TELEPHONE (OUTPATIENT)
Dept: OBGYN | Facility: CLINIC | Age: 33
End: 2023-03-07
Payer: COMMERCIAL

## 2023-03-07 NOTE — TELEPHONE ENCOUNTER
Pt was called and left a message to call the [facility] to verify date/time of her  and review pre-op instructions.

## 2023-03-08 ENCOUNTER — ANESTHESIA (OUTPATIENT)
Dept: SURGERY | Facility: CLINIC | Age: 33
End: 2023-03-08
Payer: COMMERCIAL

## 2023-03-08 ENCOUNTER — HOSPITAL ENCOUNTER (INPATIENT)
Facility: CLINIC | Age: 33
LOS: 2 days | Discharge: HOME | End: 2023-03-10
Attending: OBSTETRICS & GYNECOLOGY | Admitting: OBSTETRICS & GYNECOLOGY
Payer: COMMERCIAL

## 2023-03-08 DIAGNOSIS — Z98.891 S/P CESAREAN SECTION: ICD-10-CM

## 2023-03-08 DIAGNOSIS — Z98.891 S/P C-SECTION: Primary | ICD-10-CM

## 2023-03-08 DIAGNOSIS — D62 ANEMIA DUE TO BLOOD LOSS, ACUTE: ICD-10-CM

## 2023-03-08 LAB
ABO/RH(D): NORMAL
ANTIBODY SCREEN: NEGATIVE
HGB BLD-MCNC: 12.3 G/DL (ref 11.7–15.7)
SPECIMEN EXPIRATION DATE: NORMAL

## 2023-03-08 PROCEDURE — 86850 RBC ANTIBODY SCREEN: CPT | Performed by: OBSTETRICS & GYNECOLOGY

## 2023-03-08 PROCEDURE — 36415 COLL VENOUS BLD VENIPUNCTURE: CPT | Performed by: OBSTETRICS & GYNECOLOGY

## 2023-03-08 PROCEDURE — 250N000011 HC RX IP 250 OP 636: Performed by: OBSTETRICS & GYNECOLOGY

## 2023-03-08 PROCEDURE — 250N000009 HC RX 250: Performed by: OBSTETRICS & GYNECOLOGY

## 2023-03-08 PROCEDURE — 250N000011 HC RX IP 250 OP 636: Performed by: NURSE ANESTHETIST, CERTIFIED REGISTERED

## 2023-03-08 PROCEDURE — 59514 CESAREAN DELIVERY ONLY: CPT | Mod: 80 | Performed by: OBSTETRICS & GYNECOLOGY

## 2023-03-08 PROCEDURE — 258N000003 HC RX IP 258 OP 636: Performed by: OBSTETRICS & GYNECOLOGY

## 2023-03-08 PROCEDURE — 370N000017 HC ANESTHESIA TECHNICAL FEE, PER MIN: Performed by: OBSTETRICS & GYNECOLOGY

## 2023-03-08 PROCEDURE — 120N000001 HC R&B MED SURG/OB

## 2023-03-08 PROCEDURE — 360N000076 HC SURGERY LEVEL 3, PER MIN: Performed by: OBSTETRICS & GYNECOLOGY

## 2023-03-08 PROCEDURE — 59515 CESAREAN DELIVERY: CPT | Performed by: OBSTETRICS & GYNECOLOGY

## 2023-03-08 PROCEDURE — 86780 TREPONEMA PALLIDUM: CPT | Performed by: OBSTETRICS & GYNECOLOGY

## 2023-03-08 PROCEDURE — 250N000013 HC RX MED GY IP 250 OP 250 PS 637: Performed by: OBSTETRICS & GYNECOLOGY

## 2023-03-08 PROCEDURE — 258N000003 HC RX IP 258 OP 636: Performed by: NURSE ANESTHETIST, CERTIFIED REGISTERED

## 2023-03-08 PROCEDURE — 271N000001 HC OR GENERAL SUPPLY NON-STERILE: Performed by: OBSTETRICS & GYNECOLOGY

## 2023-03-08 PROCEDURE — 85018 HEMOGLOBIN: CPT | Performed by: OBSTETRICS & GYNECOLOGY

## 2023-03-08 PROCEDURE — 272N000001 HC OR GENERAL SUPPLY STERILE: Performed by: OBSTETRICS & GYNECOLOGY

## 2023-03-08 RX ORDER — CITRIC ACID/SODIUM CITRATE 334-500MG
30 SOLUTION, ORAL ORAL
Status: COMPLETED | OUTPATIENT
Start: 2023-03-08 | End: 2023-03-08

## 2023-03-08 RX ORDER — ACETAMINOPHEN 325 MG/1
975 TABLET ORAL EVERY 6 HOURS
Status: DISCONTINUED | OUTPATIENT
Start: 2023-03-08 | End: 2023-03-10 | Stop reason: HOSPADM

## 2023-03-08 RX ORDER — IBUPROFEN 800 MG/1
800 TABLET, FILM COATED ORAL EVERY 6 HOURS
Status: DISCONTINUED | OUTPATIENT
Start: 2023-03-09 | End: 2023-03-10 | Stop reason: HOSPADM

## 2023-03-08 RX ORDER — ONDANSETRON 2 MG/ML
4 INJECTION INTRAMUSCULAR; INTRAVENOUS EVERY 30 MIN PRN
Status: CANCELLED | OUTPATIENT
Start: 2023-03-08

## 2023-03-08 RX ORDER — MISOPROSTOL 200 UG/1
400 TABLET ORAL
Status: DISCONTINUED | OUTPATIENT
Start: 2023-03-08 | End: 2023-03-10 | Stop reason: HOSPADM

## 2023-03-08 RX ORDER — METOCLOPRAMIDE HYDROCHLORIDE 5 MG/ML
10 INJECTION INTRAMUSCULAR; INTRAVENOUS EVERY 6 HOURS PRN
Status: DISCONTINUED | OUTPATIENT
Start: 2023-03-08 | End: 2023-03-10 | Stop reason: HOSPADM

## 2023-03-08 RX ORDER — MISOPROSTOL 200 UG/1
800 TABLET ORAL
Status: DISCONTINUED | OUTPATIENT
Start: 2023-03-08 | End: 2023-03-08 | Stop reason: HOSPADM

## 2023-03-08 RX ORDER — SIMETHICONE 80 MG
80 TABLET,CHEWABLE ORAL 4 TIMES DAILY PRN
Status: DISCONTINUED | OUTPATIENT
Start: 2023-03-08 | End: 2023-03-10 | Stop reason: HOSPADM

## 2023-03-08 RX ORDER — SODIUM CHLORIDE, SODIUM LACTATE, POTASSIUM CHLORIDE, CALCIUM CHLORIDE 600; 310; 30; 20 MG/100ML; MG/100ML; MG/100ML; MG/100ML
INJECTION, SOLUTION INTRAVENOUS CONTINUOUS
Status: DISCONTINUED | OUTPATIENT
Start: 2023-03-08 | End: 2023-03-08 | Stop reason: HOSPADM

## 2023-03-08 RX ORDER — FENTANYL CITRATE 50 UG/ML
INJECTION, SOLUTION INTRAMUSCULAR; INTRAVENOUS
Status: COMPLETED | OUTPATIENT
Start: 2023-03-08 | End: 2023-03-08

## 2023-03-08 RX ORDER — MORPHINE SULFATE 1 MG/ML
INJECTION, SOLUTION EPIDURAL; INTRATHECAL; INTRAVENOUS
Status: COMPLETED | OUTPATIENT
Start: 2023-03-08 | End: 2023-03-08

## 2023-03-08 RX ORDER — OXYTOCIN/0.9 % SODIUM CHLORIDE 30/500 ML
340 PLASTIC BAG, INJECTION (ML) INTRAVENOUS CONTINUOUS PRN
Status: COMPLETED | OUTPATIENT
Start: 2023-03-08 | End: 2023-03-08

## 2023-03-08 RX ORDER — MISOPROSTOL 200 UG/1
400 TABLET ORAL
Status: DISCONTINUED | OUTPATIENT
Start: 2023-03-08 | End: 2023-03-08 | Stop reason: HOSPADM

## 2023-03-08 RX ORDER — BUPIVACAINE HYDROCHLORIDE 7.5 MG/ML
INJECTION, SOLUTION INTRASPINAL
Status: COMPLETED | OUTPATIENT
Start: 2023-03-08 | End: 2023-03-08

## 2023-03-08 RX ORDER — OXYTOCIN 10 [USP'U]/ML
10 INJECTION, SOLUTION INTRAMUSCULAR; INTRAVENOUS
Status: DISCONTINUED | OUTPATIENT
Start: 2023-03-08 | End: 2023-03-10 | Stop reason: HOSPADM

## 2023-03-08 RX ORDER — CARBOPROST TROMETHAMINE 250 UG/ML
250 INJECTION, SOLUTION INTRAMUSCULAR
Status: DISCONTINUED | OUTPATIENT
Start: 2023-03-08 | End: 2023-03-10 | Stop reason: HOSPADM

## 2023-03-08 RX ORDER — OXYCODONE HYDROCHLORIDE 5 MG/1
5 TABLET ORAL EVERY 4 HOURS PRN
Status: DISCONTINUED | OUTPATIENT
Start: 2023-03-08 | End: 2023-03-10 | Stop reason: HOSPADM

## 2023-03-08 RX ORDER — CEFAZOLIN SODIUM/WATER 2 G/20 ML
2 SYRINGE (ML) INTRAVENOUS SEE ADMIN INSTRUCTIONS
Status: DISCONTINUED | OUTPATIENT
Start: 2023-03-08 | End: 2023-03-08 | Stop reason: HOSPADM

## 2023-03-08 RX ORDER — HYDROMORPHONE HCL IN WATER/PF 6 MG/30 ML
0.4 PATIENT CONTROLLED ANALGESIA SYRINGE INTRAVENOUS EVERY 5 MIN PRN
Status: CANCELLED | OUTPATIENT
Start: 2023-03-08

## 2023-03-08 RX ORDER — DEXTROSE, SODIUM CHLORIDE, SODIUM LACTATE, POTASSIUM CHLORIDE, AND CALCIUM CHLORIDE 5; .6; .31; .03; .02 G/100ML; G/100ML; G/100ML; G/100ML; G/100ML
INJECTION, SOLUTION INTRAVENOUS CONTINUOUS
Status: DISCONTINUED | OUTPATIENT
Start: 2023-03-08 | End: 2023-03-10 | Stop reason: HOSPADM

## 2023-03-08 RX ORDER — NALOXONE HYDROCHLORIDE 0.4 MG/ML
0.2 INJECTION, SOLUTION INTRAMUSCULAR; INTRAVENOUS; SUBCUTANEOUS
Status: DISCONTINUED | OUTPATIENT
Start: 2023-03-08 | End: 2023-03-10 | Stop reason: HOSPADM

## 2023-03-08 RX ORDER — CARBOPROST TROMETHAMINE 250 UG/ML
250 INJECTION, SOLUTION INTRAMUSCULAR
Status: DISCONTINUED | OUTPATIENT
Start: 2023-03-08 | End: 2023-03-08 | Stop reason: HOSPADM

## 2023-03-08 RX ORDER — HYDROCORTISONE 25 MG/G
CREAM TOPICAL 3 TIMES DAILY PRN
Status: DISCONTINUED | OUTPATIENT
Start: 2023-03-08 | End: 2023-03-10 | Stop reason: HOSPADM

## 2023-03-08 RX ORDER — ONDANSETRON 2 MG/ML
INJECTION INTRAMUSCULAR; INTRAVENOUS PRN
Status: DISCONTINUED | OUTPATIENT
Start: 2023-03-08 | End: 2023-03-08

## 2023-03-08 RX ORDER — HYDROXYZINE HYDROCHLORIDE 25 MG/1
25 TABLET, FILM COATED ORAL EVERY 6 HOURS PRN
Status: CANCELLED | OUTPATIENT
Start: 2023-03-08

## 2023-03-08 RX ORDER — NALOXONE HYDROCHLORIDE 0.4 MG/ML
0.4 INJECTION, SOLUTION INTRAMUSCULAR; INTRAVENOUS; SUBCUTANEOUS
Status: DISCONTINUED | OUTPATIENT
Start: 2023-03-08 | End: 2023-03-10 | Stop reason: HOSPADM

## 2023-03-08 RX ORDER — OXYTOCIN 10 [USP'U]/ML
10 INJECTION, SOLUTION INTRAMUSCULAR; INTRAVENOUS
Status: DISCONTINUED | OUTPATIENT
Start: 2023-03-08 | End: 2023-03-08 | Stop reason: HOSPADM

## 2023-03-08 RX ORDER — ONDANSETRON 4 MG/1
4 TABLET, ORALLY DISINTEGRATING ORAL EVERY 30 MIN PRN
Status: CANCELLED | OUTPATIENT
Start: 2023-03-08

## 2023-03-08 RX ORDER — AMOXICILLIN 250 MG
1 CAPSULE ORAL 2 TIMES DAILY
Status: DISCONTINUED | OUTPATIENT
Start: 2023-03-08 | End: 2023-03-10 | Stop reason: HOSPADM

## 2023-03-08 RX ORDER — METHYLERGONOVINE MALEATE 0.2 MG/ML
200 INJECTION INTRAVENOUS
Status: DISCONTINUED | OUTPATIENT
Start: 2023-03-08 | End: 2023-03-08 | Stop reason: HOSPADM

## 2023-03-08 RX ORDER — SODIUM CHLORIDE, SODIUM LACTATE, POTASSIUM CHLORIDE, CALCIUM CHLORIDE 600; 310; 30; 20 MG/100ML; MG/100ML; MG/100ML; MG/100ML
INJECTION, SOLUTION INTRAVENOUS CONTINUOUS
Status: CANCELLED | OUTPATIENT
Start: 2023-03-08

## 2023-03-08 RX ORDER — ONDANSETRON 2 MG/ML
4 INJECTION INTRAMUSCULAR; INTRAVENOUS EVERY 6 HOURS PRN
Status: DISCONTINUED | OUTPATIENT
Start: 2023-03-08 | End: 2023-03-10 | Stop reason: HOSPADM

## 2023-03-08 RX ORDER — LIDOCAINE 40 MG/G
CREAM TOPICAL
Status: DISCONTINUED | OUTPATIENT
Start: 2023-03-08 | End: 2023-03-10 | Stop reason: HOSPADM

## 2023-03-08 RX ORDER — MODIFIED LANOLIN
OINTMENT (GRAM) TOPICAL
Status: DISCONTINUED | OUTPATIENT
Start: 2023-03-08 | End: 2023-03-10 | Stop reason: HOSPADM

## 2023-03-08 RX ORDER — METHYLERGONOVINE MALEATE 0.2 MG/ML
200 INJECTION INTRAVENOUS
Status: DISCONTINUED | OUTPATIENT
Start: 2023-03-08 | End: 2023-03-10 | Stop reason: HOSPADM

## 2023-03-08 RX ORDER — BISACODYL 10 MG
10 SUPPOSITORY, RECTAL RECTAL DAILY PRN
Status: DISCONTINUED | OUTPATIENT
Start: 2023-03-10 | End: 2023-03-10 | Stop reason: HOSPADM

## 2023-03-08 RX ORDER — CEFAZOLIN SODIUM/WATER 2 G/20 ML
2 SYRINGE (ML) INTRAVENOUS
Status: COMPLETED | OUTPATIENT
Start: 2023-03-08 | End: 2023-03-08

## 2023-03-08 RX ORDER — ALBUTEROL SULFATE 0.83 MG/ML
2.5 SOLUTION RESPIRATORY (INHALATION) EVERY 4 HOURS PRN
Status: CANCELLED | OUTPATIENT
Start: 2023-03-08

## 2023-03-08 RX ORDER — MISOPROSTOL 200 UG/1
800 TABLET ORAL
Status: DISCONTINUED | OUTPATIENT
Start: 2023-03-08 | End: 2023-03-10 | Stop reason: HOSPADM

## 2023-03-08 RX ORDER — LIDOCAINE 40 MG/G
CREAM TOPICAL
Status: DISCONTINUED | OUTPATIENT
Start: 2023-03-08 | End: 2023-03-08 | Stop reason: HOSPADM

## 2023-03-08 RX ORDER — PROCHLORPERAZINE MALEATE 10 MG
10 TABLET ORAL EVERY 6 HOURS PRN
Status: DISCONTINUED | OUTPATIENT
Start: 2023-03-08 | End: 2023-03-10 | Stop reason: HOSPADM

## 2023-03-08 RX ORDER — AMOXICILLIN 250 MG
2 CAPSULE ORAL 2 TIMES DAILY
Status: DISCONTINUED | OUTPATIENT
Start: 2023-03-08 | End: 2023-03-10 | Stop reason: HOSPADM

## 2023-03-08 RX ORDER — ONDANSETRON 4 MG/1
4 TABLET, ORALLY DISINTEGRATING ORAL EVERY 6 HOURS PRN
Status: DISCONTINUED | OUTPATIENT
Start: 2023-03-08 | End: 2023-03-10 | Stop reason: HOSPADM

## 2023-03-08 RX ORDER — OXYTOCIN/0.9 % SODIUM CHLORIDE 30/500 ML
340 PLASTIC BAG, INJECTION (ML) INTRAVENOUS CONTINUOUS PRN
Status: DISCONTINUED | OUTPATIENT
Start: 2023-03-08 | End: 2023-03-10 | Stop reason: HOSPADM

## 2023-03-08 RX ORDER — KETOROLAC TROMETHAMINE 30 MG/ML
30 INJECTION, SOLUTION INTRAMUSCULAR; INTRAVENOUS EVERY 6 HOURS
Status: COMPLETED | OUTPATIENT
Start: 2023-03-08 | End: 2023-03-09

## 2023-03-08 RX ORDER — METOCLOPRAMIDE 10 MG/1
10 TABLET ORAL EVERY 6 HOURS PRN
Status: DISCONTINUED | OUTPATIENT
Start: 2023-03-08 | End: 2023-03-10 | Stop reason: HOSPADM

## 2023-03-08 RX ORDER — ACETAMINOPHEN 325 MG/1
975 TABLET ORAL ONCE
Status: COMPLETED | OUTPATIENT
Start: 2023-03-08 | End: 2023-03-08

## 2023-03-08 RX ORDER — DEXAMETHASONE SODIUM PHOSPHATE 4 MG/ML
INJECTION, SOLUTION INTRA-ARTICULAR; INTRALESIONAL; INTRAMUSCULAR; INTRAVENOUS; SOFT TISSUE PRN
Status: DISCONTINUED | OUTPATIENT
Start: 2023-03-08 | End: 2023-03-08

## 2023-03-08 RX ORDER — TRANEXAMIC ACID 10 MG/ML
1 INJECTION, SOLUTION INTRAVENOUS EVERY 30 MIN PRN
Status: DISCONTINUED | OUTPATIENT
Start: 2023-03-08 | End: 2023-03-08 | Stop reason: HOSPADM

## 2023-03-08 RX ORDER — KETOROLAC TROMETHAMINE 30 MG/ML
INJECTION, SOLUTION INTRAMUSCULAR; INTRAVENOUS PRN
Status: DISCONTINUED | OUTPATIENT
Start: 2023-03-08 | End: 2023-03-08

## 2023-03-08 RX ORDER — TRANEXAMIC ACID 10 MG/ML
1 INJECTION, SOLUTION INTRAVENOUS EVERY 30 MIN PRN
Status: DISCONTINUED | OUTPATIENT
Start: 2023-03-08 | End: 2023-03-10 | Stop reason: HOSPADM

## 2023-03-08 RX ORDER — FENTANYL CITRATE 50 UG/ML
50 INJECTION, SOLUTION INTRAMUSCULAR; INTRAVENOUS EVERY 5 MIN PRN
Status: CANCELLED | OUTPATIENT
Start: 2023-03-08

## 2023-03-08 RX ORDER — OXYTOCIN/0.9 % SODIUM CHLORIDE 30/500 ML
100-340 PLASTIC BAG, INJECTION (ML) INTRAVENOUS CONTINUOUS PRN
Status: DISCONTINUED | OUTPATIENT
Start: 2023-03-08 | End: 2023-03-10 | Stop reason: HOSPADM

## 2023-03-08 RX ORDER — PROCHLORPERAZINE 25 MG
25 SUPPOSITORY, RECTAL RECTAL EVERY 12 HOURS PRN
Status: DISCONTINUED | OUTPATIENT
Start: 2023-03-08 | End: 2023-03-10 | Stop reason: HOSPADM

## 2023-03-08 RX ADMIN — ONDANSETRON 4 MG: 2 INJECTION INTRAMUSCULAR; INTRAVENOUS at 09:09

## 2023-03-08 RX ADMIN — Medication 100 ML/HR: at 13:33

## 2023-03-08 RX ADMIN — Medication 2 G: at 09:05

## 2023-03-08 RX ADMIN — ACETAMINOPHEN 975 MG: 325 TABLET, FILM COATED ORAL at 08:21

## 2023-03-08 RX ADMIN — PHENYLEPHRINE HYDROCHLORIDE 30 MCG/KG/MIN: 10 INJECTION INTRAVENOUS at 09:16

## 2023-03-08 RX ADMIN — SENNOSIDES AND DOCUSATE SODIUM 2 TABLET: 50; 8.6 TABLET ORAL at 22:27

## 2023-03-08 RX ADMIN — Medication 340 ML/HR: at 09:38

## 2023-03-08 RX ADMIN — MORPHINE SULFATE 0.2 MG: 1 INJECTION, SOLUTION EPIDURAL; INTRATHECAL; INTRAVENOUS at 09:12

## 2023-03-08 RX ADMIN — BUPIVACAINE HYDROCHLORIDE IN DEXTROSE 1.6 ML: 7.5 INJECTION, SOLUTION SUBARACHNOID at 09:12

## 2023-03-08 RX ADMIN — PHENYLEPHRINE HYDROCHLORIDE 100 MCG: 10 INJECTION INTRAVENOUS at 09:43

## 2023-03-08 RX ADMIN — KETOROLAC TROMETHAMINE 30 MG: 30 INJECTION, SOLUTION INTRAMUSCULAR; INTRAVENOUS at 16:18

## 2023-03-08 RX ADMIN — SODIUM CHLORIDE, POTASSIUM CHLORIDE, SODIUM LACTATE AND CALCIUM CHLORIDE: 600; 310; 30; 20 INJECTION, SOLUTION INTRAVENOUS at 08:21

## 2023-03-08 RX ADMIN — FENTANYL CITRATE 15 MCG: 50 INJECTION, SOLUTION INTRAMUSCULAR; INTRAVENOUS at 09:12

## 2023-03-08 RX ADMIN — ACETAMINOPHEN 975 MG: 325 TABLET, FILM COATED ORAL at 22:27

## 2023-03-08 RX ADMIN — KETOROLAC TROMETHAMINE 30 MG: 30 INJECTION, SOLUTION INTRAMUSCULAR at 10:06

## 2023-03-08 RX ADMIN — DEXAMETHASONE SODIUM PHOSPHATE 4 MG: 4 INJECTION, SOLUTION INTRA-ARTICULAR; INTRALESIONAL; INTRAMUSCULAR; INTRAVENOUS; SOFT TISSUE at 09:24

## 2023-03-08 RX ADMIN — KETOROLAC TROMETHAMINE 30 MG: 30 INJECTION, SOLUTION INTRAMUSCULAR; INTRAVENOUS at 22:27

## 2023-03-08 RX ADMIN — ACETAMINOPHEN 975 MG: 325 TABLET, FILM COATED ORAL at 16:17

## 2023-03-08 RX ADMIN — SODIUM CITRATE AND CITRIC ACID MONOHYDRATE 30 ML: 500; 334 SOLUTION ORAL at 09:03

## 2023-03-08 ASSESSMENT — ACTIVITIES OF DAILY LIVING (ADL)
ADLS_ACUITY_SCORE: 18
HEARING_DIFFICULTY_OR_DEAF: NO
DIFFICULTY_EATING/SWALLOWING: NO
ADLS_ACUITY_SCORE: 18
CHANGE_IN_FUNCTIONAL_STATUS_SINCE_ONSET_OF_CURRENT_ILLNESS/INJURY: NO
ADLS_ACUITY_SCORE: 18
WALKING_OR_CLIMBING_STAIRS_DIFFICULTY: NO
ADLS_ACUITY_SCORE: 18
ADLS_ACUITY_SCORE: 18
TOILETING_ISSUES: NO
DIFFICULTY_COMMUNICATING: NO
DOING_ERRANDS_INDEPENDENTLY_DIFFICULTY: NO
CONCENTRATING,_REMEMBERING_OR_MAKING_DECISIONS_DIFFICULTY: NO
WEAR_GLASSES_OR_BLIND: NO
NUMBER_OF_TIMES_PATIENT_HAS_FALLEN_WITHIN_LAST_SIX_MONTHS: 1
DRESSING/BATHING_DIFFICULTY: NO
FALL_HISTORY_WITHIN_LAST_SIX_MONTHS: YES
ADLS_ACUITY_SCORE: 18

## 2023-03-08 NOTE — PROGRESS NOTES
VO to leave weathers cath in prn nursing discretion.  Pt unable to move legs from spinal anesth. Will remove when appropiate.

## 2023-03-08 NOTE — INTERVAL H&P NOTE
The History and Physical has been reviewed, the patient has been examined and no changes have occurred in the patient's condition since the H & P was completed.        Clinical Conditions Present on Arrival:  Clinically Significant Risk Factors Present on Admission

## 2023-03-08 NOTE — PROGRESS NOTES
S:Delivery  B:No Labor,39w2d    Lab Results   Component Value Date    GBS Negative 2018    with antibiotic treatment not indicated 4 hours prior to delivery.  A: Patient delivered Repeat C/S at 0937 with Dr. JEFFRY Rees in attendance and baby placed on mother's abdomen for delayed cord clamping. Baby dried and stimulated. Mother refused  skin to skin Apgars 9/9.Delivery .  IV infusion of Oxytocin  infused. Placenta removal manual. MD does not want placenta sent to pathology.  See Flowsheet for VS and PP checks.  .  Labor care plan goals met, transition now to postpartum care.   R: Expect routine postpartum care. Anticipate first feeding within the hour or whenever infant displays feeding cues. Continue skin to skin. Prior discussion with mother indicates that feeding plan is Breast feeding . Educated mother on importance of exclusive breastfeeding, expected feeding readiness cues and encouraged her to observe for these cues while rooming in. Informed her that breastfeeding assistance would be provided.

## 2023-03-08 NOTE — OP NOTE
[facility]  Full Operative Progress Note     Surgery Date:  3/8/2023  Surgeon:  Desiree Rees MD   Assistants:  Monik Turcios DO    This assistance of this surgeon was required due to the need for additional skilled surgical hands to retract and hold instruments due to the nature of the surgical procedure and risk of complications.   Specific reasons: Obesity, BMI 37, hx of CS    Pre-op Diagnosis:  1. Intrauterine pregnancy at 39w2d     2. Hx of CS x1    Post-op Diagnosis:  1. Same      2. Liveborn male infant   Procedure:  Repeat low-transverse  section with double layer uterine closure via Pfannenstiel incision    Anesthesia: Spinal  EBL:  767 mL  IVF:  See anesthesia  UOP:  See anesthesia, clear urine at the end of the case  Drains: Bell Catheter   Specimens:  Cord blood/segment    Complications: None     Indications:   Anu Davis is a 32 year old  at 39w2d admitted for repeat.  The risks, benefits, and alternatives of  section were discussed with the patient, and she agreed to proceed.     Findings:   1. Mild recto-fascial adhesions, no intraabdominal adhesions  2. Clear amniotic fluid  3. Liveborn male infant in OT presentation. Apgars 9 at 1 minute & 9 at 5 minutes. Weight 7lb 13 oz  4. Normal uterus, fallopian tubes, and ovaries.     Procedure Details:   The patient was brought to the OR, where adequate spinal anesthesia was administered.  She was placed in the dorsal supine position with a slight leftward tilt. She was prepped and draped in the usual sterile fashion. A surgical time out was performed. A pfannenstiel skin incision was made with the scalpel, and carried down to the underlying fascia with sharp and blunt dissection. The fascia was incised in the midline, and the incision was extended laterally with the Krishna scissors. The superior aspect of the fascia was grasped with the Kocher clamps and dissected off of the underlying rectus  muscles with blunt and sharp dissection. Attention was then turned to the inferior aspect of the fascia, which was similarly dissected off of the underlying rectus muscles. The rectus muscles were  in the midline, and the peritoneum was entered sharply with metzenbaum scissors and Eladia clamps, and the opening was extended with digital pressure. Med O placed.     A transverse hysterotomy was made with the scalpel in the lower uterine segment, and the incision was extended with digital pressure.  The infant was noted to be in the OT position, and was delivered atraumatically. The shoulders delivered easily. Body cord noted. The cord was doubly clamped and after approximately one minute. Infant was handed off to the awaiting RN. The placenta was delivered with gentle traction on the umbilical cord and uterine massage. The uterus was exteriorized and cleared of all clots and debris. Uterine tone was noted to be mildly boggy but with improvement with pitocin given through the running IV and uterine massage.  The hysterotomy was closed with a running locked suture of 0 Vicryl.  The hysterotomy was then imbricated using an 0 Monocryl suture. The hysterotomy was noted to be hemostatic after one additional figure of X suture at the left aspect of the incision. The posterior cul-de-sac was cleared of all clots and debris. The uterus was returned to the abdomen. The pericolic gutters were cleared of all clots and debris. The hysterotomy was reexamined and noted to be hemostatic.    The fascia and rectus muscles were examined and areas of oozing were controlled with electrocautery. The fascia was closed with a running 0 Vicryl suture. The subcutaneous tissue was irrigated and areas of oozing were controlled with electrocautery. The subcutaneous tissue was greater than 2 cm in thickness, and was therefore closed with 3-0 plain gut. The skin was closed with 4-0 monocryl and skin glue    All sponge, needle, and  instrument counts were correct. The patient tolerated the procedure well, and was transferred to recovery in stable condition.    Dr. Turcios actively first assisted during this surgery to provide nessessary retraction and exposure as well as maintaining hemostasis and a clear operative field. This was helpful in allowing the operative to proceed in a safe and expeditious manner. She was present for the entire duration of the surgery.    Desiree Rees MD   3/8/2023 10:19 AM

## 2023-03-08 NOTE — ANESTHESIA PREPROCEDURE EVALUATION
Anesthesia Pre-Procedure Evaluation    Patient: Anu Davis   MRN: [id number] : 1990        Procedure : Procedure(s):   SECTION          Past Medical History:   Diagnosis Date     Chickenpox      Ganglion of joint 2011     Tenosynovitis       Past Surgical History:   Procedure Laterality Date      SECTION N/A 2018    Procedure:  SECTION;;  Surgeon: Wang Pelletier MD;  Location: [facility] OR     MOUTH SURGERY      wisdom teeth     WRIST SURGERY Right 2011    ganglion removal      Allergies   Allergen Reactions     Bees Itching and Swelling      Social History     Tobacco Use     Smoking status: Never     Smokeless tobacco: Never   Substance Use Topics     Alcohol use: Not Currently     Comment: rare-quit with pregnancy      Wt Readings from Last 1 Encounters:   23 81.6 kg (180 lb)        Anesthesia Evaluation            ROS/MED HX  ENT/Pulmonary:  - neg pulmonary ROS     Neurologic:  - neg neurologic ROS     Cardiovascular:  - neg cardiovascular ROS     METS/Exercise Tolerance: >4 METS    Hematologic:  - neg hematologic  ROS     Musculoskeletal:  - neg musculoskeletal ROS     GI/Hepatic:  - neg GI/hepatic ROS     Renal/Genitourinary:  - neg Renal ROS     Endo:  - neg endo ROS     Psychiatric/Substance Use:  - neg psychiatric ROS     Infectious Disease:  - neg infectious disease ROS     Malignancy:  - neg malignancy ROS     Other:  - neg other ROS          Physical Exam    Airway  airway exam normal      Mallampati: II   TM distance: > 3 FB   Neck ROM: full   Mouth opening: > 3 cm    Respiratory Devices and Support         Dental       (+) Completely normal teeth      Cardiovascular   cardiovascular exam normal          Pulmonary   pulmonary exam normal                OUTSIDE LABS:  CBC:   Lab Results   Component Value Date    WBC 10.0 2022    WBC 11.0 2022    HGB 12.3 2023    HGB 11.1 (L) 2022    HCT 34.2 (L) 2022    HCT 38.9  07/20/2022     11/25/2022     07/20/2022     BMP: No results found for: NA, POTASSIUM, CHLORIDE, CO2, BUN, CR, GLC  COAGS: No results found for: PTT, INR, FIBR  POC:   Lab Results   Component Value Date    HCG Negative 03/08/2019     HEPATIC: No results found for: ALBUMIN, PROTTOTAL, ALT, AST, GGT, ALKPHOS, BILITOTAL, BILIDIRECT, JOAN  OTHER:   Lab Results   Component Value Date    TSH 1.73 08/12/2020       Anesthesia Plan    ASA Status:  1   NPO Status:  NPO Appropriate    Anesthesia Type: Spinal.              Consents    Anesthesia Plan(s) and associated risks, benefits, and realistic alternatives discussed. Questions answered and patient/representative(s) expressed understanding.    - Discussed:     - Discussed with:  Patient         Postoperative Care    Pain management: Peripheral nerve block (Single Shot), Multi-modal analgesia.   PONV prophylaxis: Ondansetron (or other 5HT-3), Dexamethasone or Solumedrol     Comments:                LIZBETH Meza CRNA

## 2023-03-08 NOTE — ANESTHESIA PROCEDURE NOTES
"Intrathecal injection Procedure Note    Pre-Procedure   Staff -        CRNA: Jinny Méndez APRN CRNA       Other Anesthesia Staff: Darci Rivers       Performed By: CRNA and JOY       Location: OB       Procedure Start/Stop Times: 3/8/2023 9:10 AM and 3/8/2023 9:15 AM       Pre-Anesthestic Checklist: patient identified, IV checked, risks and benefits discussed, informed consent, monitors and equipment checked, pre-op evaluation, at physician/surgeon's request and post-op pain management  Timeout:       Correct Patient: Yes        Correct Procedure: Yes        Correct Site: Yes        Correct Position: Yes   Procedure Documentation  Procedure: intrathecal injection       Diagnosis: C-Sec       Patient Position: sitting       Skin prep: Chloraprep       Insertion Site: L3-4. (midline approach).       Needle Gauge: 25.        Needle Length (Inches): 4        Spinal Needle Type: Pencan       Introducer used       # of attempts: 1 and  # of redirects:  1    Assessment/Narrative         Paresthesias: No.       CSF fluid: clear.       Opening pressure was cmH2O while  Sitting.      Medication(s) Administered   0.75% Hyperbaric Bupivacaine (Intrathecal) - Intrathecal   1.6 mL - 3/8/2023 9:12:00 AM  Morphine PF 1 mg/mL (Intrathecal) - Intrathecal   0.2 mg - 3/8/2023 9:12:00 AM  Fentanyl PF (Intrathecal) - Intrathecal   15 mcg - 3/8/2023 9:12:00 AM  Medication Administration Time: 3/8/2023 9:10 AM      FOR [facility] ONLY:   Pain Team Contact information: please page the Pain Team Via [employer]. Search \"Pain\". During daytime hours, please page the attending first. At night please page the resident first.    "

## 2023-03-08 NOTE — PROGRESS NOTES
Pt up oob and ambulated in room with sba.  Akbar well.  Ray hill'd @ 9390. Akbar reg lunch tray.  Pt stable.

## 2023-03-08 NOTE — H&P
[facility] Labor and Delivery H&P  2023  Anu Davis  [id number]      HPI: Anu Davis is a 32 year old  at 39w2d here for Repeat CS. She states that she is feeling well today.  + FM, no ctx, VB, or LOF.  She denies fever, HA, scotoma, nausea, vomiting, CP, SOB, RUQ pain, constipation, diarrhea, and acute swelling.        Pregnancy notable for:  --Hx of CSx1    OBHX:   OB History    Para Term  AB Living   3 1 1 0 1 1   SAB IAB Ectopic Multiple Live Births   1 0 0 0 1      # Outcome Date GA Lbr Talib/2nd Weight Sex Delivery Anes PTL Lv   3 Current            2 SAB 10/01/21 8w0d    AB, MISSED      1 Term 18 40w3d 12:45 / 04:16 3.07 kg (6 lb 12.3 oz) F CS-LTranv EPI N GENE      Birth Comments: NP called to the delivery for arrest of decent and fetal tachycardia.  After some pushing it was decided by the OBGYN to go to  secondary to arrest of decent.  NP scrubbed in and assisted in the .  Infant delivered with a nuchal cord x1.  Infant cried right away after delivery.  Infant brought to the warmer by the RN for assessment and measurements. Apgars were 9/9.  No further intervention required.  Infant to mother for skin to skin and bonding.        Name: Christine      Apgar1: 9  Apgar5: 9       MedicalHX:   Past Medical History:   Diagnosis Date     Chickenpox      Ganglion of joint 2011     Tenosynovitis        SurgicalHX:   Past Surgical History:   Procedure Laterality Date      SECTION N/A 2018    Procedure:  SECTION;;  Surgeon: Wang Pelletier MD;  Location: [place] OR     MOUTH SURGERY      wisdom teeth     WRIST SURGERY Right     ganglion removal       Medications:   No current facility-administered medications on file prior to encounter.  clobetasol propionate (CLOBEX) 0.05 % external shampoo, Apply topically to scalp three times per week  fluocinonide (LIDEX) 0.05 % external solution, Apply to scalp BID x 1-2 weeks  PRN  ketoconazole (NIZORAL) 2 % external shampoo, Use daily as needed  Prenatal Vit-Fe Fumarate-FA (PRENATAL MULTIVITAMIN W/IRON) 27-0.8 MG tablet, Take 1 tablet by mouth daily   triamcinolone (KENALOG) 0.1 % external cream, Apply to ears twice per day as needed        Allergies:  Allergies   Allergen Reactions     Bees Itching and Swelling       FamilyHX:  Family History   Problem Relation Age of Onset     Depression Mother         situational     Medical History Unknown Mother         mother adopted     Hypertension Father      Diverticulitis Father      Colon Polyps Father      Emphysema Paternal Grandmother      Other - See Comments Paternal Grandmother         MVA       SocialHX:   Social History     Socioeconomic History     Marital status:    Tobacco Use     Smoking status: Never     Smokeless tobacco: Never   Vaping Use     Vaping Use: Never used   Substance and Sexual Activity     Alcohol use: Not Currently     Comment: rare-quit with pregnancy     Drug use: No     Sexual activity: Yes     Partners: Male     Comment:  since 2016   Social History Narrative     for grades 2-5 in [place]    Daughter born approx 4/2018       ROS: 10-point ROS negative except as in HPI     Physical Exam:  Vitals:    03/08/23 0752   BP: 121/78   Pulse: 83   Resp: 18   Temp: 98.6  F (37  C)   TempSrc: Oral     GEN: resting comfortably in bed, NAD   CV: RRR, no murmurs  PULM: CTAB, no increased work of breathing, no cough/wheeze   ABD: soft, gravid, non-tender, non-distended  EXT: trac eedema, non tender to palpation    NST:  FHT: baseline 140, moderate variability, 1 accel, no decels  TOCO: 1-2/10        Labs:   CBC RPR T&S   Lab Results   Component Value Date    ABO O 04/01/2018    RH Pos 04/01/2018    AS Negative 07/20/2022    HEPBANG Nonreactive 07/20/2022    CHPCRT Negative 01/15/2018    TREPAB Negative 04/01/2018    HGB 12.3 03/08/2023       GBS Status:   Lab Results   Component Value  Date    GBS Negative 2018       Lab Results   Component Value Date    PAP NIL 2019       A/P: Anu Davis is a 32 year old female  at 39w2d here for repeat CS. Reviewed risks, benefits and alternatives to surgery and pt agreed to proceed. Informed consent obtained    FWB: FHT overall reassuring  Pain: spinal planned    Proceed to OR    Desiree Rees MD   OB/GYN   3/8/2023 9:02 AM

## 2023-03-08 NOTE — ANESTHESIA CARE TRANSFER NOTE
Patient: Anu Davis    Procedure: Procedure(s):   SECTION       Diagnosis: History of  delivery [Z98.891]  Diagnosis Additional Information: No value filed.    Anesthesia Type:   Spinal     Note:    Oropharynx: spontaneously breathing  Level of Consciousness: awake  Oxygen Supplementation: room air    Independent Airway: airway patency satisfactory and stable  Dentition: dentition unchanged  Vital Signs Stable: post-procedure vital signs reviewed and stable  Report to RN Given: handoff report given  Patient transferred to: Phase II          Vitals:  Vitals Value Taken Time   /52 23 1115   Temp 36.9  C (98.5  F) 23 1045   Pulse 71 23 1115   Resp 16 23 1115   SpO2 96 % 23 1124   Vitals shown include unvalidated device data.    Electronically Signed By: LIZBETH Meza CRNA  2023  11:26 AM

## 2023-03-08 NOTE — ANESTHESIA POSTPROCEDURE EVALUATION
Patient: Anu Davis    Procedure: Procedure(s):   SECTION       Anesthesia Type:  Spinal    Note:  Disposition: Outpatient   Postop Pain Control: Uneventful            Sign Out: Well controlled pain   PONV: No   Neuro/Psych: Uneventful            Sign Out: Acceptable/Baseline neuro status   Airway/Respiratory: Uneventful            Sign Out: Acceptable/Baseline resp. status   CV/Hemodynamics: Uneventful            Sign Out: Acceptable CV status; No obvious hypovolemia; No obvious fluid overload   Other NRE: NONE   DID A NON-ROUTINE EVENT OCCUR? No           Last vitals:  Vitals Value Taken Time   /52 23 1115   Temp 36.9  C (98.5  F) 23 1045   Pulse 71 23 1115   Resp 16 23 1115   SpO2 97 % 23 1125   Vitals shown include unvalidated device data.    Electronically Signed By: LIZBETH Meza CRNA  2023  11:26 AM

## 2023-03-09 LAB
HGB BLD-MCNC: 9.4 G/DL (ref 11.7–15.7)
HOLD SPECIMEN: NORMAL
T PALLIDUM AB SER QL: NONREACTIVE

## 2023-03-09 PROCEDURE — 36415 COLL VENOUS BLD VENIPUNCTURE: CPT | Performed by: OBSTETRICS & GYNECOLOGY

## 2023-03-09 PROCEDURE — 120N000001 HC R&B MED SURG/OB

## 2023-03-09 PROCEDURE — 250N000011 HC RX IP 250 OP 636: Performed by: OBSTETRICS & GYNECOLOGY

## 2023-03-09 PROCEDURE — 250N000013 HC RX MED GY IP 250 OP 250 PS 637: Performed by: OBSTETRICS & GYNECOLOGY

## 2023-03-09 PROCEDURE — 85018 HEMOGLOBIN: CPT | Performed by: OBSTETRICS & GYNECOLOGY

## 2023-03-09 RX ORDER — MAGNESIUM 200 MG
1000 TABLET ORAL DAILY
Qty: 90 TABLET | Refills: 1 | Status: SHIPPED | OUTPATIENT
Start: 2023-03-09 | End: 2023-04-20

## 2023-03-09 RX ORDER — ASCORBIC ACID 250 MG
250 TABLET,CHEWABLE ORAL DAILY
Qty: 90 TABLET | Refills: 1 | Status: SHIPPED | OUTPATIENT
Start: 2023-03-09 | End: 2023-04-20

## 2023-03-09 RX ORDER — FERROUS SULFATE 325(65) MG
325 TABLET ORAL
Qty: 90 TABLET | Refills: 1 | Status: SHIPPED | OUTPATIENT
Start: 2023-03-09

## 2023-03-09 RX ADMIN — ACETAMINOPHEN 975 MG: 325 TABLET, FILM COATED ORAL at 18:22

## 2023-03-09 RX ADMIN — IBUPROFEN 800 MG: 800 TABLET ORAL at 18:22

## 2023-03-09 RX ADMIN — KETOROLAC TROMETHAMINE 30 MG: 30 INJECTION, SOLUTION INTRAMUSCULAR; INTRAVENOUS at 05:35

## 2023-03-09 RX ADMIN — ACETAMINOPHEN 975 MG: 325 TABLET, FILM COATED ORAL at 05:35

## 2023-03-09 RX ADMIN — IBUPROFEN 800 MG: 800 TABLET ORAL at 11:53

## 2023-03-09 RX ADMIN — ACETAMINOPHEN 975 MG: 325 TABLET, FILM COATED ORAL at 11:53

## 2023-03-09 RX ADMIN — SENNOSIDES AND DOCUSATE SODIUM 1 TABLET: 50; 8.6 TABLET ORAL at 08:10

## 2023-03-09 ASSESSMENT — ACTIVITIES OF DAILY LIVING (ADL)
ADLS_ACUITY_SCORE: 18

## 2023-03-09 NOTE — PROGRESS NOTES
[facility] OB/GYN Department    Post-Partum Progress Note: PPD #1    Name: Anu Davis  Date: 3/9/2023    Subjective:   Patient seen and examined.  No complaints.  Pain well controlled.  Tolerating regular diet, no nausea or vomiting.  Ambulating and voiding without difficulty.  Lochia mild.  Breast feeding. No dizziness or lightheadedness    ROS:    General/Constitutional:  Denies chills or fever  Respiratory: Denies shortness of breath  Cardiovascular: Denies chest pain  Gastrointestinal:  +mild uterine cramping, no nausea or vomiting  Genitourinary: Denies difficulty urinating  Musculoskeletal: Denies no peripheral edema      Objective:     Intake/Output Summary (Last 24 hours) at 3/9/2023 0925  Last data filed at 3/9/2023 0159  Gross per 24 hour   Intake 800 ml   Output 2137 ml   Net -1337 ml       Patient Vitals for the past 24 hrs:   BP Temp Temp src Pulse Resp SpO2   03/09/23 0807 102/54 98  F (36.7  C) Oral 82 14 97 %   03/09/23 0056 104/44 98.1  F (36.7  C) Oral 76 16 95 %   03/08/23 2000 114/63 98.6  F (37  C) Oral 79 18 97 %   03/08/23 1715 108/56 -- -- 80 -- --   03/08/23 1629 117/57 -- -- 69 16 95 %   03/08/23 1515 125/71 98.4  F (36.9  C) Oral 70 16 99 %   03/08/23 1415 103/55 -- -- 74 16 99 %   03/08/23 1315 108/47 -- -- -- 18 99 %   03/08/23 1245 108/54 -- -- 67 15 100 %   03/08/23 1215 109/53 -- -- 69 16 --   03/08/23 1200 100/56 -- -- 67 16 98 %   03/08/23 1145 93/53 -- -- 79 16 97 %   03/08/23 1130 99/52 -- -- 77 16 96 %   03/08/23 1115 103/52 -- -- 71 16 --   03/08/23 1100 101/52 -- -- 71 16 96 %   03/08/23 1045 111/58 98.5  F (36.9  C) Oral 59 16 96 %   03/08/23 1025 105/48 -- -- 68 16 98 %       Recent Labs   Lab 03/09/23  0531 03/08/23  0811   HGB 9.4* 12.3       General appearance: well-hydrated, A&O x 3, no apparent distress  ENT: EOMI, sclera anicteric   Lungs: Equal expansion bilaterally, no accessory muscle use  Heart: No heaves or thrills. No peripheral  varicosities  Constitutional: See vitals  Abdomen: Soft, non-distended, no rebound or rigidity, incision CDI  Uterus: Firm at umbilicus with non-tender fundus   Neurologic: CN II-XII grossly intact, no lateralizing defects, no gross movement abnormalities  Extremities: trace edema, no calf tenderness    Assessment and Plan:    32 year old   s/p  delivery  Routine postpartum cares  Anemia.  Asymptomatic.  Will order iron, B12 and Vitamin C at discharge.    Nicolle Ferris MD

## 2023-03-09 NOTE — PLAN OF CARE
Data: Vital signs within normal limits. Postpartum checks within normal limits - see flow record. Patient eating and drinking normally. Patient able to empty bladder independently and is up ambulating. No apparent signs of infection. Incision healing well. Patient performing self cares and is able to care for infant.  Action: Patient medicated during the shift for pain. See MAR. Patient reassessed within 1 hour after each medication and pain was improved - patient stated she was comfortable. Patient education done about breastfeeding. See flow record.  Response: Positive attachment behaviors observed with infant. Support persons present.   Plan: Anticipate discharge on 3/11/23.

## 2023-03-09 NOTE — LACTATION NOTE
This note was copied from a baby's chart.  Assisted mother with latching infant, needs stimulation to keep nursing, acting sleepy. Nursed well on one side. Taught mother how to hand express, 1 ml given to infant. Reviewed how to increase milk supply.Handout on how to soothe ababy on second night given.

## 2023-03-09 NOTE — PROGRESS NOTES
Data: Vital signs within normal limits. Postpartum checks within normal limits - see flow record. Patient eating and drinking normally. Patient able to empty bladder independently and is up ambulating. No apparent signs of infection. Incision healing well. Patient performing self cares and is able to care for infant.  Action: Patient medicated during the shift for pain. See MAR. Patient reassessed within 1 hour after each medication and pain was improved - patient stated she was comfortable. Patient education done about finger feeding, infant cares, breast feeding, and bilirubin. See flow record.  Response: Positive attachment behaviors observed with infant. Support persons Father of baby present.   Plan: Anticipate discharge on 3/10/23.

## 2023-03-10 VITALS
DIASTOLIC BLOOD PRESSURE: 64 MMHG | SYSTOLIC BLOOD PRESSURE: 122 MMHG | OXYGEN SATURATION: 97 % | HEART RATE: 107 BPM | RESPIRATION RATE: 16 BRPM | TEMPERATURE: 97.9 F

## 2023-03-10 PROBLEM — D62 ANEMIA DUE TO BLOOD LOSS, ACUTE: Status: ACTIVE | Noted: 2023-03-10

## 2023-03-10 PROCEDURE — 250N000013 HC RX MED GY IP 250 OP 250 PS 637: Performed by: OBSTETRICS & GYNECOLOGY

## 2023-03-10 RX ORDER — OXYCODONE HYDROCHLORIDE 5 MG/1
5 TABLET ORAL EVERY 6 HOURS PRN
Qty: 12 TABLET | Refills: 0 | Status: SHIPPED | OUTPATIENT
Start: 2023-03-10 | End: 2023-04-20

## 2023-03-10 RX ORDER — IBUPROFEN 800 MG/1
800 TABLET, FILM COATED ORAL EVERY 6 HOURS PRN
Qty: 30 TABLET | Refills: 0 | Status: SHIPPED | OUTPATIENT
Start: 2023-03-10 | End: 2023-04-20

## 2023-03-10 RX ORDER — AMOXICILLIN 250 MG
1 CAPSULE ORAL 2 TIMES DAILY PRN
Qty: 30 TABLET | Refills: 0 | Status: SHIPPED | OUTPATIENT
Start: 2023-03-10 | End: 2023-04-20

## 2023-03-10 RX ADMIN — ACETAMINOPHEN 975 MG: 325 TABLET, FILM COATED ORAL at 05:16

## 2023-03-10 RX ADMIN — IBUPROFEN 800 MG: 800 TABLET ORAL at 05:16

## 2023-03-10 RX ADMIN — ACETAMINOPHEN 975 MG: 325 TABLET, FILM COATED ORAL at 12:35

## 2023-03-10 RX ADMIN — ACETAMINOPHEN 975 MG: 325 TABLET, FILM COATED ORAL at 00:02

## 2023-03-10 RX ADMIN — IBUPROFEN 800 MG: 800 TABLET ORAL at 00:01

## 2023-03-10 RX ADMIN — IBUPROFEN 800 MG: 800 TABLET ORAL at 12:35

## 2023-03-10 RX ADMIN — SENNOSIDES AND DOCUSATE SODIUM 2 TABLET: 50; 8.6 TABLET ORAL at 00:01

## 2023-03-10 RX ADMIN — SENNOSIDES AND DOCUSATE SODIUM 1 TABLET: 50; 8.6 TABLET ORAL at 09:35

## 2023-03-10 ASSESSMENT — ACTIVITIES OF DAILY LIVING (ADL)
ADLS_ACUITY_SCORE: 18

## 2023-03-10 NOTE — PLAN OF CARE
Patient discharged per wheelchair with infant in car seat. Mother verified that her band matches her infant's band by comparing the infant's MR# [id number].  Discharge instructions given. Encouraged to call for any problems, questions or concerns. RXs picked up at in-house pharmacy prior to leaving.

## 2023-03-10 NOTE — PROGRESS NOTES
Data: Vital signs within normal limits. Postpartum checks within normal limits - see flow record. Patient eating and drinking normally. Patient able to empty bladder independently and is up ambulating. No apparent signs of infection. Incision healing well. Patient performing self cares and is able to care for infant.  Action: Patient medicated during the shift for pain. See MAR. Patient reassessed within 1 hour after each medication and pain was improved - patient stated she was comfortable. Patient education done about infant hearing screen, breastfeeding. See flow record.  Response: Positive attachment behaviors observed with infant. Support persons, , present.   Plan: Anticipate discharge on 3/10/23

## 2023-03-10 NOTE — ASSESSMENT & PLAN NOTE
POD#2 s/p RLTCS   Routine post operative care  Encourage ambulation  Optimize pain management  Lactation support  Discussed post operative restrictions and return precautions  Anticipate discharge home today or tomorrow

## 2023-03-10 NOTE — PROGRESS NOTES
[facility] OB/GYN Department    Post-Partum Progress Note: POD #2    Name: Anu Davis  Date: 3/10/2023    Subjective:   Patient seen and examined.  No complaints today.  Pain well controlled.  Tolerating regular diet, without nausea or vomiting.  Ambulating without difficulty.  Bell removed post operatively, voiding spontaneously. + flatus, + BM. Breast feeding.     ROS:    General/Constitutional:  Denies chills or fever  Respiratory: Denies shortness of breath  Cardiovascular: Denies chest pain  Gastrointestinal:  +mild uterine cramping, no nausea or vomiting, + flatus, + BM  Genitourinary: Denies difficulty urinating  Musculoskeletal: + peripheral edema      Objective:   No intake or output data in the 24 hours ending 03/10/23 0922    Patient Vitals for the past 24 hrs:   BP Temp Temp src Pulse Resp SpO2   03/10/23 0003 112/59 97.4  F (36.3  C) Oral 81 16 97 %   23 1548 99/58 97.7  F (36.5  C) Oral 85 16 --   23 1500 -- 97  F (36.1  C) -- -- -- --       Recent Labs   Lab 23  0531 23  0811   HGB 9.4* 12.3         General appearance: well-hydrated, A&O x 3, no apparent distress  ENT: EOMI, sclera anicteric   Lungs: Equal expansion bilaterally, no accessory muscle use  Heart: No heaves or thrills. No peripheral varicosities  Constitutional: See vitals  Abdomen: Soft, non-distended, no rebound or rigidity. Incision c/d/i, uterus firm below umbilicus with non-tender fundus   Neurologic: CN II-XII grossly intact, no lateralizing defects, no gross movement abnormalities  Extremities: 1+ pitting pedal edema, no calf tenderness      Assessment and Plan:    Anemia due to blood loss, acute  Started on iron supplementation with Vitamin B12 and C  Continue prenatal with iron    S/P  section  POD#2 s/p RLTCS   Routine post operative care  Encourage ambulation  Optimize pain management  Lactation support  Discussed post operative restrictions and return precautions  Anticipate  discharge home today or tomorrow      Monik Turcios, DO

## 2023-03-10 NOTE — DISCHARGE SUMMARY
[facility] Discharge Summary    Anu Davis MRN# [id number]   Age: 32 year old YOB: 1990     Date of Admission:  3/8/2023  Date of Discharge::  3/10/2023  Admitting Physician:  Desiree Rees MD  Discharge Physician:  Monik Turcios DO     Home clinic: [facility]          Admission Diagnoses:   History of  delivery [Z98.891]  S/P  section [Z98.891]          Discharge Diagnosis:   S/p repeat low transverse C section  Acute blood loss anemia          Procedures:   Procedure(s): Repeat low transverse  section       No other procedures performed during this admission           Medications Prior to Admission:     Medications Prior to Admission   Medication Sig Dispense Refill Last Dose     clobetasol propionate (CLOBEX) 0.05 % external shampoo Apply topically to scalp three times per week 118 mL 2 More than a month     fluocinonide (LIDEX) 0.05 % external solution Apply to scalp BID x 1-2 weeks PRN 50 mL 3 More than a month     ketoconazole (NIZORAL) 2 % external shampoo Use daily as needed 120 mL 11 More than a month     Prenatal Vit-Fe Fumarate-FA (PRENATAL MULTIVITAMIN W/IRON) 27-0.8 MG tablet Take 1 tablet by mouth daily    3/7/2023     triamcinolone (KENALOG) 0.1 % external cream Apply to ears twice per day as needed 30 g 2 Past Month             Discharge Medications:     Current Discharge Medication List      START taking these medications    Details   ascorbic acid (VITAMIN C) 250 MG CHEW chewable tablet Take 1 tablet (250 mg) by mouth daily Take with iron supplement.  Qty: 90 tablet, Refills: 1    Associated Diagnoses: Anemia due to blood loss, acute      cyanocobalamin 1000 MCG sublingual tablet Place 1 tablet (1,000 mcg) under the tongue daily  Qty: 90 tablet, Refills: 1    Associated Diagnoses: Anemia due to blood loss, acute      ferrous sulfate (FEROSUL) 325 (65 Fe) MG tablet Take 1 tablet (325 mg) by mouth  daily (with breakfast)  Qty: 90 tablet, Refills: 1    Associated Diagnoses: Anemia due to blood loss, acute      ibuprofen (ADVIL/MOTRIN) 800 MG tablet Take 1 tablet (800 mg) by mouth every 6 hours as needed for moderate pain (4-6) (For cramping)  Qty: 30 tablet, Refills: 0    Associated Diagnoses: S/P  section      oxyCODONE (ROXICODONE) 5 MG tablet Take 1 tablet (5 mg) by mouth every 6 hours as needed for severe pain (7-10)  Qty: 12 tablet, Refills: 0    Associated Diagnoses: S/P  section      senna-docusate (SENOKOT-S/PERICOLACE) 8.6-50 MG tablet Take 1 tablet by mouth 2 times daily as needed for constipation  Qty: 30 tablet, Refills: 0    Associated Diagnoses: S/P  section         CONTINUE these medications which have NOT CHANGED    Details   clobetasol propionate (CLOBEX) 0.05 % external shampoo Apply topically to scalp three times per week  Qty: 118 mL, Refills: 2    Associated Diagnoses: Psoriasis      fluocinonide (LIDEX) 0.05 % external solution Apply to scalp BID x 1-2 weeks PRN  Qty: 50 mL, Refills: 3    Associated Diagnoses: Psoriasis      ketoconazole (NIZORAL) 2 % external shampoo Use daily as needed  Qty: 120 mL, Refills: 11    Associated Diagnoses: Psoriasis      Prenatal Vit-Fe Fumarate-FA (PRENATAL MULTIVITAMIN W/IRON) 27-0.8 MG tablet Take 1 tablet by mouth daily       triamcinolone (KENALOG) 0.1 % external cream Apply to ears twice per day as needed  Qty: 30 g, Refills: 2    Associated Diagnoses: Psoriasis                   Consultations:   No consultations were requested during this admission          Brief History of Labor or Admission:   Anu Davis is a 32 year old  at 39w2d admitted for repeat C section. Delivery of viable male  with Apgars of 9/9. Weight of 7#13oz.           Hospital Course:   The patient's hospital course was unremarkable.  She recovered as anticipated and experienced no post-operative complications. On discharge, her pain was well  controlled. Vaginal bleeding is similar to peak menstrual flow.  Voiding without difficulty.  Ambulating well and tolerating a normal diet.  No fever or significant wound drainage.  Breastfeeding well.  Infant is stable.  She has had a bowel movement.  She was discharged on post-partum day #2.    Post-partum hemoglobin:   Hemoglobin   Date Value Ref Range Status   03/09/2023 9.4 (L) 11.7 - 15.7 g/dL Final   04/02/2018 9.0 (L) 11.7 - 15.7 g/dL Final             Discharge Instructions and Follow-Up:   Discharge diet: Regular   Discharge activity: No heavy lifting for 6 week(s)  No driving or operating machinery while on narcotic analgesics  Pelvic rest: abstain from intercourse and do not use tampons for 6 week(s)   Discharge follow-up: Follow up with OBGYN in 6 weeks   Wound care: Ice to area for comfort           Discharge Disposition:   Discharged to home      Attestation:  I have reviewed today's vital signs, notes, medications, labs and imaging.    Monik Turcios DO

## 2023-03-10 NOTE — PLAN OF CARE
Oral pain medication controlling incisional pain. Bonding with infant, more confident with nursing.

## 2023-03-13 NOTE — ADDENDUM NOTE
Addendum  created 03/13/23 0830 by Deysi Macedo APRN CRNA    Intraprocedure Event edited, Intraprocedure Staff edited

## 2023-03-20 ENCOUNTER — MEDICAL CORRESPONDENCE (OUTPATIENT)
Dept: HEALTH INFORMATION MANAGEMENT | Facility: CLINIC | Age: 33
End: 2023-03-20

## 2023-04-04 ENCOUNTER — MEDICAL CORRESPONDENCE (OUTPATIENT)
Dept: HEALTH INFORMATION MANAGEMENT | Facility: CLINIC | Age: 33
End: 2023-04-04
Payer: COMMERCIAL

## 2023-04-20 ENCOUNTER — PRENATAL OFFICE VISIT (OUTPATIENT)
Dept: OBGYN | Facility: CLINIC | Age: 33
End: 2023-04-20
Payer: COMMERCIAL

## 2023-04-20 VITALS
TEMPERATURE: 98.1 F | HEART RATE: 94 BPM | SYSTOLIC BLOOD PRESSURE: 110 MMHG | BODY MASS INDEX: 34.28 KG/M2 | DIASTOLIC BLOOD PRESSURE: 75 MMHG | WEIGHT: 164 LBS

## 2023-04-20 DIAGNOSIS — D35.2 PROLACTINOMA (H): ICD-10-CM

## 2023-04-20 PROCEDURE — 99207 PR POST PARTUM EXAM: CPT | Performed by: OBSTETRICS & GYNECOLOGY

## 2023-04-20 RX ORDER — CABERGOLINE 0.5 MG/1
0.25 TABLET ORAL
Qty: 20 TABLET | Refills: 1 | Status: SHIPPED | OUTPATIENT
Start: 2023-04-20 | End: 2024-05-14

## 2023-04-20 ASSESSMENT — PATIENT HEALTH QUESTIONNAIRE - PHQ9: SUM OF ALL RESPONSES TO PHQ QUESTIONS 1-9: 1

## 2023-04-20 NOTE — PROGRESS NOTES
6 week Postpartum Visit Note    S:  Anu Davis is here for her 6-week postpartum checkup.     Delivery Date: 3/8  Delivering provider:  Negrita  Type of delivery:  RLTCS    Feeding Method:  bottle  Bleeding:  Resolved  Bowel/Urinary problems:  Denies  Mood: denies concern, PHQ-9 score: 1    Contraception Planned:  Declines, reviewed options ================================================================  ROS: 10 point ROS neg other than the symptoms noted above in the HPI.     O:  EXAM:  LMP 2022     General: alert, oriented, well appearing  Psych: mood appropriate,  Breasts:  no swelling or redness  Abdomen: soft, non tender, uterus properly involuted,   Incision:  well healed, no redness or swelling    A/P:   32 year old  s/p CS x6w, doing well. No concerns.     Prolactinoma- restarted cabergoline, rec an endo consult to confirm appropriate dose as I do not typically manage this long term  Contraception: declines, reviewed options  Feeding: bottle    Desiree Rees MD   2023 10:04 AM

## 2023-04-20 NOTE — NURSING NOTE
"Initial /75 (BP Location: Right arm, Patient Position: Chair, Cuff Size: Adult Regular)   Pulse 94   Temp 98.1  F (36.7  C) (Tympanic)   Wt 74.4 kg (164 lb)   LMP 06/09/2022   Breastfeeding No   BMI 34.28 kg/m   Estimated body mass index is 34.28 kg/m  as calculated from the following:    Height as of 2/24/23: 1.473 m (4' 10\").    Weight as of this encounter: 74.4 kg (164 lb). .    Addis Borges MA    "

## 2023-05-04 ENCOUNTER — MEDICAL CORRESPONDENCE (OUTPATIENT)
Dept: HEALTH INFORMATION MANAGEMENT | Facility: CLINIC | Age: 33
End: 2023-05-04

## 2023-06-04 ENCOUNTER — HEALTH MAINTENANCE LETTER (OUTPATIENT)
Age: 33
End: 2023-06-04

## 2023-06-26 ENCOUNTER — MEDICAL CORRESPONDENCE (OUTPATIENT)
Dept: HEALTH INFORMATION MANAGEMENT | Facility: CLINIC | Age: 33
End: 2023-06-26

## 2023-06-27 ENCOUNTER — OFFICE VISIT (OUTPATIENT)
Dept: OBGYN | Facility: CLINIC | Age: 33
End: 2023-06-27
Payer: COMMERCIAL

## 2023-06-27 VITALS
WEIGHT: 168 LBS | SYSTOLIC BLOOD PRESSURE: 98 MMHG | DIASTOLIC BLOOD PRESSURE: 65 MMHG | RESPIRATION RATE: 16 BRPM | TEMPERATURE: 99.1 F | HEIGHT: 58 IN | BODY MASS INDEX: 35.26 KG/M2 | HEART RATE: 77 BPM

## 2023-06-27 DIAGNOSIS — Z34.82 PRENATAL CARE, SUBSEQUENT PREGNANCY IN SECOND TRIMESTER: ICD-10-CM

## 2023-06-27 DIAGNOSIS — R10.11 RUQ ABDOMINAL PAIN: ICD-10-CM

## 2023-06-27 DIAGNOSIS — N93.9 ABNORMAL UTERINE BLEEDING (AUB): Primary | ICD-10-CM

## 2023-06-27 DIAGNOSIS — N92.1 MENORRHAGIA WITH IRREGULAR CYCLE: ICD-10-CM

## 2023-06-27 LAB
ALT SERPL W P-5'-P-CCNC: 38 U/L (ref 0–50)
AST SERPL W P-5'-P-CCNC: 25 U/L (ref 0–45)
BASOPHILS # BLD AUTO: 0.1 10E3/UL (ref 0–0.2)
BASOPHILS NFR BLD AUTO: 1 %
EOSINOPHIL # BLD AUTO: 0.1 10E3/UL (ref 0–0.7)
EOSINOPHIL NFR BLD AUTO: 1 %
ERYTHROCYTE [DISTWIDTH] IN BLOOD BY AUTOMATED COUNT: 13.5 % (ref 10–15)
HCG SERPL QL: NEGATIVE
HCT VFR BLD AUTO: 39.2 % (ref 35–47)
HGB BLD-MCNC: 12.6 G/DL (ref 11.7–15.7)
IMM GRANULOCYTES # BLD: 0 10E3/UL
IMM GRANULOCYTES NFR BLD: 0 %
LIPASE SERPL-CCNC: 25 U/L (ref 13–60)
LYMPHOCYTES # BLD AUTO: 2 10E3/UL (ref 0.8–5.3)
LYMPHOCYTES NFR BLD AUTO: 27 %
MCH RBC QN AUTO: 27.6 PG (ref 26.5–33)
MCHC RBC AUTO-ENTMCNC: 32.1 G/DL (ref 31.5–36.5)
MCV RBC AUTO: 86 FL (ref 78–100)
MONOCYTES # BLD AUTO: 0.6 10E3/UL (ref 0–1.3)
MONOCYTES NFR BLD AUTO: 8 %
NEUTROPHILS # BLD AUTO: 4.6 10E3/UL (ref 1.6–8.3)
NEUTROPHILS NFR BLD AUTO: 63 %
PLATELET # BLD AUTO: 340 10E3/UL (ref 150–450)
RBC # BLD AUTO: 4.56 10E6/UL (ref 3.8–5.2)
WBC # BLD AUTO: 7.3 10E3/UL (ref 4–11)

## 2023-06-27 PROCEDURE — 84450 TRANSFERASE (AST) (SGOT): CPT | Performed by: OBSTETRICS & GYNECOLOGY

## 2023-06-27 PROCEDURE — 85025 COMPLETE CBC W/AUTO DIFF WBC: CPT | Performed by: OBSTETRICS & GYNECOLOGY

## 2023-06-27 PROCEDURE — 84703 CHORIONIC GONADOTROPIN ASSAY: CPT | Performed by: OBSTETRICS & GYNECOLOGY

## 2023-06-27 PROCEDURE — 83690 ASSAY OF LIPASE: CPT | Performed by: OBSTETRICS & GYNECOLOGY

## 2023-06-27 PROCEDURE — 84460 ALANINE AMINO (ALT) (SGPT): CPT | Performed by: OBSTETRICS & GYNECOLOGY

## 2023-06-27 PROCEDURE — 99214 OFFICE O/P EST MOD 30 MIN: CPT | Performed by: OBSTETRICS & GYNECOLOGY

## 2023-06-27 PROCEDURE — 36415 COLL VENOUS BLD VENIPUNCTURE: CPT | Performed by: OBSTETRICS & GYNECOLOGY

## 2023-06-27 RX ORDER — TRANEXAMIC ACID 650 MG/1
1300 TABLET ORAL 2 TIMES DAILY
Qty: 20 TABLET | Refills: 0 | Status: SHIPPED | OUTPATIENT
Start: 2023-06-27 | End: 2023-07-02

## 2023-06-27 NOTE — PROGRESS NOTES
"CC:  Follow up  from  section  4 months ago for heavy painless bleeding  HPI:  Anu Davis is a 32 year old female is a   .  Patient's last menstrual period was 2023. this is the first period since her  section.  Her procedure was uncomplicated. She is currently bottle feeding and her baby is doing fine.  She is sexually active using condoms for contraception.  She started painless spotting on Saturday which subsequently morphed into heavy painless bleeding \"Like [place]\"  She is currently bleeding and soaking super pads every 1.5 hours.  She is up at night to change pads. She is feeling lightheaded.  She had anemia to 9.4 post op and was on Ferrous sulfate until she ran out.  She has no other bleeding diatheses.    Patients records are available and reviewed at today's visit.    Past GYN history:  No STD history       Last PAP smear:  Normal  Last TSH:   TSH   Date Value Ref Range Status   2020 1.73 0.40 - 4.00 mU/L Final      , normal?  Yes    Past Medical History:   Diagnosis Date     Chickenpox      Ganglion of joint 2011     Tenosynovitis        Past Surgical History:   Procedure Laterality Date      SECTION N/A 2018    Procedure:  SECTION;;  Surgeon: Wang Pelletier MD;  Location: [facility]      SECTION N/A 3/8/2023    Procedure:  SECTION;  Surgeon: Desiree Rees MD;  Location: [facility]     MOUTH SURGERY      wisdom teeth     WRIST SURGERY Right     ganglion removal       Family History   Problem Relation Age of Onset     Depression Mother         situational     Medical History Unknown Mother         mother adopted     Hypertension Father      Diverticulitis Father      Colon Polyps Father      Emphysema Paternal Grandmother      Other - See Comments Paternal Grandmother         MVA       Allergies: Bees    Current Outpatient Medications   Medication Sig Dispense Refill     cabergoline (DOSTINEX) 0.5 " "MG tablet Take 0.5 tablets (0.25 mg) by mouth twice a week 20 tablet 1     clobetasol propionate (CLOBEX) 0.05 % external shampoo Apply topically to scalp three times per week 118 mL 2     fluocinonide (LIDEX) 0.05 % external solution Apply to scalp BID x 1-2 weeks PRN 50 mL 3     ketoconazole (NIZORAL) 2 % external shampoo Use daily as needed 120 mL 11     tranexamic acid (LYSTEDA) 650 MG tablet Take 2 tablets (1,300 mg) by mouth 2 times daily for 5 days 20 tablet 0     triamcinolone (KENALOG) 0.1 % external cream Apply to ears twice per day as needed 30 g 2     ferrous sulfate (FEROSUL) 325 (65 Fe) MG tablet Take 1 tablet (325 mg) by mouth daily (with breakfast) (Patient not taking: Reported on 6/27/2023) 90 tablet 1       ROS:  C: NEGATIVE for fever, chills, change in weight  I: NEGATIVE for worrisome rashes, moles or lesions  E: NEGATIVE for vision changes or irritation  E/M: NEGATIVE for ear, mouth and throat problems  R: NEGATIVE for significant cough or SOB  CV: NEGATIVE for chest pain, palpitations or peripheral edema  GI: NEGATIVE for nausea, abdominal pain, heartburn, or change in bowel habits  : NEGATIVE for frequency, dysuria, hematuria, vaginal discharge  M: NEGATIVE for significant arthralgias or myalgia  N: NEGATIVE for weakness, dizziness or paresthesias  E: NEGATIVE for temperature intolerance, skin/hair changes  P: NEGATIVE for changes in mood or affect    EXAM:  Blood pressure 98/65, pulse 77, temperature 99.1  F (37.3  C), temperature source Tympanic, resp. rate 16, height 1.473 m (4' 10\"), weight 76.2 kg (168 lb), last menstrual period 06/24/2023, not currently breastfeeding.   BMI= Body mass index is 35.11 kg/m .  General - pleasant female in no acute distress.  Neck - supple without lymphadenopathy or thyromegaly.  Abdomen - soft, nontender, nondistended, no hepatosplenomegaly. Well healed Pfannenstiel scar.  Pelvic - EG: normal adult female,   BUS: within normal limits,   Vagina: well " rugated, no discharge,   Cervix: no lesions or CMT,   Uterus: firm, normal sized and nontender,   Adnexae: no masses or tenderness.  Rectovaginal - large firm stool in the rectum.  Musculoskeletal - no gross deformities.  Neurological - normal strength, sensation, and mental status.  Skin- no bruising    ASSESSMENT/PLAN:  (N93.9) Abnormal uterine bleeding (AUB)  (primary encounter diagnosis)  Comment: first period after  delivery  Plan: CBC with platelets and differential, HCG         qualitative, Blood ([id number]), tranexamic acid         (LYSTEDA) 650 MG tablet            (N92.1) Menorrhagia with irregular cycle  Comment: Negative HCG and normal Hgb  Plan: tranexamic acid (LYSTEDA) 650 MG tablet        1300 mg BID x 5 days        Letter will be sent to the referring provider.    Elver Jean MD

## 2023-06-27 NOTE — NURSING NOTE
"Initial BP 98/65 (BP Location: Right arm, Patient Position: Chair, Cuff Size: Adult Regular)   Pulse 77   Temp 99.1  F (37.3  C) (Tympanic)   Resp 16   Ht 1.473 m (4' 10\")   Wt 76.2 kg (168 lb)   LMP 06/24/2023   Breastfeeding No   BMI 35.11 kg/m   Estimated body mass index is 35.11 kg/m  as calculated from the following:    Height as of this encounter: 1.473 m (4' 10\").    Weight as of this encounter: 76.2 kg (168 lb). .      "

## 2023-08-14 ENCOUNTER — OFFICE VISIT (OUTPATIENT)
Dept: DERMATOLOGY | Facility: CLINIC | Age: 33
End: 2023-08-14
Payer: COMMERCIAL

## 2023-08-14 DIAGNOSIS — L81.1 MELASMA: Primary | ICD-10-CM

## 2023-08-14 DIAGNOSIS — L40.9 PSORIASIS: ICD-10-CM

## 2023-08-14 PROCEDURE — 99213 OFFICE O/P EST LOW 20 MIN: CPT | Performed by: PHYSICIAN ASSISTANT

## 2023-08-14 RX ORDER — CLOBETASOL PROPIONATE 0.05 G/100ML
SHAMPOO TOPICAL
Qty: 118 ML | Refills: 4 | Status: SHIPPED | OUTPATIENT
Start: 2023-08-14

## 2023-08-14 RX ORDER — FLUOCINONIDE TOPICAL SOLUTION USP, 0.05% 0.5 MG/ML
SOLUTION TOPICAL
Qty: 50 ML | Refills: 3 | Status: SHIPPED | OUTPATIENT
Start: 2023-08-14

## 2023-08-14 RX ORDER — KETOCONAZOLE 20 MG/ML
SHAMPOO TOPICAL
Qty: 120 ML | Refills: 11 | Status: SHIPPED | OUTPATIENT
Start: 2023-08-14

## 2023-08-14 ASSESSMENT — PAIN SCALES - GENERAL: PAINLEVEL: NO PAIN (0)

## 2023-08-14 NOTE — PROGRESS NOTES
HPI:   Chief complaints: Anu Davis is a 32 year old female who presents for recheck of scalp psoriasis. She is using ketoconazole shampoo alternating with clobetasol shampoo as needed and lidex as needed. She clear and is happy with control. She also has new brown spots on the face she would like checked       Shx:  - has a 5 mo old son Noel and a 4 yo daughter Chet       PHYSICAL EXAM:    LMP 06/24/2023   Skin exam performed as follows: Type 2 skin. Mood appropriate  Alert and Oriented X 3. Well developed, well nourished in no distress.  General appearance: Normal  Head including face: Normal  Eyes: conjunctiva and lids: Normal  Mouth: Lips, teeth, gums: Normal  Neck: Normal  Chest-breast/axillae: Normal  Back: Normal  Spleen and liver: Normal  Cardiovascular: Exam of peripheral vascular system by observation for swelling, varicosities, edema: Normal  Genitalia: groin, buttocks: Normal  Extremities: digits/nails (clubbing): Normal  Eccrine and Apocrine glands: Normal  Right upper extremity: Normal  Left upper extremity: Normal  Right lower extremity: Normal  Left lower extremity: Normal  Skin: Scalp and body hair: See below    1. Skin clear  2. Patchy brown pigmentation across forehead      ASSESSMENT/PLAN:     Psoriasis - doing well on current regimen.  --Continue ketoconazole shampoo daily as needed alternating with clobetasol shampoo as needed  --Continue lidex BID x 1-2 weeks then PRN    2. Melasma on face - advised. Discussed that condition is secondary to sunlight and hormones. Advised on diligent use of SPF and topical creams. Not bothersome to her at this point; no treatment needed.         Follow-up: yearly  CC:   Scribed By: Reyna Huitron, MS, PAWILLIAM

## 2023-08-14 NOTE — LETTER
8/14/2023         RE: Anu Davis  [address]  [place] [address]        Dear Colleague,    Thank you for referring your patient, Anu Davis, to the [facility]. Please see a copy of my visit note below.    HPI:   Chief complaints: Anu Davis is a 32 year old female who presents for recheck of scalp psoriasis. She is using ketoconazole shampoo alternating with clobetasol shampoo as needed and lidex as needed. She clear and is happy with control. She also has new brown spots on the face she would like checked       Shx:  - has a 5 mo old son Noel and a 4 yo daughter Chet       PHYSICAL EXAM:    LMP 06/24/2023   Skin exam performed as follows: Type 2 skin. Mood appropriate  Alert and Oriented X 3. Well developed, well nourished in no distress.  General appearance: Normal  Head including face: Normal  Eyes: conjunctiva and lids: Normal  Mouth: Lips, teeth, gums: Normal  Neck: Normal  Chest-breast/axillae: Normal  Back: Normal  Spleen and liver: Normal  Cardiovascular: Exam of peripheral vascular system by observation for swelling, varicosities, edema: Normal  Genitalia: groin, buttocks: Normal  Extremities: digits/nails (clubbing): Normal  Eccrine and Apocrine glands: Normal  Right upper extremity: Normal  Left upper extremity: Normal  Right lower extremity: Normal  Left lower extremity: Normal  Skin: Scalp and body hair: See below    1. Skin clear  2. Patchy brown pigmentation across forehead      ASSESSMENT/PLAN:     Psoriasis - doing well on current regimen.  --Continue ketoconazole shampoo daily as needed alternating with clobetasol shampoo as needed  --Continue lidex BID x 1-2 weeks then PRN    2. Melasma on face - advised. Discussed that condition is secondary to sunlight and hormones. Advised on diligent use of SPF and topical creams. Not bothersome to her at this point; no treatment needed.         Follow-up: yearly  CC:   Scribed By: Reyna  Tomy MS, PARodolfoC      Again, thank you for allowing me to participate in the care of your patient.        Sincerely,        Reyna Leonard PA-C

## 2023-10-09 NOTE — PLAN OF CARE
Encounter Date: 10/9/2023       History     Chief Complaint   Patient presents with    Shortness of Breath     Arrived via EMS from home with c/o SOB. Hx of COPD.     COPD     Ms. Fam is a 73 yo F with a PMHx of COPD, a fib, recurrent lobar pneumonia and pulmonary arterial hypertension who presents with SOB for the past week. She has been congested during this time but has not been coughing up any sputum. Her cough has increased in frequency. Patient has a recent hospitalization in August for COPD exacerbation where she developed a fib. Endorses H/A and fatigue. No N/V/D, chest pain, abdominal pain, vision changes or lightheadedness/dizziness. She is on 2.5 L of O2 at home.     The history is provided by the patient and medical records.     Review of patient's allergies indicates:   Allergen Reactions    Albuterol     Epinephrine     Ipratropium      Past Medical History:   Diagnosis Date    Cataract     COPD (chronic obstructive pulmonary disease)     COVID-19     History of COVID-19 1/17/2021    Still with mild dyspnea. Encouraged return to pulmonary rehab Recommend scheduling vaccination when appointment is available.     History of retinal hemorrhage     Lobar pneumonia 11/14/2021    Recurrent in RLL, no endobronchial lesion Needs speech evaluation and MBSS for recurrent aspiration in setting of dysphagia  Will need pulmonary rehab at [facility].    Pathological fracture due to osteoporosis 7/6/2020    Retinal histoplasmosis     Secondary pulmonary arterial hypertension 7/3/2018    Secondary to emphysema and chronic respiratory failure, mild.     Past Surgical History:   Procedure Laterality Date    BRONCHOSCOPY WITH FLUOROSCOPY N/A 10/28/2019    Procedure: BRONCHOSCOPY, WITH FLUOROSCOPY;  Surgeon: Brooklynn Ruiz MD;  Location: [facility] OR [address] [place] [address];  Service: Endoscopy;  Laterality: N/A;    HAND SURGERY       Family History   Problem Relation Age of Onset    Macular degeneration Mother     Colon cancer Mother      Problem: Patient Care Overview  Goal: Plan of Care/Patient Progress Review  Outcome: Improving  Patient reporting minimal discomfort with breastfeeding, encouraged use of lanolin and breast pads in between feedings. Minimal assistance needed from staff to achieve good latch. Ice to abdominal incision. Incision is approximated, no drainage. Patient showered today. Viewed postpartum depression video and completed 24 hour post partum depression screening. Managing abdominal discomfort with scheduled tylenol and prn oxycodone and ibuprofen. Perineum slightly swollen, pt declined ice packs. Ambulated in hallway with spouse x1, up with stand by assist in her room. Voiding without difficulty. Encouraged frequent skin to skin. VS stable.     Parents request pacifier for  infant: parents informed about impact of pacifier on breastfeeding success (latch problems, nipple confusion, lower milk supply) and AAP best practice recommendation not to use pacifier for  baby before one month of age.  Parents instructed on other soothing techniques for fussy baby.         Stroke Father     Colon cancer Father     Amblyopia Neg Hx     Blindness Neg Hx     Cataracts Neg Hx     Diabetes Neg Hx     Glaucoma Neg Hx     Hypertension Neg Hx     Retinal detachment Neg Hx     Strabismus Neg Hx     Thyroid disease Neg Hx      Social History     Tobacco Use    Smoking status: Former     Current packs/day: 0.00     Average packs/day: 1 pack/day for 36.0 years (36.0 ttl pk-yrs)     Types: Cigarettes     Start date: 1980     Quit date: 2016     Years since quittin.1    Smokeless tobacco: Never    Tobacco comments:     pt states she does not remember date   Substance Use Topics    Alcohol use: Yes     Alcohol/week: 2.0 standard drinks of alcohol     Types: 2 Glasses of wine per week     Comment: 1-2 glasses a day     Drug use: No     Review of Systems    Physical Exam     Initial Vitals [10/09/23 1718]   BP Pulse Resp Temp SpO2   137/80 101 (!) 22 98.4 °F (36.9 °C) 100 %      MAP       --         Physical Exam    Constitutional: She appears ill.   HENT:   Head: Normocephalic and atraumatic.   Eyes: Conjunctivae are normal.   Neck: Neck supple.   Normal range of motion.  Cardiovascular:  Regular rhythm and intact distal pulses.   Tachycardia present.         Pulmonary/Chest: Tachypnea noted. She has decreased breath sounds.   Abdominal: Abdomen is soft. Bowel sounds are normal. She exhibits no distension. There is no abdominal tenderness.   Musculoskeletal:         General: No edema.      Cervical back: Normal range of motion and neck supple.     Neurological: She is alert and oriented to person, place, and time.   Skin: Skin is warm and dry. Capillary refill takes less than 2 seconds.         ED Course   Procedures  Labs Reviewed   CBC W/ AUTO DIFFERENTIAL - Abnormal; Notable for the following components:       Result Value    MPV 9.0 (*)     Lymph % 17.6 (*)     All other components within normal limits    Narrative:     Release to patient->Immediate   COMPREHENSIVE METABOLIC PANEL -  Abnormal; Notable for the following components:    Glucose 111 (*)     Calcium 8.6 (*)     All other components within normal limits    Narrative:     Release to patient->Immediate   URINALYSIS, REFLEX TO URINE CULTURE - Abnormal; Notable for the following components:    Appearance, UA Hazy (*)     Protein, UA Trace (*)     Glucose, UA 3+ (*)     Ketones, UA 1+ (*)     Leukocytes, UA 3+ (*)     All other components within normal limits    Narrative:     Specimen Source->Urine   CBC W/ AUTO DIFFERENTIAL - Abnormal; Notable for the following components:    RBC 3.88 (*)     Hemoglobin 11.7 (*)     Hematocrit 36.1 (*)     MPV 9.0 (*)     Lymph # 0.3 (*)     Mono # 0.1 (*)     Gran % 92.7 (*)     Lymph % 5.4 (*)     Mono % 1.3 (*)     All other components within normal limits   COMPREHENSIVE METABOLIC PANEL - Abnormal; Notable for the following components:    Sodium 133 (*)     Glucose 180 (*)     Calcium 8.4 (*)     Anion Gap 7 (*)     All other components within normal limits   MAGNESIUM - Abnormal; Notable for the following components:    Magnesium 2.9 (*)     All other components within normal limits   URINALYSIS MICROSCOPIC - Abnormal; Notable for the following components:    WBC, UA 45 (*)     Bacteria Many (*)     All other components within normal limits    Narrative:     Specimen Source->Urine   ISTAT PROCEDURE - Abnormal; Notable for the following components:    POC PH 7.341 (*)     POC PCO2 60.6 (*)     POC HCO3 32.8 (*)     POC Sodium 134 (*)     POC TCO2 35 (*)     All other components within normal limits   CULTURE, URINE   HIV 1 / 2 ANTIBODY    Narrative:     Release to patient->Immediate   HEPATITIS C ANTIBODY    Narrative:     Release to patient->Immediate   SARS-COV-2 RNA AMPLIFICATION, QUAL   PHOSPHORUS   POCT GLUCOSE MONITORING CONTINUOUS        ECG Results              EKG 12-lead (Final result)  Result time 10/10/23 08:08:33      Final result by Interface, Lab In [facility] (10/10/23 08:08:33)                    Narrative:    Test Reason : R06.02,    Vent. Rate : 099 BPM     Atrial Rate : 099 BPM     P-R Int : 200 ms          QRS Dur : 110 ms      QT Int : 350 ms       P-R-T Axes : 081 076 068 degrees     QTc Int : 449 ms    Normal sinus rhythm  Incomplete right bundle branch block  Borderline Abnormal ECG  When compared with ECG of 22-AUG-2023 14:08,  No significant change was found  Confirmed by BRITANY FRIED MD ([id number]) on 10/10/2023 8:08:20 AM    Referred By: SHAWN   SELF           Confirmed By:BRITANY FRIED MD                                  Imaging Results              X-Ray Chest 1 View (Final result)  Result time 10/09/23 18:39:23      Final result by Quintin Mack MD (10/09/23 18:39:23)                   Impression:      1. Chronic appearing pulmonary findings suggesting underlying COPD/emphysema.  No new large focal consolidation.      Electronically signed by: Quintin Mack MD  Date:    10/09/2023  Time:    18:39               Narrative:    EXAMINATION:  XR CHEST 1 VIEW    CLINICAL HISTORY:  shortness of breath;    TECHNIQUE:  Single frontal view of the chest was performed.    COMPARISON:  08/10/2023, CT 08/10/2023    FINDINGS:  The cardiomediastinal silhouette is not enlarged.  There is no pleural effusion.  The trachea is midline.  The lungs are symmetrically expanded bilaterally with coarse interstitial attenuation bilaterally noting scattered regions of bandlike atelectasis or scarring particularly involving the right upper lung zone.  There is left basilar subsegmental atelectasis..  There is no pneumothorax.  The osseous structures are remarkable for degenerative changes..  There may be a few scattered calcified granuloma.  Pulmonary nodules identified on CT 08/13/2023 are not readily apparent by plain radiography, please see that report.                                       Medications   ascorbic acid (vitamin C) tablet 500 mg (500 mg Oral Given 10/10/23 2200)   azelastine 137 mcg  (0.1 %) nasal spray 137 mcg (0 mcg Nasal Hold 10/10/23 2100)   calcium carbonate 200 mg calcium (500 mg) chewable tablet 500 mg (500 mg Oral Given 10/10/23 0846)   citalopram tablet 20 mg (20 mg Oral Given 10/10/23 0846)   diltiaZEM 24 hr capsule 120 mg (120 mg Oral Given 10/10/23 0846)   sodium chloride 0.65 % nasal spray 1 spray (has no administration in time range)   oxybutynin 24 hr tablet 5 mg (5 mg Oral Given 10/10/23 0846)   busPIRone tablet 5 mg (5 mg Oral Given 10/10/23 2200)   sodium chloride 0.9% flush 3 mL (has no administration in time range)   enoxaparin injection 40 mg (40 mg Subcutaneous Given 10/10/23 1700)   melatonin tablet 6 mg (has no administration in time range)   ondansetron disintegrating tablet 8 mg (has no administration in time range)   senna-docusate 8.6-50 mg per tablet 1 tablet (1 tablet Oral Given 10/10/23 2200)   polyethylene glycol packet 17 g (17 g Oral Not Given 10/10/23 0900)   acetaminophen tablet 650 mg (650 mg Oral Given 10/10/23 0849)   fluticasone furoate-vilanteroL 100-25 mcg/dose diskus inhaler 1 puff (1 puff Inhalation Not Given 10/11/23 0900)   guaiFENesin 12 hr tablet 600 mg (600 mg Oral Given 10/10/23 2200)   meloxicam tablet 7.5 mg (7.5 mg Oral Given 10/10/23 0846)   glucose chewable tablet 16 g (has no administration in time range)   glucose chewable tablet 24 g (has no administration in time range)   glucagon (human recombinant) injection 1 mg (has no administration in time range)   insulin aspart U-100 pen 0-5 Units (has no administration in time range)   dextrose 10% bolus 125 mL 125 mL (has no administration in time range)   dextrose 10% bolus 250 mL 250 mL (has no administration in time range)   piperacillin-tazobactam (ZOSYN) 4.5 g in dextrose 5 % in water (D5W) 100 mL IVPB (MB+) (0 g Intravenous Stopped 10/11/23 0628)   methylPREDNISolone sodium succinate injection 40 mg (40 mg Intravenous Given 10/11/23 0700)   levalbuterol nebulizer solution 1.25 mg (1.25 mg  Nebulization Not Given 10/11/23 0800)   levalbuterol nebulizer solution 0.63 mg (has no administration in time range)   sodium chloride 3% nebulizer solution 4 mL (4 mLs Nebulization Not Given 10/11/23 0800)   LORazepam injection 1 mg (1 mg Intravenous Given 10/10/23 1625)   influenza 65up-adj (QUADRIVALENT ADJUVANTED PF) vaccine 0.5 mL (has no administration in time range)   levalbuterol nebulizer solution 1.25 mg (1.25 mg Nebulization Given 10/9/23 1748)   methylPREDNISolone sodium succinate injection 125 mg (125 mg Intravenous Given 10/9/23 1811)   magnesium sulfate 2g in water 50mL IVPB (premix) (0 g Intravenous Stopped 10/9/23 2217)   cefTRIAXone (ROCEPHIN) 1 g in dextrose 5 % in water (D5W) 100 mL IVPB (MB+) (0 g Intravenous Stopped 10/9/23 1926)     Medical Decision Making  Patient is a 75 yo F with a PMHx of COPD, recurrent lobar pneumonia, and pulmonary arterial HTN who presents for worsening SOB and chest congestion over the past week. Patient likely to have COPD exacerbation in the setting of increasing cough and SOB with decreased breath sounds on exam. Given levalbuterol, magnesium and methylprednisolone with improvement symptoms and decreasing oxygen requirements. Unlikely to be an infectious etiology with no fevers, chills, night sweats and a normal WBC. CXR shows no new focal consolidations. PE less likely due the slow onset of symptoms. Patient will be admitted to hospital medicine for observation.    Amount and/or Complexity of Data Reviewed  Labs: ordered.  Radiology: ordered.    Risk  Prescription drug management.  Decision regarding hospitalization.              Attending Attestation:   Physician Attestation Statement for Resident:  As the supervising MD   Physician Attestation Statement: I have personally seen and examined this patient.   I agree with the above history.  -:   As the supervising MD I agree with the above PE.     As the supervising MD I agree with the above treatment, course,  plan, and disposition.     I have reviewed the following: old EKGs, old x-rays, x-ray reports, CT reports and EKG reports.        Attending Critical Care:   Critical Care Times:   Direct Patient Care (initial evaluation, reassessments, and time considering the case)................................................................15 minutes.   Additional History from reviewing old medical records or taking additional history from the family, EMS, PCP, etc.......................5 minutes.   Ordering, Reviewing, and Interpreting Diagnostic Studies...............................................................................................................5 minutes.   Documentation..................................................................................................................................................................................5 minutes.   Consultation with other Physicians. .................................................................................................................................................5 minutes.   ==============================================================  Total Critical Care Time - exclusive of procedural time: 35 minutes.  ==============================================================  Critical care was necessary to treat or prevent imminent or life-threatening deterioration of the following conditions: COPD exacerbation and respiratory failure.   The following critical care procedures were done by me (see procedure notes): pulse oximetry.   Critical care was time spent personally by me on the following activities: obtaining history from patient or relative, examination of patient, review of old charts, development of treatment plan with patient or relative, ordering lab, x-rays, and/or EKG, ordering and performing treatments and interventions, evaluation of patient's response to treatment, discussion with consultants, interpretation of cardiac  measurements and re-evaluation of patient's conition.   Critical Care Condition: life-threatening           ED Course as of 10/11/23 0920   Mon Oct 09, 2023   1908 Presentation consistent with acute on chronic emphysema.  Examination shows a barrel chested patient with diminished air movement and increased work of breathing with increased oxygen requirement.  Oxygen titrated to goal pulse oximetry of 88-92%.  Short interval levalbuterol nebulizers ordered.  Magnesium ordered due to epi intolerance.  IV steroids given. [DS]      ED Course User Index  [DS] Sid, Nabeel GARG MD                    Clinical Impression:   Final diagnoses:  [R06.02] Shortness of breath  [J44.1] COPD exacerbation (Primary)  [J96.21, J96.22] Acute on chronic respiratory failure with hypoxia and hypercapnia        ED Disposition Condition    Admit                 Avelino Desai MD  Resident  10/09/23 3055       Nabeel Lau MD  10/11/23 0961

## 2023-10-24 ENCOUNTER — MEDICAL CORRESPONDENCE (OUTPATIENT)
Dept: HEALTH INFORMATION MANAGEMENT | Facility: CLINIC | Age: 33
End: 2023-10-24
Payer: COMMERCIAL

## 2024-05-14 ENCOUNTER — MYC REFILL (OUTPATIENT)
Dept: OBGYN | Facility: CLINIC | Age: 34
End: 2024-05-14
Payer: COMMERCIAL

## 2024-05-14 DIAGNOSIS — D35.2 PROLACTINOMA (H): ICD-10-CM

## 2024-05-14 RX ORDER — CABERGOLINE 0.5 MG/1
0.25 TABLET ORAL
Qty: 20 TABLET | Refills: 1 | Status: SHIPPED | OUTPATIENT
Start: 2024-05-16 | End: 2024-07-16

## 2024-05-14 NOTE — TELEPHONE ENCOUNTER
Last Written Prescription Date:  4/20/23  Last Fill Quantity: 20,  # refills: 1   Last office visit: 6/27/2023 with Dr Jean  Future Office Visit:  6/6/24 with Dr Rees        Requested Prescriptions   Pending Prescriptions Disp Refills    cabergoline (DOSTINEX) 0.5 MG tablet 20 tablet 1     Sig: Take 0.5 tablets (0.25 mg) by mouth twice a week       There is no refill protocol information for this order        Routing refill request to provider for review/approval because:  Drug not on the G refill protocol   Pt can have raad refill for upcoming appt    Thanks,     Darby, RN  [facility]

## 2024-05-15 ENCOUNTER — ANCILLARY PROCEDURE (OUTPATIENT)
Dept: GENERAL RADIOLOGY | Facility: CLINIC | Age: 34
End: 2024-05-15
Attending: NURSE PRACTITIONER
Payer: COMMERCIAL

## 2024-05-15 ENCOUNTER — ALLIED HEALTH/NURSE VISIT (OUTPATIENT)
Dept: FAMILY MEDICINE | Facility: CLINIC | Age: 34
End: 2024-05-15
Payer: COMMERCIAL

## 2024-05-15 ENCOUNTER — OFFICE VISIT (OUTPATIENT)
Dept: URGENT CARE | Facility: URGENT CARE | Age: 34
End: 2024-05-15
Payer: COMMERCIAL

## 2024-05-15 ENCOUNTER — NURSE TRIAGE (OUTPATIENT)
Dept: FAMILY MEDICINE | Facility: CLINIC | Age: 34
End: 2024-05-15

## 2024-05-15 VITALS
OXYGEN SATURATION: 97 % | SYSTOLIC BLOOD PRESSURE: 120 MMHG | TEMPERATURE: 99.5 F | BODY MASS INDEX: 34.17 KG/M2 | DIASTOLIC BLOOD PRESSURE: 80 MMHG | RESPIRATION RATE: 18 BRPM | WEIGHT: 163.5 LBS | HEART RATE: 93 BPM

## 2024-05-15 DIAGNOSIS — R06.02 SOB (SHORTNESS OF BREATH): ICD-10-CM

## 2024-05-15 DIAGNOSIS — J18.9 PNEUMONIA OF RIGHT LOWER LOBE DUE TO INFECTIOUS ORGANISM: Primary | ICD-10-CM

## 2024-05-15 DIAGNOSIS — R50.9 FEVER, UNSPECIFIED FEVER CAUSE: ICD-10-CM

## 2024-05-15 DIAGNOSIS — R07.0 THROAT PAIN: ICD-10-CM

## 2024-05-15 DIAGNOSIS — R05.9 COUGH: Primary | ICD-10-CM

## 2024-05-15 DIAGNOSIS — H61.23 BILATERAL IMPACTED CERUMEN: ICD-10-CM

## 2024-05-15 DIAGNOSIS — R05.1 ACUTE COUGH: ICD-10-CM

## 2024-05-15 PROBLEM — Z34.80 PRENATAL CARE, SUBSEQUENT PREGNANCY: Status: RESOLVED | Noted: 2022-07-06 | Resolved: 2024-05-15

## 2024-05-15 LAB
DEPRECATED S PYO AG THROAT QL EIA: NEGATIVE
GROUP A STREP BY PCR: NOT DETECTED

## 2024-05-15 PROCEDURE — 99214 OFFICE O/P EST MOD 30 MIN: CPT | Mod: 25 | Performed by: NURSE PRACTITIONER

## 2024-05-15 PROCEDURE — 87651 STREP A DNA AMP PROBE: CPT | Performed by: NURSE PRACTITIONER

## 2024-05-15 PROCEDURE — 69210 REMOVE IMPACTED EAR WAX UNI: CPT | Performed by: NURSE PRACTITIONER

## 2024-05-15 PROCEDURE — 99207 PR NO CHARGE NURSE ONLY: CPT

## 2024-05-15 PROCEDURE — 71046 X-RAY EXAM CHEST 2 VIEWS: CPT | Mod: TC | Performed by: RADIOLOGY

## 2024-05-15 RX ORDER — AZITHROMYCIN 250 MG/1
TABLET, FILM COATED ORAL
Qty: 6 TABLET | Refills: 0 | Status: SHIPPED | OUTPATIENT
Start: 2024-05-15 | End: 2024-05-20

## 2024-05-15 RX ORDER — DOXYCYCLINE HYCLATE 100 MG
100 TABLET ORAL 2 TIMES DAILY
Qty: 10 TABLET | Refills: 0 | Status: SHIPPED | OUTPATIENT
Start: 2024-05-15 | End: 2024-05-20

## 2024-05-15 NOTE — PROGRESS NOTES
Assessment & Plan      Diagnosis Comments   1. Pneumonia of right lower lobe due to infectious organism  azithromycin (ZITHROMAX) 250 MG tablet, doxycycline hyclate (VIBRA-TABS) 100 MG tablet       2. Acute cough  XR Chest 2 Views       3. SOB (shortness of breath)  XR Chest 2 Views       4. Fever, unspecified fever cause  XR Chest 2 Views, Streptococcus A Rapid Screen w/Reflex to PCR - Clinic Collect, Group A Streptococcus PCR Throat Swab       5. Throat pain  Streptococcus A Rapid Screen w/Reflex to PCR - Clinic Collect, Group A Streptococcus PCR Throat Swab       6. Bilateral impacted cerumen  REMOVE IMPACTED CERUMEN       Pending strep culture  Chest x-ray indicated right sided pneumonia likely and a granuloma versus pulmonary nodular approximately 6 mm in size patient will follow-up with her primary care provider for further imaging related to this.  Handout given from epic and reviewed related to pneumonia treatment for home care conservative therapies.  Along with red flag symptoms reviewed that would warrant emergent or urgent evaluation patient verbalized understanding was agreement this plan.    LIZBETH Salazar [facility] [place] ALLYSON Moul[place] is a 33 year old female who presents to clinic today for the following health issues:  Chief Complaint   Patient presents with    Urgent Care    Shortness of Breath     X few days     Cough     X a week     HPI    Patient presents to clinic with symptoms of cough ongoing for about 2 weeks with SOB 2 days. Cough is productive at times. Low grade fever in clinic denies at home, states throat pain. 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       Review of Systems  Constitutional, HEENT, cardiovascular, pulmonary, gi and gu systems are negative, except as otherwise noted.      Objective    /80 (BP Location: Left arm, Patient Position: Sitting, Cuff Size: Adult Regular)   Pulse 93   Temp 99.5  F (37.5  C) (Tympanic)   Wt 74.2  kg (163 lb 8 oz)   SpO2 97%   BMI 34.17 kg/m    Physical Exam   GENERAL: alert, no distress, and fatigued  EYES: Eyes grossly normal to inspection, PERRL and conjunctivae and sclerae normal  HENT: normal cephalic/atraumatic, impacted cerumen bilateral ear lavage completed by MA reevaluated by provider with ear canals and TM's normal, nose and mouth without ulcers or lesions, nasal mucosa edematous , rhinorrhea clear, oropharynx clear, oral mucous membranes moist, and tonsillar erythema  NECK: bilateral anterior cervical adenopathy, no asymmetry, masses, or scars, and thyroid normal to palpation  RESP: decreased breath sounds bibasilar  CV: regular rate and rhythm, normal S1 S2, no S3 or S4, no murmur, click or rub, no peripheral edema  ABDOMEN: soft, nontender, no hepatosplenomegaly, no masses and bowel sounds normal  MS: no gross musculoskeletal defects noted, no edema  SKIN: no suspicious lesions or rashes    Results for orders placed or performed in visit on 05/15/24   XR Chest 2 Views     Status: None    Narrative    CHEST TWO VIEWS 5/15/2024 1:52 PM     HISTORY: Acute cough; SOB (shortness of breath); Fever, unspecified  fever cause    COMPARISON: None.       Impression    IMPRESSION: Patchy airspace opacities are seen within the right lower  lung concerning for pneumonia. 6 mm nodule is also noted in the right  mid lung that may reflect a pulmonary nodule or calcified granuloma.  Recommend further characterization with CT chest follow-up exam.    Left lung is clear. Normal cardiomediastinal silhouette. No acute bony  malleus present.    MAURICE GUSMAN MD         SYSTEM ID:  [id number]   Streptococcus A Rapid Screen w/Reflex to PCR - Clinic Collect     Status: Normal    Specimen: Throat; Swab   Result Value Ref Range    Group A Strep antigen Negative Negative

## 2024-05-15 NOTE — TELEPHONE ENCOUNTER
Pt walked into clinic c/o SOB. She states that she started with a cough on Monday and then on Tuesday, difficulty breathing started. She is not in distress, sitting comfortably in a chair during the conversation and able to speak full sentences. She reports this has been ongoing since it started; no wheezing or distress upon exam. No reported fevers. She was triaged in clinic; writer advised she go to Urgent Care. PCP not through  and no openings today. She is going to drive to .    Reason for Disposition   MILD difficulty breathing (e.g., minimal/no SOB at rest, SOB with walking, pulse < 100) of new-onset or worse than normal    Additional Information   Negative: SEVERE difficulty breathing (e.g., struggling for each breath, speaks in single words, pulse > 120)   Negative: Breathing stopped and hasn't returned   Negative: Choking on something   Negative: Bluish (or gray) lips or face   Negative: Difficult to awaken or acting confused (e.g., disoriented, slurred speech)   Negative: Passed out (i.e., fainted, collapsed and was not responding)   Negative: Wheezing started suddenly after medicine, an allergic food, or bee sting   Negative: Stridor (harsh sound while breathing in)   Negative: Slow, shallow and weak breathing   Negative: Sounds like a life-threatening emergency to the triager   Negative: Chest pain   Negative: Wheezing (high pitched whistling sound) and previous asthma attacks or use of asthma medicines   Negative: Difficulty breathing and within 14 days of COVID-19 Exposure   Negative: Difficulty breathing and only present when coughing   Negative: Difficulty breathing and only from stuffy nose   Negative: Difficulty breathing and only from stuffy nose or runny nose from common cold   Negative: MODERATE difficulty breathing (e.g., speaks in phrases, SOB even at rest, pulse 100-120) of new-onset or worse than normal   Negative: Oxygen level (e.g., pulse oximetry) 90% or lower   Negative: Wheezing can  "be heard across the room   Negative: Drooling or spitting out saliva (because can't swallow)   Negative: Any history of prior \"blood clot\" in leg or lungs   Negative: Illness requiring prolonged bedrest in past month (e.g., immobilization, long hospital stay)   Negative: Hip or leg fracture (broken bone) in past month (or had cast on leg or ankle in past month)   Negative: Major surgery in the past month   Negative: Long-distance travel in past month (e.g., car, bus, train, plane; with trip lasting 6 or more hours)   Negative: Cancer treatment in past six months (or has cancer now)   Negative: Extra heartbeats, irregular heart beating, or heart is beating very fast (i.e., 'palpitations')   Negative: Fever > 103 F (39.4 C)   Negative: Fever > 101 F (38.3 C) and over 60 years of age   Negative: Fever > 100.0 F (37.8 C) and bedridden (e.g., nursing home patient, stroke, chronic illness, recovering from surgery)   Negative: Fever > 100.0 F (37.8 C) and diabetes mellitus or weak immune system (e.g., HIV positive, cancer chemo, splenectomy, organ transplant, chronic steroids)   Negative: Periods where breathing stops and then resumes normally and bedridden (e.g., nursing home patient, CVA)   Negative: Pregnant or postpartum (from 0 to 6 weeks after delivery)   Negative: Patient sounds very sick or weak to the triager    Answer Assessment - Initial Assessment Questions  1. RESPIRATORY STATUS: \"Describe your breathing?\" (e.g., wheezing, shortness of breath, unable to speak, severe coughing)       Shortness of breath   2. ONSET: \"When did this breathing problem begin?\"       Monday it started with ah cough  3. PATTERN \"Does the difficult breathing come and go, or has it been constant since it started?\"       Comes and goes  4. SEVERITY: \"How bad is your breathing?\" (e.g., mild, moderate, severe)     - MILD: No SOB at rest, mild SOB with walking, speaks normally in sentences, can lie down, no retractions, pulse < 100.     - " "MODERATE: SOB at rest, SOB with minimal exertion and prefers to sit, cannot lie down flat, speaks in phrases, mild retractions, audible wheezing, pulse 100-120.     - SEVERE: Very SOB at rest, speaks in single words, struggling to breathe, sitting hunched forward, retractions, pulse > 120       Mild - able to speak normally in full sentences   5. RECURRENT SYMPTOM: \"Have you had difficulty breathing before?\" If Yes, ask: \"When was the last time?\" and \"What happened that time?\"       No   6. CARDIAC HISTORY: \"Do you have any history of heart disease?\" (e.g., heart attack, angina, bypass surgery, angioplasty)       No   7. LUNG HISTORY: \"Do you have any history of lung disease?\"  (e.g., pulmonary embolus, asthma, emphysema)      No  8. CAUSE: \"What do you think is causing the breathing problem?\"       Unsure   9. OTHER SYMPTOMS: \"Do you have any other symptoms? (e.g., dizziness, runny nose, cough, chest pain, fever)      Cough, runny nose, no fevers   10. O2 SATURATION MONITOR:  \"Do you use an oxygen saturation monitor (pulse oximeter) at home?\" If Yes, ask: \"What is your reading (oxygen level) today?\" \"What is your usual oxygen saturation reading?\" (e.g., 95%)        No  11. PREGNANCY: \"Is there any chance you are pregnant?\" \"When was your last menstrual period?\"        N/A  12. TRAVEL: \"Have you traveled out of the country in the last month?\" (e.g., travel history, exposures)        No    Protocols used: Breathing Difficulty-A-[place]    JULIAN BayN BANDAR  [facility]  "

## 2024-05-15 NOTE — NURSING NOTE
Patient identified using two patient identifiers.  Ear exam showing wax occlusion completed by provider.  Solution: warm water was placed in the bilateral ear(s) via irrigation tool: elephant ear.      Nat Roth RN BSN  [facility]

## 2024-05-15 NOTE — PATIENT INSTRUCTIONS
Recommend close follow up with your primary care provider related to possible lung nodule vs. Granuloma radiologist recommending CT chest to further evaluate.

## 2024-06-06 ENCOUNTER — OFFICE VISIT (OUTPATIENT)
Dept: OBGYN | Facility: CLINIC | Age: 34
End: 2024-06-06
Payer: COMMERCIAL

## 2024-06-06 VITALS
BODY MASS INDEX: 34.28 KG/M2 | DIASTOLIC BLOOD PRESSURE: 76 MMHG | TEMPERATURE: 97.8 F | SYSTOLIC BLOOD PRESSURE: 110 MMHG | WEIGHT: 164 LBS | HEART RATE: 84 BPM

## 2024-06-06 DIAGNOSIS — N93.9 ABNORMAL UTERINE BLEEDING (AUB): Primary | ICD-10-CM

## 2024-06-06 LAB — TSH SERPL DL<=0.005 MIU/L-ACNC: 1.74 UIU/ML (ref 0.3–4.2)

## 2024-06-06 PROCEDURE — 83498 ASY HYDROXYPROGESTERONE 17-D: CPT | Performed by: OBSTETRICS & GYNECOLOGY

## 2024-06-06 PROCEDURE — 84403 ASSAY OF TOTAL TESTOSTERONE: CPT | Performed by: OBSTETRICS & GYNECOLOGY

## 2024-06-06 PROCEDURE — 84443 ASSAY THYROID STIM HORMONE: CPT | Performed by: OBSTETRICS & GYNECOLOGY

## 2024-06-06 PROCEDURE — 36415 COLL VENOUS BLD VENIPUNCTURE: CPT | Performed by: OBSTETRICS & GYNECOLOGY

## 2024-06-06 PROCEDURE — 99214 OFFICE O/P EST MOD 30 MIN: CPT | Performed by: OBSTETRICS & GYNECOLOGY

## 2024-06-06 PROCEDURE — 82670 ASSAY OF TOTAL ESTRADIOL: CPT | Performed by: OBSTETRICS & GYNECOLOGY

## 2024-06-06 PROCEDURE — 84270 ASSAY OF SEX HORMONE GLOBUL: CPT | Performed by: OBSTETRICS & GYNECOLOGY

## 2024-06-06 PROCEDURE — 83001 ASSAY OF GONADOTROPIN (FSH): CPT | Performed by: OBSTETRICS & GYNECOLOGY

## 2024-06-06 PROCEDURE — 82627 DEHYDROEPIANDROSTERONE: CPT | Performed by: OBSTETRICS & GYNECOLOGY

## 2024-06-06 PROCEDURE — 84146 ASSAY OF PROLACTIN: CPT | Performed by: OBSTETRICS & GYNECOLOGY

## 2024-06-06 NOTE — NURSING NOTE
"Initial /76 (BP Location: Right arm, Patient Position: Chair, Cuff Size: Adult Regular)   Pulse 84   Temp 97.8  F (36.6  C) (Tympanic)   Wt 74.4 kg (164 lb)   LMP 04/30/2024 (Approximate)   BMI 34.28 kg/m   Estimated body mass index is 34.28 kg/m  as calculated from the following:    Height as of 6/27/23: 1.473 m (4' 10\").    Weight as of this encounter: 74.4 kg (164 lb). .      Addis Borges MA    "

## 2024-06-06 NOTE — PROGRESS NOTES
Gynecology Consult Note      HPI: Anu Davis is a 33 year old  who presents for AUB.  States she has a long history of abnormal bleeding.  She states that most recently she has had menses up to a couple of months apart.  When she does get a period is very heavy.  She states that she has been soaking through large pads quite quickly.  Note that if she misses her prolactin medicine for even a short period of time she begins to have nipple discharge.  Does not have any currently as she but has been using her medication.  Not sure about future childbearing.  Does not complain of dizziness or lightheadedness.  Otherwise feeling well.    ROS: 10 pt ROS neg other than HPI    PMH:   Past Medical History:   Diagnosis Date    Chickenpox     Ganglion of joint 2011    Tenosynovitis        PSHx:   Past Surgical History:   Procedure Laterality Date     SECTION N/A 2018    Procedure:  SECTION;;  Surgeon: Wang Pelletier MD;  Location: [facility]     SECTION N/A 3/8/2023    Procedure:  SECTION;  Surgeon: Desiree Rees MD;  Location: [facility] OR    MOUTH SURGERY      wisdom teeth    WRIST SURGERY Right     ganglion removal       Medications:   Current Outpatient Medications   Medication Sig Dispense Refill    cabergoline (DOSTINEX) 0.5 MG tablet Take 0.5 tablets (0.25 mg) by mouth twice a week 20 tablet 1    clobetasol propionate (CLOBEX) 0.05 % external shampoo Use 1-2 times weekly 118 mL 4    ferrous sulfate (FEROSUL) 325 (65 Fe) MG tablet Take 1 tablet (325 mg) by mouth daily (with breakfast) 90 tablet 1    fluocinonide (LIDEX) 0.05 % external solution Apply to scalp BID x 1-2 weeks PRN 50 mL 3    ketoconazole (NIZORAL) 2 % external shampoo Use daily as needed 120 mL 11    triamcinolone (KENALOG) 0.1 % external cream Apply to ears twice per day as needed 30 g 2     No current facility-administered medications for this visit.        Allergies:      Allergies    Allergen Reactions    Bees Itching and Swelling       Social History:   Social History     Socioeconomic History    Marital status:      Spouse name: Not on file    Number of children: Not on file    Years of education: Not on file    Highest education level: Not on file   Occupational History    Not on file   Tobacco Use    Smoking status: Never    Smokeless tobacco: Never   Vaping Use    Vaping status: Never Used   Substance and Sexual Activity    Alcohol use: Not Currently     Comment: rare-quit with pregnancy    Drug use: No    Sexual activity: Yes     Partners: Male     Birth control/protection: Condom     Comment:  since 2016   Other Topics Concern    Parent/sibling w/ CABG, MI or angioplasty before 65F 55M? Not Asked   Social History Narrative     for grades 2-5 in [place]    Daughter born approx 4/2018     Social Determinants of Health     Financial Resource Strain: Not on file   Food Insecurity: Not on file   Transportation Needs: Not on file   Physical Activity: Not on file   Stress: Not on file   Social Connections: Not on file   Interpersonal Safety: Not on file   Housing Stability: Not on file       Family History:  Family History   Problem Relation Age of Onset    Fibroids Mother     Depression Mother         situational    Esophageal varices Mother     Hypertension Father     Diverticulitis Father     Colon Polyps Father     Emphysema Paternal Grandmother     Other - See Comments Paternal Grandmother         MVA       Physical Exam:   Vitals:    06/06/24 0833   BP: 110/76   BP Location: Right arm   Patient Position: Chair   Cuff Size: Adult Regular   Pulse: 84   Temp: 97.8  F (36.6  C)   TempSrc: Tympanic   Weight: 74.4 kg (164 lb)      Gen: lying in bed, NAD  CV: Reg rate, well perfused  Pulm: no increased work of breathing  Abd: non-tender, non-distended, no masses   Pelvis: normal appearing external genitalia, vaginal mucosa, cervix, bimanual exam with  normal size and contour of uterus with no adnexal masses  Extremities: non-tender, no erythema; no edema  Psych: normal mood and affect  Neuro: no focal deficits        A&P: Anu Davis is a 33 year old P2 who presents for abnormal bleeding.  This has been ongoing for several years but has been worse in the last few months.  She states that her menses have been quite irregular, occurring at a couple of months apart.  Notes when she does have it she has quite heavy bleeding.  Discussed with patient would recommend a pelvic ultrasound to rule out structural causes for her abnormal bleeding.  Also recommended laboratory studies including prolactin to confirm that prolactinoma is not impacting her bleeding.  Discussed with patient at length options for medical management.  Discussed progesterone only options such as IUD and Nexplanon.  Reviewed insertion procedures and expected bleeding patterns.  Also discussed estrogen-containing contraception including pill, patch, ring.  Discussed the expected bleeding pattern with these.  After discussion patient is not sure how she would like to proceed with regard to medical management.  Well she is considering we will obtain the pelvic ultrasound and laboratory studies and follow-up after completion to determine plan of care.  Discussed with patient that if there is ongoing concern about her prolactinoma would defer to endocrinology for further management questions.  Patient states understanding, all questions answered.    I spent 30 minutes reviewing chart, obtaining history, counseling, examining, coordinating care, documenting this encounter.    Desiree Rees MD   6/6/2024 9:09 AM

## 2024-06-07 LAB
DHEA-S SERPL-MCNC: 354 UG/DL (ref 35–430)
ESTRADIOL SERPL-MCNC: 72 PG/ML
FSH SERPL IRP2-ACNC: 5.2 MIU/ML
PROLACTIN SERPL 3RD IS-MCNC: 23 NG/ML (ref 5–23)
SHBG SERPL-SCNC: 36 NMOL/L (ref 30–135)

## 2024-06-08 ENCOUNTER — HOSPITAL ENCOUNTER (OUTPATIENT)
Dept: ULTRASOUND IMAGING | Facility: CLINIC | Age: 34
Discharge: HOME | End: 2024-06-08
Attending: OBSTETRICS & GYNECOLOGY | Admitting: OBSTETRICS & GYNECOLOGY
Payer: COMMERCIAL

## 2024-06-08 DIAGNOSIS — N93.9 ABNORMAL UTERINE BLEEDING (AUB): ICD-10-CM

## 2024-06-08 PROCEDURE — 76856 US EXAM PELVIC COMPLETE: CPT

## 2024-06-08 PROCEDURE — 76830 TRANSVAGINAL US NON-OB: CPT

## 2024-06-09 LAB
TESTOST FREE SERPL-MCNC: 0.61 NG/DL
TESTOST SERPL-MCNC: 36 NG/DL (ref 8–60)

## 2024-06-13 LAB — 17OHP SERPL-MCNC: 40 NG/DL

## 2024-07-14 ENCOUNTER — HEALTH MAINTENANCE LETTER (OUTPATIENT)
Age: 34
End: 2024-07-14

## 2024-07-16 ENCOUNTER — VIRTUAL VISIT (OUTPATIENT)
Dept: OBGYN | Facility: CLINIC | Age: 34
End: 2024-07-16
Payer: COMMERCIAL

## 2024-07-16 DIAGNOSIS — N92.6 IRREGULAR MENSES: ICD-10-CM

## 2024-07-16 PROCEDURE — 99213 OFFICE O/P EST LOW 20 MIN: CPT | Mod: 95 | Performed by: OBSTETRICS & GYNECOLOGY

## 2024-07-16 RX ORDER — DROSPIRENONE AND ETHINYL ESTRADIOL 0.02-3(28)
1 KIT ORAL DAILY
Qty: 84 TABLET | Refills: 5 | Status: SHIPPED | OUTPATIENT
Start: 2024-07-16

## 2024-07-16 NOTE — PROGRESS NOTES
Saige is a 33 year old who is being evaluated via a billable video visit.    How would you like to obtain your AVS? MyChart  If the video visit is dropped, the invitation should be resent by: Text to cell phone: [phone]  Will anyone else be joining your video visit? No      Subjective   Saige is a 33 year old, presenting for the following health issues:  Follow Up (Discuss medication and birth control)  [facility]  OB/GYN Clinic   Gynecology Consult Note    CC:     Chief Complaint   Patient presents with    Follow Up     Discuss medication and birth control       HPI: Ms. Davis is a 33 year old  being seen for GYN consultation for management of cabergoline.   Had a hx of irregular cycles, elevated prolactin. MRI normal, so microprolactinoma. Has heavy cycles now, but regular. Had made a plan with Dr. Rees to start Anuradha. Hoping to stop the cabergoline if the Anuradha seems to control her symptoms.   Had blood work in  this year with normal prolactin on the cabergoline.   Has nipple discharge if she doesn't take it though. Leaks with intimacy.     ROS: A 10 pt ROS was completed and found to be otherwise negative unless mentioned in the HPI.     PMH:   Past Medical History:   Diagnosis Date    Chickenpox     Ganglion of joint 2011    Tenosynovitis        PSHx:   Past Surgical History:   Procedure Laterality Date     SECTION N/A 2018    Procedure:  SECTION;;  Surgeon: Wang Pelletier MD;  Location: [facility] OR     SECTION N/A 3/8/2023    Procedure:  SECTION;  Surgeon: Desiree Rees MD;  Location: [facility] OR    MOUTH SURGERY      wisdom teeth    WRIST SURGERY Right     ganglion removal       OBHx:   OB History    Para Term  AB Living   3 2 2 0 1 2   SAB IAB Ectopic Multiple Live Births   1 0 0 0 2      # Outcome Date GA Lbr Talib/2nd Weight Sex Type Anes PTL Lv   3 Term 23 39w2d  3.544 kg (7 lb 13 oz) M  CS-LTranv Spinal N GENE      Birth Comments: PNP in attendance for repeat . Infant placed on sterile drape for delayed cord clamping. Cried immediately. Brought to warmer and dried/stimulated. Apgars 9/9.       Name: Noel      Apgar1: 9  Apgar5: 9   2 SAB 10/01/21 8w0d    AB, MISSED      1 Term 18 40w3d 12:45 / 04:16 3.07 kg (6 lb 12.3 oz) F CS-LTranv EPI N GENE      Birth Comments: NP called to the delivery for arrest of decent and fetal tachycardia.  After some pushing it was decided by the OBGYN to go to  secondary to arrest of decent.  NP scrubbed in and assisted in the .  Infant delivered with a nuchal cord x1.  Infant cried right away after delivery.  Infant brought to the warmer by the RN for assessment and measurements. Apgars were 9/9.  No further intervention required.  Infant to mother for skin to skin and bonding.        Name: Christine      Apgar1: 9  Apgar5: 9       Medications:   Current Outpatient Medications   Medication Sig Dispense Refill    cabergoline (DOSTINEX) 0.5 MG tablet Take 0.5 tablets (0.25 mg) by mouth twice a week 20 tablet 1    clobetasol propionate (CLOBEX) 0.05 % external shampoo Use 1-2 times weekly 118 mL 4    ferrous sulfate (FEROSUL) 325 (65 Fe) MG tablet Take 1 tablet (325 mg) by mouth daily (with breakfast) 90 tablet 1    fluocinonide (LIDEX) 0.05 % external solution Apply to scalp BID x 1-2 weeks PRN 50 mL 3    ketoconazole (NIZORAL) 2 % external shampoo Use daily as needed 120 mL 11    triamcinolone (KENALOG) 0.1 % external cream Apply to ears twice per day as needed 30 g 2     No current facility-administered medications for this visit.       Allergies:      Allergies   Allergen Reactions    Bees Itching and Swelling       Social History:   Social History     Socioeconomic History    Marital status:      Spouse name: Not on file    Number of children: Not on file    Years of education: Not on file    Highest education level: Not on  file   Occupational History    Not on file   Tobacco Use    Smoking status: Never    Smokeless tobacco: Never   Vaping Use    Vaping status: Never Used   Substance and Sexual Activity    Alcohol use: Not Currently     Comment: rare-quit with pregnancy    Drug use: No    Sexual activity: Yes     Partners: Male     Birth control/protection: Condom     Comment:  since 2016   Other Topics Concern    Parent/sibling w/ CABG, MI or angioplasty before 65F 55M? Not Asked   Social History Narrative     for grades 2-5 in [place]    Daughter born approx 4/2018     Social Determinants of Health     Financial Resource Strain: Not on file   Food Insecurity: Not on file   Transportation Needs: Not on file   Physical Activity: Not on file   Stress: Not on file   Social Connections: Not on file   Interpersonal Safety: Not on file   Housing Stability: Not on file     Social History     Socioeconomic History    Marital status:      Spouse name: None    Number of children: None    Years of education: None    Highest education level: None   Tobacco Use    Smoking status: Never    Smokeless tobacco: Never   Vaping Use    Vaping status: Never Used   Substance and Sexual Activity    Alcohol use: Not Currently     Comment: rare-quit with pregnancy    Drug use: No    Sexual activity: Yes     Partners: Male     Birth control/protection: Condom     Comment:  since 2016   Social History Narrative     for grades 2-5 in [place]    Daughter born approx 4/2018       Family History:   Family History   Problem Relation Age of Onset    Fibroids Mother     Depression Mother         situational    Esophageal varices Mother     Hypertension Father     Diverticulitis Father     Colon Polyps Father     Emphysema Paternal Grandmother     Other - See Comments Paternal Grandmother         MVA       Physical Exam:   No VS due to video visit  Gen: Pleasant, talkative female in no apparent  distress   Respiratory: breathing comfortably on room air     Labs:   Component      Latest Ref Rng 2024  9:16 AM   Free Testosterone Calculated      ng/dL 0.61    Testosterone Total      8 - 60 ng/dL 36    Estradiol      pg/mL 72    FSH      mIU/mL 5.2    Prolactin      5 - 23 ng/mL 23    TSH      0.30 - 4.20 uIU/mL 1.74    17-OH Progesterone      <=630 ng/dL 40    DHEA Sulfate      35 - 430 ug/dL 354    Sex Hormone Binding Globulin      30 - 135 nmol/L 36      Imaging:   EXAM: US PELVIC TRANSABDOMINAL AND TRANSVAGINAL  LOCATION: [facility]  DATE: 2024     INDICATION: Abnormal uterine bleeding.  COMPARISON: Pelvic ultrasound on 3/12/2019.  TECHNIQUE: Transabdominal scans were performed. Endovaginal ultrasound was performed to better visualize the adnexa.     FINDINGS:     UTERUS: Measures 8.3 x 4.6 x 4.0 cm. Normal in size and position with no masses.     ENDOMETRIUM: 13 mm. Normal smooth endometrium.     RIGHT OVARY: Measures 4.3 x 4.4 x 3.0 cm. Normal.     LEFT OVARY: Measures 1.6 x 1.8 x 3.1 cm. Normal.     Trace amount of free fluid in the pelvis, indeterminate, could be physiological.                                                                      IMPRESSION:  1.  The endometrial stripe measures 13 mm in thickness.  2.  Ovaries are visualized and appears unremarkable. No suspicious adnexal masses.    A&P: Ms. Davis is a 33 year old  being seen for GYN consultation for management of cabergoline and COCPs for AUB>   Discussed option of stopping cabergoline and seeing if she experiences irregular cycles or nipple leakage. If ok, no need to use this.   Wants me to resend Anuradha for AUB, cycle regulation and birth control. Has tolerated this well in the past. 1 years worth sent. Will MyChart if microprolactinoma symptoms return for refill of cabergoline.     Jinny Currie MD  OB/GYN  2024                   Video-Visit Details    Type of service:  Video  Visit   Originating Location (pt. Location): Home    Distant Location (provider location):  On-site  Platform used for Video Visit: David  Signed Electronically by: Jinny Currie MD

## 2024-11-07 ENCOUNTER — OFFICE VISIT (OUTPATIENT)
Dept: FAMILY MEDICINE | Facility: CLINIC | Age: 34
End: 2024-11-07
Payer: COMMERCIAL

## 2024-11-07 VITALS
WEIGHT: 161 LBS | SYSTOLIC BLOOD PRESSURE: 112 MMHG | HEIGHT: 57 IN | BODY MASS INDEX: 34.73 KG/M2 | DIASTOLIC BLOOD PRESSURE: 72 MMHG | RESPIRATION RATE: 18 BRPM | TEMPERATURE: 97.2 F | OXYGEN SATURATION: 100 % | HEART RATE: 92 BPM

## 2024-11-07 DIAGNOSIS — M62.81 GENERALIZED MUSCLE WEAKNESS: Primary | ICD-10-CM

## 2024-11-07 LAB
ANION GAP SERPL CALCULATED.3IONS-SCNC: 13 MMOL/L (ref 7–15)
BASOPHILS # BLD AUTO: 0.1 10E3/UL (ref 0–0.2)
BASOPHILS NFR BLD AUTO: 1 %
BUN SERPL-MCNC: 14.1 MG/DL (ref 6–20)
CALCIUM SERPL-MCNC: 9.7 MG/DL (ref 8.8–10.4)
CHLORIDE SERPL-SCNC: 103 MMOL/L (ref 98–107)
CREAT SERPL-MCNC: 0.73 MG/DL (ref 0.51–0.95)
EGFRCR SERPLBLD CKD-EPI 2021: >90 ML/MIN/1.73M2
EOSINOPHIL # BLD AUTO: 0.1 10E3/UL (ref 0–0.7)
EOSINOPHIL NFR BLD AUTO: 1 %
ERYTHROCYTE [DISTWIDTH] IN BLOOD BY AUTOMATED COUNT: 12.4 % (ref 10–15)
GLUCOSE SERPL-MCNC: 93 MG/DL (ref 70–99)
HCG SERPL QL: NEGATIVE
HCO3 SERPL-SCNC: 24 MMOL/L (ref 22–29)
HCT VFR BLD AUTO: 39.2 % (ref 35–47)
HGB BLD-MCNC: 13 G/DL (ref 11.7–15.7)
IMM GRANULOCYTES # BLD: 0 10E3/UL
IMM GRANULOCYTES NFR BLD: 0 %
LYMPHOCYTES # BLD AUTO: 1.9 10E3/UL (ref 0.8–5.3)
LYMPHOCYTES NFR BLD AUTO: 21 %
MCH RBC QN AUTO: 28.8 PG (ref 26.5–33)
MCHC RBC AUTO-ENTMCNC: 33.2 G/DL (ref 31.5–36.5)
MCV RBC AUTO: 87 FL (ref 78–100)
MONOCYTES # BLD AUTO: 0.6 10E3/UL (ref 0–1.3)
MONOCYTES NFR BLD AUTO: 7 %
NEUTROPHILS # BLD AUTO: 6.3 10E3/UL (ref 1.6–8.3)
NEUTROPHILS NFR BLD AUTO: 70 %
PLATELET # BLD AUTO: 323 10E3/UL (ref 150–450)
POTASSIUM SERPL-SCNC: 4.3 MMOL/L (ref 3.4–5.3)
RBC # BLD AUTO: 4.52 10E6/UL (ref 3.8–5.2)
SODIUM SERPL-SCNC: 140 MMOL/L (ref 135–145)
WBC # BLD AUTO: 9 10E3/UL (ref 4–11)

## 2024-11-07 PROCEDURE — 99213 OFFICE O/P EST LOW 20 MIN: CPT | Performed by: PHYSICIAN ASSISTANT

## 2024-11-07 PROCEDURE — 85025 COMPLETE CBC W/AUTO DIFF WBC: CPT | Performed by: PHYSICIAN ASSISTANT

## 2024-11-07 PROCEDURE — 36415 COLL VENOUS BLD VENIPUNCTURE: CPT | Performed by: PHYSICIAN ASSISTANT

## 2024-11-07 PROCEDURE — 80048 BASIC METABOLIC PNL TOTAL CA: CPT | Performed by: PHYSICIAN ASSISTANT

## 2024-11-07 PROCEDURE — G2211 COMPLEX E/M VISIT ADD ON: HCPCS | Performed by: PHYSICIAN ASSISTANT

## 2024-11-07 PROCEDURE — 84703 CHORIONIC GONADOTROPIN ASSAY: CPT | Performed by: PHYSICIAN ASSISTANT

## 2024-11-07 ASSESSMENT — PAIN SCALES - GENERAL: PAINLEVEL_OUTOF10: MILD PAIN (2)

## 2024-11-07 NOTE — PATIENT INSTRUCTIONS
Cam Moulton,    Thank you for allowing [facility] to manage your care.    I am unsure of the cause of your symptoms, but your exam is reassuring. We will see what our workup shows.     If you develop worsening/changing symptoms at any time, please be seen in clinic/urgent care or call 911/go to the emergency department for evaluation.    I ordered some lab work. Please go to the laboratory to get your studies.    Please allow 1-2 business days for our office to contact you in regards to your laboratory/radiological studies.  If not done so, I encourage you to login into [employer] ([email or web address] to review your results as well.     Drink 8-10 glasses of fluid daily to stay well-hydrated.    If you have any questions or concerns, please feel free to call us at [phone]    Sincerely,    Mustapha Pagie PA-C    Did you know?      You can schedule a video visit for follow-up appointments as well as future appointments for certain conditions.  Please see the below link.     [email or web address]    If you have not already done so,  I encourage you to sign up for [employer] ([email or web address]).  This will allow you to review your results, securely communicate with a provider, and schedule virtual visits as well.

## 2024-11-07 NOTE — PROGRESS NOTES
"  Assessment & Plan   Problem List Items Addressed This Visit    None  Visit Diagnoses       Generalized muscle weakness    -  Primary    Relevant Orders    HCG qualitative, Blood ([id number]) (Completed)    CBC with platelets and differential (Completed)    Basic metabolic panel  (Ca, Cl, CO2, Creat, Gluc, K, Na, BUN) (Completed)           Impression is likely viral illness including COVID-19. Will do a home test and declined flu and COVID-19 PCR. Appears well and non-toxic and I have low suspicion for impending airway obstruction, CNS infection or respiratory distress at this point. Serum HCG neg. CBC and chem reassuring. She will push p.o. fluids, use over-the-counter meds for symptoms, and follow-up with us in 1-2 weeks if not improving or urgent care/the ER if symptoms worsen/change at any time.    Complete history and physical exam as below. Afebrile with normal vital signs.    DDx and Dx discussed with and explained to the pt to their satisfaction.  All questions were answered at this time. Pt expressed understanding of and agreement with this dx, tx, and plan. No further workup warranted and standard medication warnings given. I have given the patient a list of pertinent indications for re-evaluation. Will go to the Emergency Department if symptoms worsen or new concerning symptoms arise. Patient left in no apparent distress.        BMI  Estimated body mass index is 34.84 kg/m  as calculated from the following:    Height as of this encounter: 1.448 m (4' 9\").    Weight as of this encounter: 73 kg (161 lb).     See Patient Instructions      Moody Moulton is a 33 year old, presenting for the following health issues:  Sick        11/7/2024     8:44 AM   Additional Questions   Roomed by Héctor Celestin CMA   Accompanied by N/A         11/7/2024     8:44 AM   Patient Reported Additional Medications   Patient reports taking the following new medications No new medications     History of Present Illness "       Reason for visit:  Flushed cheeks , more thirsty, chills  Symptom onset:  3-7 days ago  Symptoms include:  Chills, flushed cheeks and increased thirst  Symptom intensity:  Moderate  Symptom progression:  Staying the same  Had these symptoms before:  No  What makes it worse:  Eating sometimes  What makes it better:  Drinking water or eating whole grains   She is taking medications regularly.     Patient is coming in today due to flushed cheeks, weakness, chills with no fever, increased appetite, and increased thirst which started Sunday.    She mentions that her family has been sick at home with a typical cold.    She does have some lower back pain today, similar to how it would be with a period.    She would also like to check her iron levels, she is not currently fasting.  - Symptoms started around Sunday  - Feeling weak, chills, flushed, hungry, and thirsty  - Eating whole grains and proteins seems to improve symptoms  - Cheeks have been constantly flushed, which is unusual for the patient  - No sore throat, just slightly dry  - No congestion or pain in cheeks  - No green snot or significant nasal discharge  - Feeling slightly nauseated but eating and drinking well  - Increased urination due to increased water intake  - No urgency or pain during urination  - No diarrhea  - No other skin changes    Past medical history  Low hemoglobin and iron levels in the past, around the time of childbirth    Past obstetric history  Has two children, aged 6 and 1.5 years    Family history  - Mother is adopted, no known family history beyond parents  - No known history of autoimmune illnesses in the family    Social history  Has two young children who attend     Current medications  Birth control      Review of Systems  Constitutional, HEENT, cardiovascular, pulmonary, gi and gu systems are negative, except as otherwise noted.      Objective    /72   Pulse 92   Temp 97.2  F (36.2  C) (Temporal)   Resp 18    "Ht 1.448 m (4' 9\")   Wt 73 kg (161 lb)   LMP 10/17/2024 (Within Days)   SpO2 100%   BMI 34.84 kg/m    Body mass index is 34.84 kg/m .  Physical Exam  Vitals and nursing note reviewed.   Constitutional:       General: She is not in acute distress.     Appearance: She is not ill-appearing or diaphoretic.   HENT:      Head: Normocephalic and atraumatic.      Right Ear: Tympanic membrane, ear canal and external ear normal.      Left Ear: Tympanic membrane, ear canal and external ear normal.      Mouth/Throat:      Mouth: Mucous membranes are moist.      Pharynx: Oropharynx is clear.      Comments: No trismus, voice abnormalities or asymmetry to the oropharynx.  Eyes:      Conjunctiva/sclera: Conjunctivae normal.   Cardiovascular:      Rate and Rhythm: Normal rate and regular rhythm.      Heart sounds: Normal heart sounds. No murmur heard.     No friction rub. No gallop.   Pulmonary:      Effort: Pulmonary effort is normal. No respiratory distress.      Breath sounds: Normal breath sounds. No stridor. No wheezing, rhonchi or rales.   Abdominal:      General: Bowel sounds are normal. There is no distension.      Palpations: Abdomen is soft. There is no mass.      Tenderness: There is no abdominal tenderness. There is no right CVA tenderness, left CVA tenderness, guarding or rebound.      Hernia: No hernia is present.   Musculoskeletal:      Cervical back: Normal range of motion and neck supple. No rigidity.   Lymphadenopathy:      Cervical: No cervical adenopathy.   Skin:     General: Skin is warm and dry.   Neurological:      General: No focal deficit present.      Mental Status: She is alert. Mental status is at baseline.   Psychiatric:         Mood and Affect: Mood normal.         Behavior: Behavior normal.          Results for orders placed or performed in visit on 11/07/24   HCG qualitative, Blood ([id number])     Status: Normal   Result Value Ref Range    hCG Serum Qualitative Negative Negative   Basic metabolic " panel  (Ca, Cl, CO2, Creat, Gluc, K, Na, BUN)     Status: Normal   Result Value Ref Range    Sodium 140 135 - 145 mmol/L    Potassium 4.3 3.4 - 5.3 mmol/L    Chloride 103 98 - 107 mmol/L    Carbon Dioxide (CO2) 24 22 - 29 mmol/L    Anion Gap 13 7 - 15 mmol/L    Urea Nitrogen 14.1 6.0 - 20.0 mg/dL    Creatinine 0.73 0.51 - 0.95 mg/dL    GFR Estimate >90 >60 mL/min/1.73m2    Calcium 9.7 8.8 - 10.4 mg/dL    Glucose 93 70 - 99 mg/dL   CBC with platelets and differential     Status: None   Result Value Ref Range    WBC Count 9.0 4.0 - 11.0 10e3/uL    RBC Count 4.52 3.80 - 5.20 10e6/uL    Hemoglobin 13.0 11.7 - 15.7 g/dL    Hematocrit 39.2 35.0 - 47.0 %    MCV 87 78 - 100 fL    MCH 28.8 26.5 - 33.0 pg    MCHC 33.2 31.5 - 36.5 g/dL    RDW 12.4 10.0 - 15.0 %    Platelet Count 323 150 - 450 10e3/uL    % Neutrophils 70 %    % Lymphocytes 21 %    % Monocytes 7 %    % Eosinophils 1 %    % Basophils 1 %    % Immature Granulocytes 0 %    Absolute Neutrophils 6.3 1.6 - 8.3 10e3/uL    Absolute Lymphocytes 1.9 0.8 - 5.3 10e3/uL    Absolute Monocytes 0.6 0.0 - 1.3 10e3/uL    Absolute Eosinophils 0.1 0.0 - 0.7 10e3/uL    Absolute Basophils 0.1 0.0 - 0.2 10e3/uL    Absolute Immature Granulocytes 0.0 <=0.4 10e3/uL   CBC with platelets and differential     Status: None    Narrative    The following orders were created for panel order CBC with platelets and differential.  Procedure                               Abnormality         Status                     ---------                               -----------         ------                     CBC with platelets and d...[[id number]]                      Final result                 Please view results for these tests on the individual orders.           Signed Electronically by: DEMETRI Hunt

## 2025-07-19 ENCOUNTER — HEALTH MAINTENANCE LETTER (OUTPATIENT)
Age: 35
End: 2025-07-19

## 2025-08-19 ENCOUNTER — MYC REFILL (OUTPATIENT)
Dept: OBGYN | Facility: CLINIC | Age: 35
End: 2025-08-19
Payer: COMMERCIAL

## 2025-08-19 DIAGNOSIS — N92.6 IRREGULAR MENSES: ICD-10-CM

## 2025-08-20 ENCOUNTER — OFFICE VISIT (OUTPATIENT)
Dept: OBGYN | Facility: CLINIC | Age: 35
End: 2025-08-20
Payer: COMMERCIAL

## 2025-08-20 VITALS
DIASTOLIC BLOOD PRESSURE: 75 MMHG | HEART RATE: 90 BPM | SYSTOLIC BLOOD PRESSURE: 130 MMHG | HEIGHT: 57 IN | BODY MASS INDEX: 34.52 KG/M2 | WEIGHT: 160 LBS

## 2025-08-20 DIAGNOSIS — Z13.220 SCREENING CHOLESTEROL LEVEL: ICD-10-CM

## 2025-08-20 DIAGNOSIS — D35.2 PROLACTINOMA (H): ICD-10-CM

## 2025-08-20 DIAGNOSIS — Z13.29 SCREENING FOR THYROID DISORDER: ICD-10-CM

## 2025-08-20 DIAGNOSIS — Z13.0 SCREENING, ANEMIA, DEFICIENCY, IRON: ICD-10-CM

## 2025-08-20 DIAGNOSIS — Z01.419 WOMEN'S ANNUAL ROUTINE GYNECOLOGICAL EXAMINATION: Primary | ICD-10-CM

## 2025-08-20 DIAGNOSIS — Z30.41 ENCOUNTER FOR SURVEILLANCE OF CONTRACEPTIVE PILLS: ICD-10-CM

## 2025-08-20 DIAGNOSIS — Z13.1 SCREENING FOR DIABETES MELLITUS: ICD-10-CM

## 2025-08-20 LAB
ANION GAP SERPL CALCULATED.3IONS-SCNC: 10 MMOL/L (ref 7–15)
BUN SERPL-MCNC: 11.6 MG/DL (ref 6–20)
CALCIUM SERPL-MCNC: 9.7 MG/DL (ref 8.8–10.4)
CHLORIDE SERPL-SCNC: 104 MMOL/L (ref 98–107)
CHOLEST SERPL-MCNC: 191 MG/DL
CREAT SERPL-MCNC: 0.68 MG/DL (ref 0.51–0.95)
EGFRCR SERPLBLD CKD-EPI 2021: >90 ML/MIN/1.73M2
ERYTHROCYTE [DISTWIDTH] IN BLOOD BY AUTOMATED COUNT: 12.6 % (ref 10–15)
EST. AVERAGE GLUCOSE BLD GHB EST-MCNC: 108 MG/DL
FASTING STATUS PATIENT QL REPORTED: NO
FASTING STATUS PATIENT QL REPORTED: NO
GLUCOSE SERPL-MCNC: 108 MG/DL (ref 70–99)
HBA1C MFR BLD: 5.4 % (ref 0–5.6)
HCO3 SERPL-SCNC: 27 MMOL/L (ref 22–29)
HCT VFR BLD AUTO: 38.4 % (ref 35–47)
HDLC SERPL-MCNC: 64 MG/DL
HGB BLD-MCNC: 12.3 G/DL (ref 11.7–15.7)
LDLC SERPL CALC-MCNC: 102 MG/DL
MCH RBC QN AUTO: 27.5 PG (ref 26.5–33)
MCHC RBC AUTO-ENTMCNC: 32 G/DL (ref 31.5–36.5)
MCV RBC AUTO: 85.9 FL (ref 78–100)
NONHDLC SERPL-MCNC: 127 MG/DL
PLATELET # BLD AUTO: 362 10E3/UL (ref 150–450)
POTASSIUM SERPL-SCNC: 4.2 MMOL/L (ref 3.4–5.3)
PROLACTIN SERPL 3RD IS-MCNC: 82 NG/ML (ref 5–23)
RBC # BLD AUTO: 4.47 10E6/UL (ref 3.8–5.2)
SODIUM SERPL-SCNC: 141 MMOL/L (ref 135–145)
TRIGL SERPL-MCNC: 126 MG/DL
TSH SERPL DL<=0.005 MIU/L-ACNC: 0.94 UIU/ML (ref 0.3–4.2)
WBC # BLD AUTO: 6.05 10E3/UL (ref 4–11)

## 2025-08-20 PROCEDURE — 36415 COLL VENOUS BLD VENIPUNCTURE: CPT | Performed by: PHYSICIAN ASSISTANT

## 2025-08-20 PROCEDURE — 84443 ASSAY THYROID STIM HORMONE: CPT | Performed by: PHYSICIAN ASSISTANT

## 2025-08-20 PROCEDURE — 83036 HEMOGLOBIN GLYCOSYLATED A1C: CPT | Performed by: PHYSICIAN ASSISTANT

## 2025-08-20 PROCEDURE — 80048 BASIC METABOLIC PNL TOTAL CA: CPT | Performed by: PHYSICIAN ASSISTANT

## 2025-08-20 PROCEDURE — 85027 COMPLETE CBC AUTOMATED: CPT | Performed by: PHYSICIAN ASSISTANT

## 2025-08-20 PROCEDURE — 84146 ASSAY OF PROLACTIN: CPT | Performed by: PHYSICIAN ASSISTANT

## 2025-08-20 PROCEDURE — 80061 LIPID PANEL: CPT | Performed by: PHYSICIAN ASSISTANT

## 2025-08-20 RX ORDER — DROSPIRENONE AND ETHINYL ESTRADIOL 0.02-3(28)
1 KIT ORAL DAILY
Qty: 84 TABLET | Refills: 3 | Status: SHIPPED | OUTPATIENT
Start: 2025-08-20

## 2025-08-21 RX ORDER — DROSPIRENONE AND ETHINYL ESTRADIOL 0.02-3(28)
1 KIT ORAL DAILY
Qty: 84 TABLET | Refills: 5 | OUTPATIENT
Start: 2025-08-21

## (undated) DEVICE — DRSG ABDOMINAL 07 1/2X8" 7197D

## (undated) DEVICE — GLOVE PROTEXIS W/NEU-THERA 7.0  2D73TE70

## (undated) DEVICE — SU VICRYL 0 CT-1 36" J946H

## (undated) DEVICE — SU PLAIN 3-0 CT 27" 852H

## (undated) DEVICE — LABEL MEDICATION SYSTEM  3304

## (undated) DEVICE — Device

## (undated) DEVICE — GLOVE PROTEXIS BLUE W/NEU-THERA 7.0  2D73EB70

## (undated) DEVICE — SU MONOCRYL 4-0 PS-2 18" UND Y496G

## (undated) DEVICE — PACK C-SECTION LF PL15OTA83B

## (undated) DEVICE — ESU PENCIL SMOKE EVAC W/ROCKER SWITCH 0703-047-000

## (undated) DEVICE — SU MONOCRYL 0 CT-1 27" Y340H

## (undated) DEVICE — SU VICRYL 2-0 CT-1 36" UND J945H

## (undated) DEVICE — GLOVE BIOGEL PI MICRO INDICATOR UNDERGLOVE SZ 6.5 48965

## (undated) DEVICE — BARRIER SEPRAFILM 5X6" SINGLE SHEET 4301-02

## (undated) DEVICE — DRAPE SHEET REV FOLD 3/4 9349

## (undated) DEVICE — SOL WATER IRRIG 1000ML BOTTLE 07139-09

## (undated) DEVICE — SOL NACL 0.9% IRRIG 1000ML BOTTLE 07138-09

## (undated) DEVICE — SU MONOCRYL 0 CT-1 36" T352H

## (undated) DEVICE — ADH FLOSEAL W/HUMAN THROMBIN 5ML

## (undated) DEVICE — PREP SKIN SCRUB TRAY 4461A

## (undated) DEVICE — CATH TRAY FOLEY SURESTEP 16FR W/URINE MTR STATLK LF A303416A

## (undated) DEVICE — ADHESIVE SWIFTSET 0.8ML OCTYL SS6

## (undated) DEVICE — SU VICRYL 4-0 FS-2 27" J422-H

## (undated) DEVICE — PREP CHLORAPREP 26ML TINTED ORANGE  260815

## (undated) DEVICE — GLOVE BIOGEL PI MICRO SZ 6.0 48560

## (undated) DEVICE — STOCKING SLEEVE COMPRESSION CALF MED

## (undated) DEVICE — LINEN BABY BLANKET 5434

## (undated) DEVICE — SUCTION MANIFOLD NEPTUNE 2 SYS 1 PORT 702-025-000

## (undated) DEVICE — ADH SKIN CLOSURE PREMIERPRO EXOFIN 1.0ML 3470

## (undated) RX ORDER — FENTANYL CITRATE-0.9 % NACL/PF 10 MCG/ML
PLASTIC BAG, INJECTION (ML) INTRAVENOUS
Status: DISPENSED
Start: 2023-03-08

## (undated) RX ORDER — OXYTOCIN/0.9 % SODIUM CHLORIDE 30/500 ML
PLASTIC BAG, INJECTION (ML) INTRAVENOUS
Status: DISPENSED
Start: 2018-04-01

## (undated) RX ORDER — OXYTOCIN/0.9 % SODIUM CHLORIDE 30/500 ML
PLASTIC BAG, INJECTION (ML) INTRAVENOUS
Status: DISPENSED
Start: 2023-03-08

## (undated) RX ORDER — MORPHINE SULFATE 1 MG/ML
INJECTION, SOLUTION EPIDURAL; INTRATHECAL; INTRAVENOUS
Status: DISPENSED
Start: 2023-03-08

## (undated) RX ORDER — DEXAMETHASONE SODIUM PHOSPHATE 4 MG/ML
INJECTION, SOLUTION INTRA-ARTICULAR; INTRALESIONAL; INTRAMUSCULAR; INTRAVENOUS; SOFT TISSUE
Status: DISPENSED
Start: 2018-04-01

## (undated) RX ORDER — DEXAMETHASONE SODIUM PHOSPHATE 4 MG/ML
INJECTION, SOLUTION INTRA-ARTICULAR; INTRALESIONAL; INTRAMUSCULAR; INTRAVENOUS; SOFT TISSUE
Status: DISPENSED
Start: 2023-03-08

## (undated) RX ORDER — PHENYLEPHRINE HYDROCHLORIDE 10 MG/ML
INJECTION INTRAVENOUS
Status: DISPENSED
Start: 2023-03-08

## (undated) RX ORDER — KETOROLAC TROMETHAMINE 30 MG/ML
INJECTION, SOLUTION INTRAMUSCULAR; INTRAVENOUS
Status: DISPENSED
Start: 2018-04-01

## (undated) RX ORDER — BUPIVACAINE HYDROCHLORIDE 2.5 MG/ML
INJECTION, SOLUTION EPIDURAL; INFILTRATION; INTRACAUDAL
Status: DISPENSED
Start: 2018-04-01

## (undated) RX ORDER — MORPHINE SULFATE 1 MG/ML
INJECTION, SOLUTION EPIDURAL; INTRATHECAL; INTRAVENOUS
Status: DISPENSED
Start: 2018-04-01

## (undated) RX ORDER — ONDANSETRON 2 MG/ML
INJECTION INTRAMUSCULAR; INTRAVENOUS
Status: DISPENSED
Start: 2018-04-01

## (undated) RX ORDER — FENTANYL CITRATE 50 UG/ML
INJECTION, SOLUTION INTRAMUSCULAR; INTRAVENOUS
Status: DISPENSED
Start: 2018-04-01

## (undated) RX ORDER — KETOROLAC TROMETHAMINE 30 MG/ML
INJECTION, SOLUTION INTRAMUSCULAR; INTRAVENOUS
Status: DISPENSED
Start: 2023-03-08

## (undated) RX ORDER — FENTANYL CITRATE 50 UG/ML
INJECTION, SOLUTION INTRAMUSCULAR; INTRAVENOUS
Status: DISPENSED
Start: 2023-03-08